# Patient Record
Sex: MALE | URBAN - METROPOLITAN AREA
[De-identification: names, ages, dates, MRNs, and addresses within clinical notes are randomized per-mention and may not be internally consistent; named-entity substitution may affect disease eponyms.]

---

## 2018-05-10 ENCOUNTER — EMERGENCY (EMERGENCY)
Facility: HOSPITAL | Age: 63
LOS: 1 days | Discharge: HOME | End: 2018-05-10
Attending: EMERGENCY MEDICINE
Payer: MEDICAID

## 2018-05-10 VITALS
SYSTOLIC BLOOD PRESSURE: 137 MMHG | DIASTOLIC BLOOD PRESSURE: 91 MMHG | TEMPERATURE: 99 F | HEART RATE: 103 BPM | RESPIRATION RATE: 18 BRPM | OXYGEN SATURATION: 96 %

## 2018-05-10 LAB
ANION GAP SERPL CALC-SCNC: 9 MMOL/L — SIGNIFICANT CHANGE UP (ref 5–17)
BASOPHILS # BLD AUTO: 0.1 K/UL — SIGNIFICANT CHANGE UP (ref 0–0.2)
BASOPHILS NFR BLD AUTO: 1.2 % — SIGNIFICANT CHANGE UP (ref 0–2)
BUN SERPL-MCNC: 29 MG/DL — HIGH (ref 7–18)
CALCIUM SERPL-MCNC: 9.3 MG/DL — SIGNIFICANT CHANGE UP (ref 8.4–10.5)
CHLORIDE SERPL-SCNC: 103 MMOL/L — SIGNIFICANT CHANGE UP (ref 96–108)
CO2 SERPL-SCNC: 26 MMOL/L — SIGNIFICANT CHANGE UP (ref 22–31)
CREAT SERPL-MCNC: 1.75 MG/DL — HIGH (ref 0.5–1.3)
EOSINOPHIL # BLD AUTO: 0.2 K/UL — SIGNIFICANT CHANGE UP (ref 0–0.5)
EOSINOPHIL NFR BLD AUTO: 3 % — SIGNIFICANT CHANGE UP (ref 0–6)
GLUCOSE SERPL-MCNC: 97 MG/DL — SIGNIFICANT CHANGE UP (ref 70–99)
HCT VFR BLD CALC: 44.3 % — SIGNIFICANT CHANGE UP (ref 39–50)
HGB BLD-MCNC: 14.9 G/DL — SIGNIFICANT CHANGE UP (ref 13–17)
LYMPHOCYTES # BLD AUTO: 1.8 K/UL — SIGNIFICANT CHANGE UP (ref 1–3.3)
LYMPHOCYTES # BLD AUTO: 30.2 % — SIGNIFICANT CHANGE UP (ref 13–44)
MCHC RBC-ENTMCNC: 31.7 PG — SIGNIFICANT CHANGE UP (ref 27–34)
MCHC RBC-ENTMCNC: 33.6 GM/DL — SIGNIFICANT CHANGE UP (ref 32–36)
MCV RBC AUTO: 94.2 FL — SIGNIFICANT CHANGE UP (ref 80–100)
MONOCYTES # BLD AUTO: 0.6 K/UL — SIGNIFICANT CHANGE UP (ref 0–0.9)
MONOCYTES NFR BLD AUTO: 9.6 % — SIGNIFICANT CHANGE UP (ref 2–14)
NEUTROPHILS # BLD AUTO: 3.4 K/UL — SIGNIFICANT CHANGE UP (ref 1.8–7.4)
NEUTROPHILS NFR BLD AUTO: 56 % — SIGNIFICANT CHANGE UP (ref 43–77)
PLATELET # BLD AUTO: 163 K/UL — SIGNIFICANT CHANGE UP (ref 150–400)
POTASSIUM SERPL-MCNC: 4.1 MMOL/L — SIGNIFICANT CHANGE UP (ref 3.5–5.3)
POTASSIUM SERPL-SCNC: 4.1 MMOL/L — SIGNIFICANT CHANGE UP (ref 3.5–5.3)
RBC # BLD: 4.7 M/UL — SIGNIFICANT CHANGE UP (ref 4.2–5.8)
RBC # FLD: 11.7 % — SIGNIFICANT CHANGE UP (ref 10.3–14.5)
SODIUM SERPL-SCNC: 138 MMOL/L — SIGNIFICANT CHANGE UP (ref 135–145)
TROPONIN I SERPL-MCNC: <0.015 NG/ML — SIGNIFICANT CHANGE UP (ref 0–0.04)
WBC # BLD: 6.1 K/UL — SIGNIFICANT CHANGE UP (ref 3.8–10.5)
WBC # FLD AUTO: 6.1 K/UL — SIGNIFICANT CHANGE UP (ref 3.8–10.5)

## 2018-05-10 PROCEDURE — 93010 ELECTROCARDIOGRAM REPORT: CPT

## 2018-05-10 PROCEDURE — 93005 ELECTROCARDIOGRAM TRACING: CPT

## 2018-05-10 PROCEDURE — 99284 EMERGENCY DEPT VISIT MOD MDM: CPT

## 2018-05-10 PROCEDURE — 80048 BASIC METABOLIC PNL TOTAL CA: CPT

## 2018-05-10 PROCEDURE — 70450 CT HEAD/BRAIN W/O DYE: CPT | Mod: 26

## 2018-05-10 PROCEDURE — 70450 CT HEAD/BRAIN W/O DYE: CPT

## 2018-05-10 PROCEDURE — 82962 GLUCOSE BLOOD TEST: CPT

## 2018-05-10 PROCEDURE — 99284 EMERGENCY DEPT VISIT MOD MDM: CPT | Mod: 25

## 2018-05-10 PROCEDURE — 96374 THER/PROPH/DIAG INJ IV PUSH: CPT

## 2018-05-10 PROCEDURE — 84484 ASSAY OF TROPONIN QUANT: CPT

## 2018-05-10 PROCEDURE — 96375 TX/PRO/DX INJ NEW DRUG ADDON: CPT

## 2018-05-10 PROCEDURE — 85027 COMPLETE CBC AUTOMATED: CPT

## 2018-05-10 RX ORDER — KETOROLAC TROMETHAMINE 30 MG/ML
30 SYRINGE (ML) INJECTION ONCE
Refills: 0 | Status: DISCONTINUED | OUTPATIENT
Start: 2018-05-10 | End: 2018-05-10

## 2018-05-10 RX ORDER — SODIUM CHLORIDE 9 MG/ML
1000 INJECTION INTRAMUSCULAR; INTRAVENOUS; SUBCUTANEOUS ONCE
Refills: 0 | Status: COMPLETED | OUTPATIENT
Start: 2018-05-10 | End: 2018-05-10

## 2018-05-10 RX ORDER — DIPHENHYDRAMINE HCL 50 MG
25 CAPSULE ORAL ONCE
Refills: 0 | Status: COMPLETED | OUTPATIENT
Start: 2018-05-10 | End: 2018-05-10

## 2018-05-10 RX ADMIN — Medication 25 MILLIGRAM(S): at 17:41

## 2018-05-10 RX ADMIN — Medication 30 MILLIGRAM(S): at 18:06

## 2018-05-10 RX ADMIN — Medication 30 MILLIGRAM(S): at 17:42

## 2018-05-10 RX ADMIN — SODIUM CHLORIDE 1000 MILLILITER(S): 9 INJECTION INTRAMUSCULAR; INTRAVENOUS; SUBCUTANEOUS at 17:42

## 2018-05-10 NOTE — ED ADULT NURSE NOTE - OBJECTIVE STATEMENT
Pt speaks [place], prefers his cousin to translate, c/o generalized weakness, pain to B/L LE, denies trauma  Denies, dizziness, chest pain or difficulty breathing  feels like this x 15 days

## 2018-05-10 NOTE — ED PROVIDER NOTE - OBJECTIVE STATEMENT
61 y/o w/ hx of HTN, CVA, anxiety disorder presents c/o weakness x 10 days. Feels weak in lower extremities bilaterally. Pt also reports not sleeping secondary to anxiety. Denies fever, HA, nausea, emesis, diarrhea, blurry vision, dizziness, and any other complaints. NKDA.

## 2018-05-10 NOTE — ED PROVIDER NOTE - PROGRESS NOTE DETAILS
pt reassessed, labs and imaging negative. Pt asking for medication for sleep.  Pt does appear anxious.  Pt says he does have a psychiatrist to follow up with, but that he has not been helping.  Admission offered for weakness as pt feels unsteady when walking however pt prefers to go home.  Will give outpatient referral.

## 2018-05-10 NOTE — ED PROVIDER NOTE - CONSTITUTIONAL, MLM
Generalized malaise, well nourished, awake, alert, oriented to person, place, time/situation and in no apparent distress. normal...

## 2020-01-31 ENCOUNTER — INPATIENT (INPATIENT)
Facility: HOSPITAL | Age: 65
LOS: 17 days | Discharge: NURSING, REHAB OR LONG-TERM CARE | DRG: 64 | End: 2020-02-18
Attending: PSYCHIATRY & NEUROLOGY | Admitting: PSYCHIATRY & NEUROLOGY
Payer: MEDICAID

## 2020-01-31 VITALS — SYSTOLIC BLOOD PRESSURE: 208 MMHG | RESPIRATION RATE: 14 BRPM | DIASTOLIC BLOOD PRESSURE: 123 MMHG | HEART RATE: 90 BPM

## 2020-01-31 DIAGNOSIS — R56.9 UNSPECIFIED CONVULSIONS: ICD-10-CM

## 2020-01-31 LAB
ALBUMIN SERPL ELPH-MCNC: 3.5 G/DL — SIGNIFICANT CHANGE UP (ref 3.3–5)
ALP SERPL-CCNC: 60 U/L — SIGNIFICANT CHANGE UP (ref 40–120)
ALT FLD-CCNC: 11 U/L — SIGNIFICANT CHANGE UP (ref 10–45)
ANION GAP SERPL CALC-SCNC: 12 MMOL/L — SIGNIFICANT CHANGE UP (ref 5–17)
AST SERPL-CCNC: 19 U/L — SIGNIFICANT CHANGE UP (ref 10–40)
BASOPHILS # BLD AUTO: 0.02 K/UL — SIGNIFICANT CHANGE UP (ref 0–0.2)
BASOPHILS NFR BLD AUTO: 0.3 % — SIGNIFICANT CHANGE UP (ref 0–2)
BILIRUB SERPL-MCNC: 0.7 MG/DL — SIGNIFICANT CHANGE UP (ref 0.2–1.2)
BUN SERPL-MCNC: 10 MG/DL — SIGNIFICANT CHANGE UP (ref 7–23)
CALCIUM SERPL-MCNC: 8.1 MG/DL — LOW (ref 8.4–10.5)
CHLORIDE SERPL-SCNC: 111 MMOL/L — HIGH (ref 96–108)
CO2 SERPL-SCNC: 19 MMOL/L — LOW (ref 22–31)
CREAT SERPL-MCNC: 1.21 MG/DL — SIGNIFICANT CHANGE UP (ref 0.5–1.3)
EOSINOPHIL # BLD AUTO: 0.09 K/UL — SIGNIFICANT CHANGE UP (ref 0–0.5)
EOSINOPHIL NFR BLD AUTO: 1.3 % — SIGNIFICANT CHANGE UP (ref 0–6)
GLUCOSE BLDC GLUCOMTR-MCNC: 100 MG/DL — HIGH (ref 70–99)
GLUCOSE SERPL-MCNC: 104 MG/DL — HIGH (ref 70–99)
HCT VFR BLD CALC: 35.8 % — LOW (ref 39–50)
HGB BLD-MCNC: 13 G/DL — SIGNIFICANT CHANGE UP (ref 13–17)
IMM GRANULOCYTES NFR BLD AUTO: 0.4 % — SIGNIFICANT CHANGE UP (ref 0–1.5)
LYMPHOCYTES # BLD AUTO: 0.57 K/UL — LOW (ref 1–3.3)
LYMPHOCYTES # BLD AUTO: 8.3 % — LOW (ref 13–44)
MCHC RBC-ENTMCNC: 35.5 PG — HIGH (ref 27–34)
MCHC RBC-ENTMCNC: 36.3 GM/DL — HIGH (ref 32–36)
MCV RBC AUTO: 97.8 FL — SIGNIFICANT CHANGE UP (ref 80–100)
MONOCYTES # BLD AUTO: 0.53 K/UL — SIGNIFICANT CHANGE UP (ref 0–0.9)
MONOCYTES NFR BLD AUTO: 7.7 % — SIGNIFICANT CHANGE UP (ref 2–14)
NEUTROPHILS # BLD AUTO: 5.64 K/UL — SIGNIFICANT CHANGE UP (ref 1.8–7.4)
NEUTROPHILS NFR BLD AUTO: 82 % — HIGH (ref 43–77)
NRBC # BLD: 0 /100 WBCS — SIGNIFICANT CHANGE UP (ref 0–0)
PLATELET # BLD AUTO: 144 K/UL — LOW (ref 150–400)
POTASSIUM SERPL-MCNC: 3.9 MMOL/L — SIGNIFICANT CHANGE UP (ref 3.5–5.3)
POTASSIUM SERPL-SCNC: 3.9 MMOL/L — SIGNIFICANT CHANGE UP (ref 3.5–5.3)
PROT SERPL-MCNC: 5.5 G/DL — LOW (ref 6–8.3)
RBC # BLD: 3.66 M/UL — LOW (ref 4.2–5.8)
RBC # FLD: 13.6 % — SIGNIFICANT CHANGE UP (ref 10.3–14.5)
SODIUM SERPL-SCNC: 142 MMOL/L — SIGNIFICANT CHANGE UP (ref 135–145)
WBC # BLD: 6.88 K/UL — SIGNIFICANT CHANGE UP (ref 3.8–10.5)
WBC # FLD AUTO: 6.88 K/UL — SIGNIFICANT CHANGE UP (ref 3.8–10.5)

## 2020-01-31 PROCEDURE — 61645 PERQ ART M-THROMBECT &/NFS: CPT | Mod: RT

## 2020-01-31 PROCEDURE — 70498 CT ANGIOGRAPHY NECK: CPT | Mod: 26

## 2020-01-31 PROCEDURE — 71045 X-RAY EXAM CHEST 1 VIEW: CPT | Mod: 26

## 2020-01-31 PROCEDURE — 99291 CRITICAL CARE FIRST HOUR: CPT

## 2020-01-31 PROCEDURE — 70450 CT HEAD/BRAIN W/O DYE: CPT | Mod: 26,59,77

## 2020-01-31 PROCEDURE — 0042T: CPT

## 2020-01-31 PROCEDURE — 70496 CT ANGIOGRAPHY HEAD: CPT | Mod: 26

## 2020-01-31 PROCEDURE — 70450 CT HEAD/BRAIN W/O DYE: CPT | Mod: 26,59

## 2020-01-31 PROCEDURE — 99223 1ST HOSP IP/OBS HIGH 75: CPT

## 2020-01-31 RX ORDER — LABETALOL HCL 100 MG
10 TABLET ORAL ONCE
Refills: 0 | Status: COMPLETED | OUTPATIENT
Start: 2020-01-31 | End: 2020-01-31

## 2020-01-31 RX ORDER — ASPIRIN/CALCIUM CARB/MAGNESIUM 324 MG
81 TABLET ORAL DAILY
Refills: 0 | Status: DISCONTINUED | OUTPATIENT
Start: 2020-01-31 | End: 2020-02-03

## 2020-01-31 RX ORDER — PROPOFOL 10 MG/ML
4 INJECTION, EMULSION INTRAVENOUS
Qty: 1000 | Refills: 0 | Status: DISCONTINUED | OUTPATIENT
Start: 2020-01-31 | End: 2020-02-01

## 2020-01-31 RX ORDER — CLOPIDOGREL BISULFATE 75 MG/1
75 TABLET, FILM COATED ORAL DAILY
Refills: 0 | Status: DISCONTINUED | OUTPATIENT
Start: 2020-01-31 | End: 2020-02-10

## 2020-01-31 RX ORDER — LEVETIRACETAM 250 MG/1
500 TABLET, FILM COATED ORAL EVERY 12 HOURS
Refills: 0 | Status: DISCONTINUED | OUTPATIENT
Start: 2020-01-31 | End: 2020-02-05

## 2020-01-31 RX ORDER — LEVETIRACETAM 250 MG/1
1000 TABLET, FILM COATED ORAL ONCE
Refills: 0 | Status: COMPLETED | OUTPATIENT
Start: 2020-01-31 | End: 2020-01-31

## 2020-01-31 RX ORDER — NOREPINEPHRINE BITARTRATE/D5W 8 MG/250ML
0.07 PLASTIC BAG, INJECTION (ML) INTRAVENOUS
Qty: 8 | Refills: 0 | Status: DISCONTINUED | OUTPATIENT
Start: 2020-01-31 | End: 2020-02-01

## 2020-01-31 RX ORDER — SODIUM CHLORIDE 9 MG/ML
1000 INJECTION INTRAMUSCULAR; INTRAVENOUS; SUBCUTANEOUS
Refills: 0 | Status: DISCONTINUED | OUTPATIENT
Start: 2020-01-31 | End: 2020-02-03

## 2020-01-31 RX ORDER — ATORVASTATIN CALCIUM 80 MG/1
80 TABLET, FILM COATED ORAL AT BEDTIME
Refills: 0 | Status: DISCONTINUED | OUTPATIENT
Start: 2020-01-31 | End: 2020-02-05

## 2020-01-31 RX ORDER — CHLORHEXIDINE GLUCONATE 213 G/1000ML
1 SOLUTION TOPICAL
Refills: 0 | Status: DISCONTINUED | OUTPATIENT
Start: 2020-01-31 | End: 2020-02-07

## 2020-01-31 RX ORDER — DEXMEDETOMIDINE HYDROCHLORIDE IN 0.9% SODIUM CHLORIDE 4 UG/ML
0.19 INJECTION INTRAVENOUS
Qty: 200 | Refills: 0 | Status: DISCONTINUED | OUTPATIENT
Start: 2020-01-31 | End: 2020-02-03

## 2020-01-31 RX ORDER — EPINEPHRINE 0.3 MG/.3ML
4 INJECTION INTRAMUSCULAR; SUBCUTANEOUS
Qty: 4 | Refills: 0 | Status: DISCONTINUED | OUTPATIENT
Start: 2020-01-31 | End: 2020-01-31

## 2020-01-31 RX ORDER — SODIUM CHLORIDE 9 MG/ML
1000 INJECTION INTRAMUSCULAR; INTRAVENOUS; SUBCUTANEOUS ONCE
Refills: 0 | Status: COMPLETED | OUTPATIENT
Start: 2020-01-31 | End: 2020-01-31

## 2020-01-31 RX ADMIN — DEXMEDETOMIDINE HYDROCHLORIDE IN 0.9% SODIUM CHLORIDE 4 MICROGRAM(S)/KG/HR: 4 INJECTION INTRAVENOUS at 17:25

## 2020-01-31 RX ADMIN — LEVETIRACETAM 400 MILLIGRAM(S): 250 TABLET, FILM COATED ORAL at 19:57

## 2020-01-31 RX ADMIN — Medication 10.19 MICROGRAM(S)/KG/MIN: at 17:45

## 2020-01-31 RX ADMIN — Medication 2 MILLIGRAM(S): at 21:27

## 2020-01-31 RX ADMIN — PROPOFOL 1.98 MICROGRAM(S)/KG/MIN: 10 INJECTION, EMULSION INTRAVENOUS at 21:27

## 2020-01-31 RX ADMIN — Medication 10 MILLIGRAM(S): at 14:15

## 2020-01-31 RX ADMIN — Medication 10 MILLIGRAM(S): at 14:10

## 2020-01-31 RX ADMIN — SODIUM CHLORIDE 2000 MILLILITER(S): 9 INJECTION INTRAMUSCULAR; INTRAVENOUS; SUBCUTANEOUS at 18:35

## 2020-01-31 RX ADMIN — SODIUM CHLORIDE 75 MILLILITER(S): 9 INJECTION INTRAMUSCULAR; INTRAVENOUS; SUBCUTANEOUS at 19:30

## 2020-01-31 RX ADMIN — Medication 2 MILLIGRAM(S): at 22:35

## 2020-01-31 NOTE — CONSULT NOTE ADULT - ASSESSMENT
ASSESSMENT/PLAN: Patient with new R MCA stroke, is now outside of tPA window, still within window for thrombectomy  Discussed with neurosurgery, patient will go for R MCA thrombectomy    Localization: R MCA  Last known normal: 830am 1/31/20  tPA: Not Given  Continue close monitoring for neurologic deterioration  Permissive HTN: Goal - sBP 140-180  Titrate statin to LDL goal less than 70  Antiplatelet: Hold for now  MR Head: Pending  Physical therapy/OT/Speech eval/treatment.   LOC: Pending

## 2020-01-31 NOTE — BRIEF OPERATIVE NOTE - NSICDXBRIEFPROCEDURE_GEN_ALL_CORE_FT
PROCEDURES:  Angiogram, carotid and cerebral, right 31-Jan-2020 15:41:15 Attempted mechanical thrombectomy of right MCA thrombus Marvel Hilton

## 2020-01-31 NOTE — H&P ADULT - NSHPLABSRESULTS_GEN_ALL_CORE
.  LABS:                         RADIOLOGY, EKG & ADDITIONAL TESTS: Reviewed. .  LABS:                         13.0   6.88  )-----------( 144      ( 31 Jan 2020 19:35 )             35.8     01-31    142  |  111<H>  |  10  ----------------------------<  104<H>  3.9   |  19<L>  |  1.21    Ca    8.1<L>      31 Jan 2020 19:34    TPro  5.5<L>  /  Alb  3.5  /  TBili  0.7  /  DBili  x   /  AST  19  /  ALT  11  /  AlkPhos  60  01-31                  RADIOLOGY, EKG & ADDITIONAL TESTS: Reviewed.

## 2020-01-31 NOTE — H&P ADULT - HISTORY OF PRESENT ILLNESS
65 y/o male transferred from [facility] with co History obtained from [place] medical record.     65 y/o male transferred from [facility] with concern for stroke (MCA). Patient presented to [facility] after episode of falling on floor and speech slurring in pharmacy, found to have left sided facial drooping of eyelids and slurred speech on presentation to [place]. Per reports, patient AAOx3 and able to follow commands.   Patient outside of window for tPA, transferred to [facility] for possible thrombectomy. At [facility], stroke code called on admission - imaging negative for acute infarct. Thrombectomy of R MCA attempted, however aborted as noted to have stump occlusion of mid right M1 with extensive JOSEPHINE collaterals - likely chronic occulusions. History obtained from [place] medical record.     65 y/o male transferred from [facility] with concern for stroke (MCA). Patient presented to [facility] after episode of falling on floor and speech slurring in pharmacy, found to have left sided facial drooping of eyelids and slurred speech on presentation to [place]. Per reports, patient AAOx3 and able to follow commands.   Patient outside of window for tPA, transferred to [facility] for possible thrombectomy. At [facility], stroke code called on admission - imaging negative for acute infarct. Thrombectomy of R MCA attempted, however aborted as noted to have stump occlusion of mid right M1 with extensive JOSEPHINE collaterals - likely chronic occulusions. Patient extubated and upon presentation to MICU, patient noted to be altered with tongue rolling back, with contraction of left upper and lower extremity - patient intubated for airway protection. Second stroke code called, with repeat imaging without evidence of acute infarct. History obtained from [place] medical record.     63 y/o male transferred from [facility] with concern for stroke (MCA). Patient presented to [facility] after episode of falling on floor and speech slurring in pharmacy, found to have left sided facial drooping of eyelids and slurred speech on presentation to [place]. Per reports, patient AAOx3 and able to follow commands.   Patient outside of window for tPA, transferred to [facility] for possible thrombectomy. At [facility], stroke code called on admission - imaging negative for acute infarct. Thrombectomy of R MCA attempted, however aborted as noted to have stump occlusion of mid right M1 with extensive JOSEPHINE collaterals - likely chronic occulusions. Patient extubated and upon presentation to MICU, patient noted to be altered with tongue rolling back, with contraction of left upper and lower extremity - patient intubated for airway protection. Second stroke code called, with repeat imaging without evidence of acute infarct.      [place]: Vitals:  Labs:  Interventions:    [facility]: Vitals  Labs:  [place]: History obtained from [place] medical record.     63 y/o male with PMH hypertension CVA (2 months ago at [facility]), dementia and depression transferred from [facility] with concern for stroke (MCA). Patient presented to [facility] after presenting to pharmacy with shirt and shoes on backwards and episode of falling on floor and speech slurring in pharmacy, found to have left sided facial drooping of eyelids, slurred speech, and expressive aphasia on presentation to [place]. Last known normal unknown. Baseline mental status AAOx3 able to follow commands, completes ADLs independently.     Patient outside of window for tPA, transferred to [facility] for possible thrombectomy. At [facility], stroke code called on admission - imaging negative for acute infarct. Thrombectomy of R MCA attempted, however aborted as noted to have stump occlusion of mid right M1 with extensive JOSEPHINE collaterals - likely chronic occulusion. Patient extubated and upon presentation to MICU, patient noted to be altered with tongue rolling back, with contraction of left upper and lower extremity - patient intubated for airway protection. Second stroke code called, with repeat imaging without evidence of acute infarct.      [place]: Vitals: T: Afebrile | HR: 91-93 | BP: 147-159/ (MAP: ) | RR: 20-22 |   Labs: BMP: WNL | UA: WNL | BAL <10 | Lipid panel: ; Cholesterol 176 |   Interventions: CTH: No acute intracranial abnormality; chronic microvascular ischemic and volume changes - old posterior right frontal cortical infarct noted.   CT Perfusion Brain w/ contrast: Right MCA abnormal flow pattern consistent with ischemic change; no discrete evidence of infarct.     [facility]: Vitals: T:   Labs:  Interventions History obtained from [place] medical record.     63 y/o male with PMH hypertension CVA (2 months ago at [facility]), dementia and depression transferred from [facility] with concern for stroke (MCA). Patient presented to [facility] after presenting to pharmacy with shirt and shoes on backwards and episode of falling on floor and speech slurring in pharmacy, found to have left sided facial drooping of eyelids, slurred speech, and expressive aphasia on presentation to [place]. Last known normal unknown. Baseline mental status AAOx3 able to follow commands, completes ADLs independently.     Patient outside of window for tPA, transferred to [facility] for possible thrombectomy. At [facility], stroke code called on admission - imaging negative for acute infarct. Thrombectomy of R MCA attempted, however aborted as noted to have stump occlusion of mid right M1 with extensive JOSEPHINE collaterals - likely chronic occulusion. Patient extubated and upon presentation to MICU, patient noted to be altered with tongue rolling back, with contraction of left upper and lower extremity - patient intubated for airway protection. Second stroke code called, with repeat imaging without evidence of acute infarct. Patients admitted to MICU, intubated pending vEEG.      [place]: Vitals: T: Afebrile | HR: 91-93 | BP: 147-159/ (MAP: ) | RR: 20-22 |   Labs: BMP: WNL | UA: WNL | BAL <10 | Lipid panel: ; Cholesterol 176 |   Interventions: CTH: No acute intracranial abnormality; chronic microvascular ischemic and volume changes - old posterior right frontal cortical infarct noted.   CT Perfusion Brain w/ contrast: Right MCA abnormal flow pattern consistent with ischemic change; no discrete evidence of infarct.     [facility]: Vitals: T: Afebrile | HR: 90 | BP: 208/123 | RR: 14  Labs:   CT Brain Stroke (on arrival): Gyriform sites of cortical hyperdensity, presumed contrast enhancement, are favored to be subacute sites of infarction, superimposed on more chronic sites of right MCA infarction.  CT Brain Stroke (2nd stroke code, following aborted thrombectomy): Compared to prior study from earlier in the day, no significant change in scattered enhancement of the right frontal and posterior temporal lobes, likely secondary to subacute infarction  CT Perfusion: Abnormal perfusion with RAPID calculated mismatch volume of 44 ml and mismatch ratio is infinite within the right MCA territory.  CT Angio Head: Probable high-grade stenosis of the right M1 segment rather than occlusion. Multifocal areas of narrowing involving the right A1 and left M1 segment which may be secondary to intracranial atherosclerosis.  CT Angio Neck: Normal CTA neck.     Interventions: Intubated and sedated s/p thrombectomy, vEEG placed History obtained from [place] medical record, as patient altered and subsequently intubated.     63 y/o male with PMH hypertension CVA (2 months ago at [facility]), dementia and depression transferred from [facility] with concern for stroke (MCA). Patient presented to [facility] after presenting to pharmacy with shirt and shoes on backwards and episode of falling on floor and speech slurring in pharmacy, found to have left sided facial drooping of eyelids, slurred speech, and expressive aphasia on presentation to [place]. Last known normal unknown. Baseline mental status AAOx3 able to follow commands, completes ADLs independently.     Patient outside of window for tPA, transferred to [facility] for possible thrombectomy. At [facility], stroke code called on admission - imaging negative for acute infarct. Thrombectomy of R MCA attempted, however aborted as noted to have stump occlusion of mid right M1 with extensive JOSEPHINE collaterals - likely chronic occulusion. Patient extubated and upon presentation to MICU, patient noted to be altered with tongue rolling back, with contraction of left upper and lower extremity - patient intubated for airway protection. Second stroke code called, with repeat imaging without evidence of acute infarct. Patients admitted to MICU, intubated pending vEEG.      [place]: Vitals: T: Afebrile | HR: 91-93 | BP: 147-159/ (MAP: ) | RR: 20-22 |   Labs: BMP: WNL | UA: WNL | BAL <10 | Lipid panel: ; Cholesterol 176 |   Interventions: CTH: No acute intracranial abnormality; chronic microvascular ischemic and volume changes - old posterior right frontal cortical infarct noted.   CT Perfusion Brain w/ contrast: Right MCA abnormal flow pattern consistent with ischemic change; no discrete evidence of infarct.     [facility]: Vitals: T: Afebrile | HR: 90 | BP: 208/123 | RR: 14  Labs: WBC 6.88; Hgb 13; Plt 144; Cr 1.21; Glucose 104  CT Brain Stroke (on arrival): Gyriform sites of cortical hyperdensity, presumed contrast enhancement, are favored to be subacute sites of infarction, superimposed on more chronic sites of right MCA infarction.  CT Brain Stroke (2nd stroke code, following aborted thrombectomy): Compared to prior study from earlier in the day, no significant change in scattered enhancement of the right frontal and posterior temporal lobes, likely secondary to subacute infarction  CT Perfusion: Abnormal perfusion with RAPID calculated mismatch volume of 44 ml and mismatch ratio is infinite within the right MCA territory.  CT Angio Head: Probable high-grade stenosis of the right M1 segment rather than occlusion. Multifocal areas of narrowing involving the right A1 and left M1 segment which may be secondary to intracranial atherosclerosis.  CT Angio Neck: Normal CTA neck.     Interventions: Intubated and sedated s/p thrombectomy, vEEG placed

## 2020-01-31 NOTE — H&P ADULT - NSHPPHYSICALEXAM_GEN_ALL_CORE
.  VITAL SIGNS:  T(C): 36.9 (01-31-20 @ 16:40), Max: 36.9 (01-31-20 @ 16:40)  T(F): 98.5 (01-31-20 @ 16:40), Max: 98.5 (01-31-20 @ 16:40)  HR: 90 (01-31-20 @ 18:33) (68 - 121)  BP: 137/65 (01-31-20 @ 16:50) (137/65 - 211/129)  BP(mean): 93 (01-31-20 @ 16:50) (93 - 98)  RR: 19 (01-31-20 @ 17:50) (14 - 42)  SpO2: 100% (01-31-20 @ 18:33) (96% - 100%)  Wt(kg): --    PHYSICAL EXAM:    Constitutional: WDWN resting comfortably in bed; NAD  Head: NC/AT  Eyes: PERRL, EOMI, clear conjunctiva  ENT: no nasal discharge; uvula midline, no oropharyngeal erythema or exudates; MMM  Neck: supple; no JVD or thyromegaly  Respiratory: CTA B/L; no W/R/R, no retractions  Cardiac: +S1/S2; RRR; no M/R/G; PMI non-displaced  Gastrointestinal: soft, NT/ND; no rebound or guarding; +BSx4  Genitourinary: normal external genitalia  Back: spine midline, no bony tenderness or step-offs; no CVAT B/L  Extremities: WWP, no clubbing or cyanosis; no peripheral edema  Musculoskeletal: NROM x4; no joint swelling, tenderness or erythema  Vascular: 2+ radial, femoral, DP/PT pulses B/L  Dermatologic: skin warm, dry and intact; no rashes, wounds, or scars  Lymphatic: no submandibular or cervical LAD  Neurologic: AAOx3; CNII-XII grossly intact; no focal deficits  Psychiatric: affect and characteristics of appearance, verbalizations, behaviors are appropriate .  VITAL SIGNS:  T(C): 36.9 (01-31-20 @ 16:40), Max: 36.9 (01-31-20 @ 16:40)  T(F): 98.5 (01-31-20 @ 16:40), Max: 98.5 (01-31-20 @ 16:40)  HR: 90 (01-31-20 @ 18:33) (68 - 121)  BP: 137/65 (01-31-20 @ 16:50) (137/65 - 211/129)  BP(mean): 93 (01-31-20 @ 16:50) (93 - 98)  RR: 19 (01-31-20 @ 17:50) (14 - 42)  SpO2: 100% (01-31-20 @ 18:33) (96% - 100%)  Wt(kg): --    PHYSICAL EXAM:    Constitutional:  male lying in bed, snoring, somnolent  Head: NC/AT; left facial droop   Eyes: PERRL, EOMI, clear conjunctiva  ENT: no nasal discharge; uvula midline, no oropharyngeal erythema or exudates; MMM  Neck: supple; no JVD or thyromegaly  Respiratory: CTA B/L; no W/R/R, no retractions  Cardiac: +S1/S2; RRR; no M/R/G; PMI non-displaced  Gastrointestinal: soft, NT/ND; no rebound or guarding; +BSx4  Genitourinary: normal external genitalia  Extremities: WWP, no clubbing or cyanosis; no peripheral edema  Musculoskeletal: Right side strength within normal limits, left side arm in constant flexed position   Vascular: 2+ radial, femoral, DP/PT pulses B/L  Dermatologic: skin warm, dry and intact; no rashes, wounds, or scars  Lymphatic: no submandibular or cervical LAD  Neurologic: unable to assess orientation, patient somnolent

## 2020-01-31 NOTE — CONSULT NOTE ADULT - SUBJECTIVE AND OBJECTIVE BOX
NEURO CONSULT NOTE  **INCOMPLETE**    Patient is a 65 y/o male with PMHx of    last known well at 830am. At [place] patient was noted to have dysarthria, altered mental status, BP on arrival to [place] was _____. Patient was found to have acute R MCA stroke with perfusion defect on CT scan at [place], transferred to [facility] for further management. On arrival to [facility] stroke code called with NIHSS of ____, positive for dysarthria and only answered one question correctly, however following all commands, moving all extremities spontaneously, and non aphasic. BP at this time was 215/106, FSG was 100. CTH/CTA/CTP on arrival showed R sided perfusion defect and is consistent with subacute R MCA infarct. NEURO CONSULT NOTE  **INCOMPLETE**    Patient is a 63 y/o male with PMHx of    last known well at 830am. At [place] patient was noted to have dysarthria, altered mental status, BP on arrival to [place] was _____. Patient was found to have acute R MCA stroke with perfusion defect on CT scan at [place], transferred to [facility] for further management. On arrival to [facility] stroke code called with NIHSS of ____, positive for dysarthria and only answered one question correctly, however following all commands, moving all extremities spontaneously, and non aphasic. BP at this time was 215/106, FSG was 100. CTH/CTA/CTP on arrival showed R sided perfusion defect and is consistent with subacute R MCA infarct.     SUBJECTIVE / INTERVAL HPI: Patient seen and examined at bedside.     VITAL SIGNS:  Vital Signs Last 24 Hrs  T(C): --  T(F): --  HR: 90 (31 Jan 2020 14:03) (90 - 90)  BP: 208/123 (31 Jan 2020 14:03) (208/123 - 208/123)  BP(mean): --  RR: 14 (31 Jan 2020 14:03) (14 - 14)  SpO2: --    REVIEW OF SYSTEMS:    CONSTITUTIONAL: No weakness, fevers or chills.   EYES/ENT: No visual changes;  No vertigo or throat pain   NECK: No pain or stiffness  RESPIRATORY: No cough, wheezing, hemoptysis; No shortness of breath  CARDIOVASCULAR: No chest pain or palpitations  GASTROINTESTINAL: No abdominal or epigastric pain. No nausea, vomiting, or hematemesis; No diarrhea or constipation. No melena or hematochezia.  GENITOURINARY: No dysuria, frequency or hematuria  NEUROLOGICAL: No numbness or weakness  SKIN: No itching, burning, rashes, or lesions   All other review of systems is negative unless indicated above.    PHYSICAL EXAM:  General: Well developed well nourished appearing male lying in bed, slightly confused, following commands and responding appropriately.   Neurologic:  - Mental Status:  AAOx2 (not to date);  - Verbal:  Speech is dysarthric but understandable. Patient is able to identify three items correctly. Negative for expressive or receptive aphasia.   - Cranial Nerves II-XII:    II:  PERRLA; visual fields are full to confrontation  III, IV, VI:  EOMI, no nystagmus  V:  facial sensation is intact in the V1-V3 distribution bilaterally.  VII:  face is symmetric  VIII:  hearing is intact to finger rub  IX, X:  uvula is midline and soft palate rises symmetrically  XI:  head turning and shoulder shrug are intact bilaterally  XII:  tongue protrudes in the midline  - Motor:  Patient initially only moving LUE and LLE spontaneously, however when stimulated patient is able to move all extremities with 5/5 strength. Patient appears to respond to stimuli on both sides with negative extinction. Pronator drift is negative bilaterally for upper and lower extremities.   - Reflexes:  2+ and symmetric at the biceps and patellars  - Sensory:  intact to light touch on bilateral upper and lower extremities.  - Coordination:  no dysmetria  - Gait:  Deferred    MEDICATIONS:  MEDICATIONS  (STANDING):    MEDICATIONS  (PRN):      ALLERGIES:  Allergies    Allergy Status Unknown    Intolerances        LABS:              CAPILLARY BLOOD GLUCOSE      POCT Blood Glucose.: 100 mg/dL (31 Jan 2020 14:09)      RADIOLOGY & ADDITIONAL TESTS: Reviewed. NEURO CONSULT NOTE  **INCOMPLETE**    Patient is a 63 y/o male with PMHx of    last known well at 830am. At [place] patient was noted to have dysarthria, altered mental status, BP on arrival to [place] was _____. Patient was found to have acute R MCA stroke with perfusion defect on CT scan at [place], transferred to [facility] for further management. On arrival to [facility] stroke code called with NIHSS of 5, positive for dysarthria, facial droop, and slight L pronator drift. Patient only answered one question correctly, however following all commands, moving all extremities spontaneously, and non aphasic. BP at this time was 215/106, FSG was 100. CTH/CTA/CTP on arrival showed R sided perfusion defect and is consistent with subacute R MCA infarct.     SUBJECTIVE / INTERVAL HPI: Patient seen and examined at bedside.     VITAL SIGNS:  Vital Signs Last 24 Hrs  T(C): --  T(F): --  HR: 90 (31 Jan 2020 14:03) (90 - 90)  BP: 208/123 (31 Jan 2020 14:03) (208/123 - 208/123)  BP(mean): --  RR: 14 (31 Jan 2020 14:03) (14 - 14)  SpO2: --    REVIEW OF SYSTEMS:    CONSTITUTIONAL: No weakness, fevers or chills.   EYES/ENT: No visual changes;  No vertigo or throat pain   NECK: No pain or stiffness  RESPIRATORY: No cough, wheezing, hemoptysis; No shortness of breath  CARDIOVASCULAR: No chest pain or palpitations  GASTROINTESTINAL: No abdominal or epigastric pain. No nausea, vomiting, or hematemesis; No diarrhea or constipation. No melena or hematochezia.  GENITOURINARY: No dysuria, frequency or hematuria  NEUROLOGICAL: No numbness or weakness  SKIN: No itching, burning, rashes, or lesions   All other review of systems is negative unless indicated above.    PHYSICAL EXAM:  General: Well developed well nourished appearing male lying in bed, slightly confused, following commands and responding appropriately.   Neurologic:  - Mental Status:  AAOx2 (not to date);  - Verbal:  Speech is dysarthric but understandable. Patient is able to identify three items correctly. Negative for expressive or receptive aphasia.   - Cranial Nerves II-XII:    II:  PERRLA; visual fields are full to confrontation  III, IV, VI:  EOMI, no nystagmus  V:  facial sensation is intact in the V1-V3 distribution bilaterally.  VII: Slight L sided facial droop.   VIII:  hearing is intact to finger rub  IX, X:  uvula is midline and soft palate rises symmetrically  XI:  head turning and shoulder shrug are intact bilaterally  XII:  tongue protrudes in the midline  - Motor:  Patient initially only moving LUE and LLE spontaneously, however when stimulated patient is able to move all extremities with 5/5 strength. Patient appears to respond to stimuli on both sides with negative extinction. Pronator drift is slightly positive for LUE. Negative bilaterally for lower extremities.   - Reflexes:  2+ and symmetric at the biceps and patellars  - Sensory:  intact to light touch on bilateral upper and lower extremities.  - Coordination:  no dysmetria  - Gait:  Deferred    MEDICATIONS:  MEDICATIONS  (STANDING):    MEDICATIONS  (PRN):      ALLERGIES:  Allergies    Allergy Status Unknown    Intolerances        LABS:              CAPILLARY BLOOD GLUCOSE      POCT Blood Glucose.: 100 mg/dL (31 Jan 2020 14:09)      RADIOLOGY & ADDITIONAL TESTS: Reviewed. NEURO CONSULT NOTE  **INCOMPLETE**    Patient is a 63 y/o male with PMHx of ______    last known well at 830am. At [place] patient was noted to have dysarthria, altered mental status, BP on arrival to [place] was _____. Patient was found to have acute R MCA stroke with perfusion defect on CT scan at [place], transferred to [facility] for further management. On arrival to [facility] stroke code called with NIHSS of 5, positive for dysarthria, facial droop, and slight L pronator drift. Patient only answered one question correctly, however following all commands, moving all extremities spontaneously, and non aphasic. BP at this time was 215/106, FSG was 100. CTH/CTA/CTP on arrival showed R sided perfusion defect and is consistent with subacute R MCA infarct.     SUBJECTIVE / INTERVAL HPI: Patient seen and examined at bedside.     VITAL SIGNS:  Vital Signs Last 24 Hrs  T(C): --  T(F): --  HR: 90 (31 Jan 2020 14:03) (90 - 90)  BP: 208/123 (31 Jan 2020 14:03) (208/123 - 208/123)  BP(mean): --  RR: 14 (31 Jan 2020 14:03) (14 - 14)  SpO2: --    REVIEW OF SYSTEMS:    CONSTITUTIONAL: No weakness, fevers or chills.   EYES/ENT: No visual changes;  No vertigo or throat pain   NECK: No pain or stiffness  RESPIRATORY: No cough, wheezing, hemoptysis; No shortness of breath  CARDIOVASCULAR: No chest pain or palpitations  GASTROINTESTINAL: No abdominal or epigastric pain. No nausea, vomiting, or hematemesis; No diarrhea or constipation. No melena or hematochezia.  GENITOURINARY: No dysuria, frequency or hematuria  NEUROLOGICAL: No numbness or weakness  SKIN: No itching, burning, rashes, or lesions   All other review of systems is negative unless indicated above.    PHYSICAL EXAM:  General: Well developed well nourished appearing male lying in bed, slightly confused, following commands and responding appropriately.   Neurologic:  - Mental Status:  AAOx2 (not to date);  - Verbal:  Speech is dysarthric but understandable. Patient is able to identify three items correctly. Negative for expressive or receptive aphasia.   - Cranial Nerves II-XII:    II:  PERRLA; visual fields are full to confrontation  III, IV, VI:  EOMI, no nystagmus  V:  facial sensation is intact in the V1-V3 distribution bilaterally.  VII: Slight L sided facial droop.   VIII:  hearing is intact to finger rub  IX, X:  uvula is midline and soft palate rises symmetrically  XI:  head turning and shoulder shrug are intact bilaterally  XII:  tongue protrudes in the midline  - Motor:  Patient initially only moving LUE and LLE spontaneously, however when stimulated patient is able to move all extremities with 5/5 strength. Patient appears to respond to stimuli on both sides with negative extinction. Pronator drift is slightly positive for LUE. Negative bilaterally for lower extremities.   - Reflexes:  2+ and symmetric at the biceps and patellars  - Sensory:  intact to light touch on bilateral upper and lower extremities.  - Coordination:  no dysmetria  - Gait:  Deferred    MEDICATIONS:  MEDICATIONS  (STANDING):    MEDICATIONS  (PRN):      ALLERGIES:  Allergies    Allergy Status Unknown    Intolerances        LABS:              CAPILLARY BLOOD GLUCOSE      POCT Blood Glucose.: 100 mg/dL (31 Jan 2020 14:09)      RADIOLOGY & ADDITIONAL TESTS: Reviewed. NEURO CONSULT NOTE  **INCOMPLETE**    Patient is a 65 y/o male with PMHx of HTN, CVA, anxiety disorder who presented to [place] this morning with stroke-like symptoms. Patient was last known well at 830am. On arrival to [place] patient was noted to have dysarthria, altered mental status, BP on arrival to [place] was _____. Patient was found to have acute R MCA stroke with perfusion defect on CT scan at [place], transferred to [facility] for further management. On arrival to [facility] stroke code called with NIHSS of 5, positive for dysarthria, facial droop, and slight L pronator drift. Patient only answered one question correctly, however following all commands, moving all extremities spontaneously, and non aphasic. BP at this time was 215/106, FSG was 100, patient received labetalol IV 10mg x2. CTH/CTA/CTP on arrival showed R sided perfusion defect and is consistent with subacute R MCA infarct. At present patient is denying any headache, dizziness, lightheadedness, vision changes, hearing changes, nausea, vomiting, fever, chills, or any other complaints.     VITAL SIGNS:  Vital Signs Last 24 Hrs  T(C): --  T(F): --  HR: 90 (31 Jan 2020 14:03) (90 - 90)  BP: 208/123 (31 Jan 2020 14:03) (208/123 - 208/123)  BP(mean): --  RR: 14 (31 Jan 2020 14:03) (14 - 14)  SpO2: --    REVIEW OF SYSTEMS:    CONSTITUTIONAL: No weakness, fevers or chills.   EYES/ENT: No visual changes;  No vertigo or throat pain   NECK: No pain or stiffness  RESPIRATORY: No cough, wheezing, hemoptysis; No shortness of breath  CARDIOVASCULAR: No chest pain or palpitations  GASTROINTESTINAL: No abdominal or epigastric pain. No nausea, vomiting, or hematemesis; No diarrhea or constipation. No melena or hematochezia.  GENITOURINARY: No dysuria, frequency or hematuria  NEUROLOGICAL: No numbness or weakness  SKIN: No itching, burning, rashes, or lesions   All other review of systems is negative unless indicated above.    PHYSICAL EXAM:  General: Well developed well nourished appearing male lying in bed, slightly confused, following commands and responding appropriately.   Neurologic:  - Mental Status:  AAOx2 (not to date);  - Verbal:  Speech is dysarthric but understandable. Patient is able to identify three items correctly. Negative for expressive or receptive aphasia.   - Cranial Nerves II-XII:    II:  PERRLA; visual fields are full to confrontation  III, IV, VI:  EOMI, no nystagmus  V:  facial sensation is intact in the V1-V3 distribution bilaterally.  VII: Slight L sided facial droop.   VIII:  hearing is intact to finger rub  IX, X:  uvula is midline and soft palate rises symmetrically  XI:  head turning and shoulder shrug are intact bilaterally  XII:  tongue protrudes in the midline  - Motor:  Patient initially only moving LUE and LLE spontaneously, however when stimulated patient is able to move all extremities with 5/5 strength. Patient appears to respond to stimuli on both sides with negative extinction. Pronator drift is slightly positive for LUE. Negative bilaterally for lower extremities.   - Reflexes:  2+ and symmetric at the biceps and patellars  - Sensory:  intact to light touch on bilateral upper and lower extremities.  - Coordination:  no dysmetria  - Gait:  Deferred    MEDICATIONS:  MEDICATIONS  (STANDING):    MEDICATIONS  (PRN):      ALLERGIES:  Allergies    Allergy Status Unknown    Intolerances        LABS:              CAPILLARY BLOOD GLUCOSE      POCT Blood Glucose.: 100 mg/dL (31 Jan 2020 14:09)      RADIOLOGY & ADDITIONAL TESTS: Reviewed. NEURO CONSULT NOTE  **INCOMPLETE**    Patient is a 65 y/o male with PMHx of HTN, CVA, anxiety disorder who presented to [place] this morning with stroke-like symptoms. Patient was last known well at 830am. On arrival to [place] patient was noted to have dysarthria, altered mental status. Patient was found to have acute R MCA stroke with perfusion defect on CT scan at [place], transferred to [facility] for further management. On arrival to [facility] stroke code called with NIHSS of 5, positive for dysarthria, facial droop, and slight L pronator drift. Patient only answered one question correctly, however following all commands, moving all extremities spontaneously, and non aphasic. BP at this time was 215/106, FSG was 100, patient received labetalol IV 10mg x2. CTH/CTA/CTP on arrival showed R sided perfusion defect and is consistent with subacute R MCA infarct. At present patient is denying any headache, dizziness, lightheadedness, vision changes, hearing changes, nausea, vomiting, fever, chills, or any other complaints.     VITAL SIGNS:  Vital Signs Last 24 Hrs  T(C): --  T(F): --  HR: 90 (31 Jan 2020 14:03) (90 - 90)  BP: 208/123 (31 Jan 2020 14:03) (208/123 - 208/123)  BP(mean): --  RR: 14 (31 Jan 2020 14:03) (14 - 14)  SpO2: --    REVIEW OF SYSTEMS:    CONSTITUTIONAL: No weakness, fevers or chills.   EYES/ENT: No visual changes;  No vertigo or throat pain   NECK: No pain or stiffness  RESPIRATORY: No cough, wheezing, hemoptysis; No shortness of breath  CARDIOVASCULAR: No chest pain or palpitations  GASTROINTESTINAL: No abdominal or epigastric pain. No nausea, vomiting, or hematemesis; No diarrhea or constipation. No melena or hematochezia.  GENITOURINARY: No dysuria, frequency or hematuria  NEUROLOGICAL: No numbness or weakness  SKIN: No itching, burning, rashes, or lesions   All other review of systems is negative unless indicated above.    PHYSICAL EXAM:  General: Well developed well nourished appearing male lying in bed, slightly confused, following commands and responding appropriately.   Neurologic:  - Mental Status:  AAOx2 (not to date);  - Verbal:  Speech is dysarthric but understandable. Patient is able to identify three items correctly. Negative for expressive or receptive aphasia.   - Cranial Nerves II-XII:    II:  PERRLA; visual fields are full to confrontation  III, IV, VI:  EOMI, no nystagmus  V:  facial sensation is intact in the V1-V3 distribution bilaterally.  VII: Slight L sided facial droop.   VIII:  hearing is intact to finger rub  IX, X:  uvula is midline and soft palate rises symmetrically  XI:  head turning and shoulder shrug are intact bilaterally  XII:  tongue protrudes in the midline  - Motor:  Patient initially only moving LUE and LLE spontaneously, however when stimulated patient is able to move all extremities with 5/5 strength. Patient appears to respond to stimuli on both sides with negative extinction. Pronator drift is slightly positive for LUE. Negative bilaterally for lower extremities.   - Reflexes:  2+ and symmetric at the biceps and patellars  - Sensory:  intact to light touch on bilateral upper and lower extremities.  - Coordination:  no dysmetria  - Gait:  Deferred    MEDICATIONS:  MEDICATIONS  (STANDING):    MEDICATIONS  (PRN):      ALLERGIES:  Allergies    Allergy Status Unknown    Intolerances        LABS:              CAPILLARY BLOOD GLUCOSE      POCT Blood Glucose.: 100 mg/dL (31 Jan 2020 14:09)      RADIOLOGY & ADDITIONAL TESTS: Reviewed.

## 2020-01-31 NOTE — CHART NOTE - NSCHARTNOTEFT_GEN_A_CORE
Clinical Impact Nurse Practitioner Stroke Code Note Contribution to Care    Called to Stroke code on 712. Patient with new onset of acute dysarthria that began at 0830. Transferred from [place] for intervention. Patient expedited to CT scan. Hypertensive with MAPs 120. Labetalol 10 mg IVP X 2 given in 5 minute increments. Patient direct to intervention. Report to bedside anesthesia and cath lab team.

## 2020-01-31 NOTE — BRIEF OPERATIVE NOTE - OPERATION/FINDINGS
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  Procedure was aborted. TICI 0 at beginning, TICI 0 at end.  Full report to follow  Right groin/vasc access site was Perclosed and compressed, hemostasis was achieved, no hematoma.  Patient was extubated and transferred back to MICU.  Above d/w Dr. 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  Procedure was aborted. TICI 0 at beginning, TICI 0 at end.  Full report to follow  Right groin/vasc access site was Perclosed and compressed, hemostasis was achieved, no hematoma.  Patient was extubated and transferred back to MICU.  Above d/w Dr. Herrera

## 2020-01-31 NOTE — PATIENT PROFILE ADULT - NSPROEXTENSIONSOFSELF_GEN_A_NUR
Unable to assess, patient large stroke, does not respond to verbal stimulation [place] family [place].

## 2020-01-31 NOTE — STROKE CODE NOTE - NIH STROKE SCALE: 10. DYSARTHRIA, QM
(UN) Intubated or other physical barrier
(2) Severe dysarthria; patients speech is so slurred as to be unintelligible in the absence of or out of proportion to any dysphasia, or is mute/anarthric

## 2020-01-31 NOTE — PATIENT PROFILE ADULT - FALL HARM RISK
Recent stroke, current change in mental status, was agitated requiring sedation, intubation, restraints/other

## 2020-01-31 NOTE — PROGRESS NOTE ADULT - SUBJECTIVE AND OBJECTIVE BOX
**STROKE CODE CONSULT NOTE**    Last known well time/Time of onset of symptoms:    HPI:    PAST MEDICAL & SURGICAL HISTORY:      FAMILY HISTORY:      SOCIAL HISTORY:  Denies smoking, drinking, or drug use    ROS:  Constitutional: No fever, weight loss or fatigue  Eyes: No eye pain, visual disturbances, or discharge  ENMT:  No difficulty hearing, tinnitus, vertigo; No sinus or throat pain  Neck: No pain or stiffness  Respiratory: No cough, wheezing, chills or hemoptysis  Cardiovascular: No chest pain, palpitations, shortness of breath, dizziness or leg swelling  Gastrointestinal: No abdominal pain. No nausea, vomiting or hematemesis; No diarrhea or constipation. Nohematochezia.  Genitourinary: No dysuria, frequency, hematuria or incontinence  Neurological: As per HPI      MEDICATIONS  (STANDING):  aspirin enteric coated 81 milliGRAM(s) Oral daily  atorvastatin 80 milliGRAM(s) Oral at bedtime  chlorhexidine 2% Cloths 1 Application(s) Topical <User Schedule>  clopidogrel Tablet 75 milliGRAM(s) Oral daily  dexMEDEtomidine Infusion 0.194 MICROgram(s)/kG/Hr (4 mL/Hr) IV Continuous <Continuous>  norepinephrine Infusion 0.066 MICROgram(s)/kG/Min (10.185 mL/Hr) IV Continuous <Continuous>  propofol Infusion 4 MICROgram(s)/kG/Min (1.975 mL/Hr) IV Continuous <Continuous>  sodium chloride 0.9% Bolus 1000 milliLiter(s) IV Bolus once  sodium chloride 0.9%. 1000 milliLiter(s) (75 mL/Hr) IV Continuous <Continuous>    MEDICATIONS  (PRN):      Allergies  Allergy Status Unknown        Vital Signs Last 24 Hrs  T(C): 36.9 (31 Jan 2020 16:40), Max: 36.9 (31 Jan 2020 16:40)  T(F): 98.5 (31 Jan 2020 16:40), Max: 98.5 (31 Jan 2020 16:40)  HR: 76 (31 Jan 2020 16:55) (69 - 99)  BP: 137/65 (31 Jan 2020 16:50) (137/65 - 211/129)  BP(mean): 93 (31 Jan 2020 16:50) (93 - 98)  RR: 24 (31 Jan 2020 16:55) (14 - 27)  SpO2: 99% (31 Jan 2020 16:55) (98% - 100%)    PHYSICAL EXAM:  Constitutional: Middle- aged male, contracted    Neurologic:  -Mental status: Does not open eyes to voice or sternal rub. Intubated, received propofol and sedated. Patient is known to have dysarthria at baseline due to prior stroke.   -Cranial nerves:  II: +Blink to threat bilaterally.   III, IV, VI: extraocular movements are intact without nystagmus  VII: Face is symmetric with normal eye closure and smile.  VIII: hearing is intact to finger rub  IX, X: uvula is midline and soft palate rises symmetrically  XI: Head turning and shoulder shrug intact  XII: Tongue protrudes in the midline    -Motor:  Normal bulk and tone, strength 5/5 in bilateral upper and lower extremities.   strength 5/5.    -Sensation: Intact to light touch.  No neglect.   -Coordination: No dysmetria on finger-to-nose and heel-to-shin.  No clumsiness.  -Reflexes:downgoing toes bilaterally  -Gait: Narrow and steady. No ataxia.  Romberg negative    NIHSS:    Fingerstick Blood Glucose: CAPILLARY BLOOD GLUCOSE  100 (31 Jan 2020 17:58)      POCT Blood Glucose.: 100 mg/dL (31 Jan 2020 14:09)       LABS:                    RADIOLOGY & ADDITIONAL STUDIES:    IV-tPA (Y/N):                                   Bolus time:  Reason IV-tPA not given:    ASSESSMENT/PLAN:    64y Male w/ PMH coming in with      1)Admit to Stroke unit  -Start Aspirin 81 and Plavix 75  -Start Atorvastatin 80    2) Labs: Obtain Hemoglobin A1c, Lipid profile set, and TSH    3) Other tests:  -MRI  -LOC (NPO after midnight)  -SBP goal 160-200  -PT and OT eval    4)DVT ppx - Heparin SQ and SCDs **STROKE CODE NOTE**    Last known well time/Time of onset of symptoms: LKW 1/31 unspecified time, s/p thrombectomy    HPI:  65 y/o male with PMHx of HTN, CVA (residual dysarthria and left-hand weakness), and anxiety disorder who presented to [facility] this morning with worsening dysarthria. Patient was found to have acute R MCA stroke with perfusion defect on CT scan at [facility], transferred to [facility] for further management. On arrival to [facility], SC called - BPs 200s, NIHSS of 5, no tPA given at [facility] due to unclear LKW. CTH/CTA/CTP on arrival showed R sided perfusion defect and is consistent with subacute R MCA infarct. Patient was brought immediately to the neuro angio suite for emergent intervention, currently s/p attempted mechanical thrombectomy. Angio performed noted stump occlusion of mid right M1 with extensive JOSEPHINE collaterals, deemed unsuccessful as it is a chronic occlusion.  Procedure was aborted - TICI 0 at beginning, TICI 0 at end. Patient was brought to the MICU but after procedural sedation began to wear off, appeared to be contracted and posturing. Patient was intubated and sedated with propofol. Second SC called - NIHSS 29, no tPA given. Noted, in CT, patient began to have slight eyelid twitching.       PAST MEDICAL & SURGICAL HISTORY:      FAMILY HISTORY:    SOCIAL HISTORY:  Denies smoking, drinking, or drug use    ROS: Unable to assess as patient intubated and sedated   Neurological: As per HPI      MEDICATIONS  (STANDING):  aspirin enteric coated 81 milliGRAM(s) Oral daily  atorvastatin 80 milliGRAM(s) Oral at bedtime  chlorhexidine 2% Cloths 1 Application(s) Topical <User Schedule>  clopidogrel Tablet 75 milliGRAM(s) Oral daily  dexMEDEtomidine Infusion 0.194 MICROgram(s)/kG/Hr (4 mL/Hr) IV Continuous <Continuous>  norepinephrine Infusion 0.066 MICROgram(s)/kG/Min (10.185 mL/Hr) IV Continuous <Continuous>  propofol Infusion 4 MICROgram(s)/kG/Min (1.975 mL/Hr) IV Continuous <Continuous>  sodium chloride 0.9% Bolus 1000 milliLiter(s) IV Bolus once  sodium chloride 0.9%. 1000 milliLiter(s) (75 mL/Hr) IV Continuous <Continuous>    MEDICATIONS  (PRN):      Allergies  Allergy Status Unknown        Vital Signs Last 24 Hrs  T(C): 36.9 (31 Jan 2020 16:40), Max: 36.9 (31 Jan 2020 16:40)  T(F): 98.5 (31 Jan 2020 16:40), Max: 98.5 (31 Jan 2020 16:40)  HR: 76 (31 Jan 2020 16:55) (69 - 99)  BP: 137/65 (31 Jan 2020 16:50) (137/65 - 211/129)  BP(mean): 93 (31 Jan 2020 16:50) (93 - 98)  RR: 24 (31 Jan 2020 16:55) (14 - 27)  SpO2: 99% (31 Jan 2020 16:55) (98% - 100%)    PHYSICAL EXAM:  Constitutional: Middle- aged male, contracted    Neurologic:  -Mental status: Does not open eyes to voice or sternal rub. Intubated, received propofol and sedated. Patient is known to have dysarthria at baseline due to prior stroke.   -Cranial nerves:  II: +Blink to threat bilaterally.   III, IV, VI: Eyes fixed at midline, does not cross.  VII: L facial droop.   -Motor:  Unable to assess as patient contracted.   -Sensation: UE and LE withdraw from pain by rocio.   -Coordination: Unable to follow commands.  -Gait: Deferred.     NIHSS: 29, no tPA given    Fingerstick Blood Glucose: CAPILLARY BLOOD GLUCOSE  100 (31 Jan 2020 17:58)      POCT Blood Glucose.: 100 mg/dL (31 Jan 2020 14:09)       LABS:    RADIOLOGY & ADDITIONAL STUDIES:  - CT brain [facility] protocol 1/31  < from: CT Brain Stroke Protocol (01.31.20 @ 14:30) >  IMPRESSION:   Gyriform sites of cortical hyperdensity, presumed contrast enhancement, are favored to be subacute sites of infarction, superimposed on more chronic sites of right MCA infarction. Please correlate with outside noncontrast head CTto ensure this is not hemorrhage.    No visible transcortical infarction on the left side at this time.      - Second CT brain stroke protocol 1/31  < from: CT Brain Stroke Protocol (01.31.20 @ 18:17) >    IMPRESSION:   Compared to prior study from earlier in the day, no significant change in scattered enhancement of the right frontal and posterior temporal lobes, likely secondary to subacute infarction. Recommend correlation with prior noncontrast head CT to exclude hemorrhage.      IV-tPA (Y/N):   No  Reason IV-tPA not given: Isolated/mild neuro  deficits     ASSESSMENT/PLAN:  65 y/o male with PMHx of HTN, CVA (residual dysarthria and left-hand weakness), and anxiety disorder who presented to [facility] this morning with worsening dysarthria. Patient was found to have acute R MCA stroke with perfusion defect on CT scan at [facility], transferred to [facility] where CTH/CTA/CTP showed R sided perfusion defect consistent with subacute R MCA infarct. Patient is currently s/p attempted mechanical thrombectomy TICI 0 1/31,unsuccessful as it is a chronic occlusion. Second called due to rocio and posturing after post-procedural sedation began to wear off/ o be contracted and posturing. SC- NIHSS 29, no tPA given. CTH unchanged from previous CTH. Given observation of eyelid twitch and rocio in CT scan, neuro/stroke will workup seizure vs. stroke.     - Get veeg   - BP goal 120 - 180s   - Will load with 1g IV Keppra, then 500mg BID starting tonight 1/31   - Continue Aspirin 81mg PO and Plavix 75mg PO   - Continue Atorvastatin 80mg PO  - PT/OT

## 2020-01-31 NOTE — H&P ADULT - ASSESSMENT
63 y/o M with PMHx of HTN and CVA (2 months ago) transfered from [facility] s/p stroke (left facial and eyelid drooinga nbd speech slurriing), presenting for possible thrombectomy, as pateint outside window for tPA. 63 y/o M with PMHx of HTN and CVA (2 months ago) transfered from [facility] s/p stroke (left facial and eyelid drooinga nbd speech slurriing), presenting for possible thrombectomy, as pateint outside window for tPA - c/b reintubaion following thrombectomy, now with concern for seizure 65 y/o M with PMHx of HTN and CVA (2 months ago) transfered from [facility] s/p stroke (left facial and eyelid drooinga nbd speech slurriing), presenting for possible thrombectomy, as pateint outside window for tPA - c/b reintubaion following thrombectomy, now with concern for seizure    NEURO  #stroke vs seizure  Unclear etiology of events. CTH negative at [facility] and [facility].   - f/u vEEG  - minimize ativan and sedation forr appropriate EEG recs  - epilepsy following, apperciate recs  - stroke team folllowing apperciate recs      Patient with unknown menidcal history pending lab work - no other peoblems at this time.    PROHYLACTIC MEASURE  F: N/A  E: replete K<4, Mg<2  N: NONE    VTE Prophylaxis: Lovenox SubQ  C: FULL  D: MICU 63 y/o M with PMHx of HTN and CVA (2 months ago) transfered from [facility] s/p stroke (left facial and eyelid drooinga nbd speech slurriing), presenting for possible thrombectomy, as pateint outside window for tPA - c/b reintubaion following thrombectomy, now with concern for seizure    NEURO  #stroke vs seizure  Unclear etiology of events. CTH negative at [place] and [facility] (stroke code x2).   - f/u vEEG  - minimize ativan and sedation forr appropriate EEG recs  - epilepsy following, apperciate recs  - stroke team folllowing apperciate recs      #Intubated  Patient s/p emergent intubation for airway protection. Patient intubated and sedated.  - CPAP/SBT trial in AM - attempt to extubate  - wean off sedation as tolerated for appropriate EEG reading    #Hx of CVA  Per medical records patient with CVA, 2 months ago.  - no evidence of acute stroke at this AM  - f/u lipid profile, HgA1c, TSH  - prophylactic measure to be initiated when upon extubation: statin therapy, ASA, plavix, control of hypertension     CARDIO  #Hypertension  Patient with reported history of hypertension. Unclear home medications.  - obtain collateral   - SBP goal 120-150    RESP: REYSE  GI: REYES  : REYES  RENAL: REYES  ID: REYES      PROPHYLACTIC MEASURE  F: N/A  E: replete to K>4, Mg>2  N: NONE    VTE Prophylaxis: Lovenox SubQ  C: FULL  D: MICU 65 y/o M with PMHx of HTN and CVA (2 months ago) transferred from [facility] s/p stroke (left facial and eyelid drooling and speech slurring presenting for possible thrombectomy, as patient outside window for tPA - c/b reintubation following thrombectomy, now with concern for seizure    HEMODYNAMICS:  Patient euvolemic on exam - intubated, unable to assess mental status, making urine, extremities warm and well perfused.     NEURO  # r/o Stroke vs Seizure  Unclear etiology of events. CTH negative at [place] and [facility] (stroke code x2). s/p aborted thrombectomy with evidence of chronic ischemia. Repeat CT with evidence of contrast extravasation but no acute ischemic or hemorrhagic event. Patient with notable tremors and facial grimaces consistent with seizures.  - f/u vEEG  - Patient keppra loaded 1g, with initiation of Keppra 500mg BID  - minimize ativan and sedation for appropriate EEG evaluation  - epilepsy following, appreciate recs  - stroke team following appreciate recs      #Intubated  Patient s/p emergent intubation for airway protection. Patient intubated and sedated.  - CPAP/SBT trial in AM - attempt to extubate  - wean off sedation as tolerated for appropriate EEG reading    #Hx of CVA  Per medical records patient with CVA, 2 months ago.  - no evidence of acute stroke at this AM  - f/u lipid profile, HgA1c, TSH  - prophylactic measure to be initiated when upon extubation: statin therapy, ASA, plavix, control of hypertension     CARDIO  #Hypertension  Patient with reported history of hypertension. Unclear home medications. BP on arrival SBP >200, however currently normotensive (possibly secondary to sedation).   - obtain collateral   - SBP goal 120-150    RESP: REYES  GI: REYES  : REYES  RENAL: REYES  ID: REYES      PROPHYLACTIC MEASURE  F: N/A  E: replete to K>4, Mg>2  N: NONE    VTE Prophylaxis: Lovenox SubQ  C: FULL  D: MICU

## 2020-01-31 NOTE — STROKE CODE NOTE - NIH STROKE SCALE: 1A. LEVEL OF CONSCIOUSNESS, QM
(3) Responds only with reflex motor or autonomic effects or totally unresponsive, flaccid, and areflexic
(0) Alert; keenly responsive

## 2020-01-31 NOTE — H&P ADULT - NSHPREVIEWOFSYSTEMS_GEN_ALL_CORE
Patient intubated at time of admission, unable to obtain ROS. Patient altered and intubated at time of admission, unable to obtain ROS.

## 2020-02-01 LAB
ANION GAP SERPL CALC-SCNC: 9 MMOL/L — SIGNIFICANT CHANGE UP (ref 5–17)
APPEARANCE UR: CLEAR — SIGNIFICANT CHANGE UP
BILIRUB UR-MCNC: NEGATIVE — SIGNIFICANT CHANGE UP
BUN SERPL-MCNC: 10 MG/DL — SIGNIFICANT CHANGE UP (ref 7–23)
CALCIUM SERPL-MCNC: 8.1 MG/DL — LOW (ref 8.4–10.5)
CHLORIDE SERPL-SCNC: 112 MMOL/L — HIGH (ref 96–108)
CO2 SERPL-SCNC: 21 MMOL/L — LOW (ref 22–31)
COLOR SPEC: YELLOW — SIGNIFICANT CHANGE UP
CREAT SERPL-MCNC: 1.32 MG/DL — HIGH (ref 0.5–1.3)
DIFF PNL FLD: NEGATIVE — SIGNIFICANT CHANGE UP
GLUCOSE SERPL-MCNC: 101 MG/DL — HIGH (ref 70–99)
GLUCOSE UR QL: NEGATIVE — SIGNIFICANT CHANGE UP
HCT VFR BLD CALC: 38.5 % — LOW (ref 39–50)
HCV AB S/CO SERPL IA: 0.07 S/CO — SIGNIFICANT CHANGE UP
HCV AB SERPL-IMP: SIGNIFICANT CHANGE UP
HGB BLD-MCNC: 12.6 G/DL — LOW (ref 13–17)
KETONES UR-MCNC: NEGATIVE — SIGNIFICANT CHANGE UP
LEUKOCYTE ESTERASE UR-ACNC: NEGATIVE — SIGNIFICANT CHANGE UP
MAGNESIUM SERPL-MCNC: 1.9 MG/DL — SIGNIFICANT CHANGE UP (ref 1.6–2.6)
MCHC RBC-ENTMCNC: 31.5 PG — SIGNIFICANT CHANGE UP (ref 27–34)
MCHC RBC-ENTMCNC: 32.7 GM/DL — SIGNIFICANT CHANGE UP (ref 32–36)
MCV RBC AUTO: 96.3 FL — SIGNIFICANT CHANGE UP (ref 80–100)
NITRITE UR-MCNC: NEGATIVE — SIGNIFICANT CHANGE UP
NRBC # BLD: 0 /100 WBCS — SIGNIFICANT CHANGE UP (ref 0–0)
PH UR: 6.5 — SIGNIFICANT CHANGE UP (ref 5–8)
PHOSPHATE SERPL-MCNC: 2.3 MG/DL — LOW (ref 2.5–4.5)
PLATELET # BLD AUTO: 145 K/UL — LOW (ref 150–400)
POTASSIUM SERPL-MCNC: 4 MMOL/L — SIGNIFICANT CHANGE UP (ref 3.5–5.3)
POTASSIUM SERPL-SCNC: 4 MMOL/L — SIGNIFICANT CHANGE UP (ref 3.5–5.3)
PROT UR-MCNC: ABNORMAL MG/DL
RBC # BLD: 4 M/UL — LOW (ref 4.2–5.8)
RBC # FLD: 12.9 % — SIGNIFICANT CHANGE UP (ref 10.3–14.5)
SODIUM SERPL-SCNC: 142 MMOL/L — SIGNIFICANT CHANGE UP (ref 135–145)
SP GR SPEC: 1.01 — SIGNIFICANT CHANGE UP (ref 1–1.03)
UROBILINOGEN FLD QL: 0.2 E.U./DL — SIGNIFICANT CHANGE UP
WBC # BLD: 5.24 K/UL — SIGNIFICANT CHANGE UP (ref 3.8–10.5)
WBC # FLD AUTO: 5.24 K/UL — SIGNIFICANT CHANGE UP (ref 3.8–10.5)

## 2020-02-01 PROCEDURE — 99291 CRITICAL CARE FIRST HOUR: CPT

## 2020-02-01 PROCEDURE — 95720 EEG PHY/QHP EA INCR W/VEEG: CPT

## 2020-02-01 PROCEDURE — 71045 X-RAY EXAM CHEST 1 VIEW: CPT | Mod: 26,77

## 2020-02-01 PROCEDURE — 71045 X-RAY EXAM CHEST 1 VIEW: CPT | Mod: 26

## 2020-02-01 PROCEDURE — 99233 SBSQ HOSP IP/OBS HIGH 50: CPT | Mod: GC

## 2020-02-01 RX ORDER — ENOXAPARIN SODIUM 100 MG/ML
40 INJECTION SUBCUTANEOUS EVERY 24 HOURS
Refills: 0 | Status: DISCONTINUED | OUTPATIENT
Start: 2020-02-01 | End: 2020-02-10

## 2020-02-01 RX ORDER — MIDAZOLAM HYDROCHLORIDE 1 MG/ML
2 INJECTION, SOLUTION INTRAMUSCULAR; INTRAVENOUS ONCE
Refills: 0 | Status: DISCONTINUED | OUTPATIENT
Start: 2020-02-01 | End: 2020-02-01

## 2020-02-01 RX ORDER — MIDAZOLAM HYDROCHLORIDE 1 MG/ML
0.05 INJECTION, SOLUTION INTRAMUSCULAR; INTRAVENOUS
Qty: 100 | Refills: 0 | Status: DISCONTINUED | OUTPATIENT
Start: 2020-02-01 | End: 2020-02-01

## 2020-02-01 RX ORDER — CHLORHEXIDINE GLUCONATE 213 G/1000ML
15 SOLUTION TOPICAL EVERY 12 HOURS
Refills: 0 | Status: DISCONTINUED | OUTPATIENT
Start: 2020-02-01 | End: 2020-02-02

## 2020-02-01 RX ORDER — ACETAMINOPHEN 500 MG
650 TABLET ORAL EVERY 8 HOURS
Refills: 0 | Status: DISCONTINUED | OUTPATIENT
Start: 2020-02-01 | End: 2020-02-18

## 2020-02-01 RX ORDER — PROPOFOL 10 MG/ML
30 INJECTION, EMULSION INTRAVENOUS
Qty: 1000 | Refills: 0 | Status: DISCONTINUED | OUTPATIENT
Start: 2020-02-01 | End: 2020-02-02

## 2020-02-01 RX ORDER — NICARDIPINE HYDROCHLORIDE 30 MG/1
5 CAPSULE, EXTENDED RELEASE ORAL
Qty: 40 | Refills: 0 | Status: DISCONTINUED | OUTPATIENT
Start: 2020-02-01 | End: 2020-02-02

## 2020-02-01 RX ORDER — ACETAMINOPHEN 500 MG
1000 TABLET ORAL ONCE
Refills: 0 | Status: DISCONTINUED | OUTPATIENT
Start: 2020-02-01 | End: 2020-02-01

## 2020-02-01 RX ADMIN — CHLORHEXIDINE GLUCONATE 15 MILLILITER(S): 213 SOLUTION TOPICAL at 19:20

## 2020-02-01 RX ADMIN — PROPOFOL 1.98 MICROGRAM(S)/KG/MIN: 10 INJECTION, EMULSION INTRAVENOUS at 00:04

## 2020-02-01 RX ADMIN — NICARDIPINE HYDROCHLORIDE 25 MG/HR: 30 CAPSULE, EXTENDED RELEASE ORAL at 12:19

## 2020-02-01 RX ADMIN — MIDAZOLAM HYDROCHLORIDE 2 MILLIGRAM(S): 1 INJECTION, SOLUTION INTRAMUSCULAR; INTRAVENOUS at 01:24

## 2020-02-01 RX ADMIN — PROPOFOL 1.98 MICROGRAM(S)/KG/MIN: 10 INJECTION, EMULSION INTRAVENOUS at 05:31

## 2020-02-01 RX ADMIN — SODIUM CHLORIDE 75 MILLILITER(S): 9 INJECTION INTRAMUSCULAR; INTRAVENOUS; SUBCUTANEOUS at 23:53

## 2020-02-01 RX ADMIN — ENOXAPARIN SODIUM 40 MILLIGRAM(S): 100 INJECTION SUBCUTANEOUS at 19:20

## 2020-02-01 RX ADMIN — LEVETIRACETAM 400 MILLIGRAM(S): 250 TABLET, FILM COATED ORAL at 21:44

## 2020-02-01 RX ADMIN — MIDAZOLAM HYDROCHLORIDE 4.12 MG/KG/HR: 1 INJECTION, SOLUTION INTRAMUSCULAR; INTRAVENOUS at 01:56

## 2020-02-01 RX ADMIN — Medication 62.5 MILLIMOLE(S): at 12:18

## 2020-02-01 RX ADMIN — Medication 650 MILLIGRAM(S): at 16:48

## 2020-02-01 RX ADMIN — ATORVASTATIN CALCIUM 80 MILLIGRAM(S): 80 TABLET, FILM COATED ORAL at 21:44

## 2020-02-01 RX ADMIN — LEVETIRACETAM 400 MILLIGRAM(S): 250 TABLET, FILM COATED ORAL at 09:54

## 2020-02-01 RX ADMIN — Medication 650 MILLIGRAM(S): at 18:00

## 2020-02-01 RX ADMIN — SODIUM CHLORIDE 75 MILLILITER(S): 9 INJECTION INTRAMUSCULAR; INTRAVENOUS; SUBCUTANEOUS at 07:08

## 2020-02-01 RX ADMIN — CHLORHEXIDINE GLUCONATE 1 APPLICATION(S): 213 SOLUTION TOPICAL at 05:32

## 2020-02-01 NOTE — PROGRESS NOTE ADULT - SUBJECTIVE AND OBJECTIVE BOX
***NOTE INCOMPLETE*** OVERNIGHT EVENTS: RODY    SUBJECTIVE / INTERVAL HPI: Patient seen and examined at bedside. Patient intubated and somewhat sedated, unable to assess ROS. SBP mackenzie to 200, started cardine drip and. EEG showed no epileptiform wave forms. Versed was discontinued. NG tube placed and confirmed. Patient spiked a fever, blood cx sent, UA negative, sputum cx sent, and CXR clear.     VITAL SIGNS:  Vital Signs Last 24 Hrs  T(C): 37.9 (2020 18:57), Max: 38.3 (2020 15:00)  T(F): 100.2 (2020 18:57), Max: 101 (2020 15:00)  HR: 89 (2020 21:00) (68 - 131)  BP: 161/105 (2020 02:00) (161/105 - 161/105)  BP(mean): 129 (2020 02:00) (129 - 129)  RR: 16 (2020 21:00) (12 - 30)  SpO2: 99% (2020 21:00) (95% - 100%)  I&O's Summary    2020 07:01  -  2020 07:00  --------------------------------------------------------  IN: 3292.3 mL / OUT: 365 mL / NET: 2927.3 mL    2020 07:01  -  2020 22:16  --------------------------------------------------------  IN: 1475.2 mL / OUT: 1360 mL / NET: 115.2 mL        PHYSICAL EXAM:    General: intubated and somewhat sedated  HEENT: NC/AT; PERRL, anicteric sclera; MMM  Neck: supple  Cardiovascular: +S1/S2; RRR  Respiratory: CTA B/L; no W/R/R  Gastrointestinal: soft, NT/ND; +BSx4  Extremities: WWP; no edema, clubbing or cyanosis  Vascular: 2+ radial, DP/PT pulses B/L  Neurological: intubated and somewhat sedated. Unable to assess true neurological exam    MEDICATIONS:  MEDICATIONS  (STANDING):  aspirin enteric coated 81 milliGRAM(s) Oral daily  atorvastatin 80 milliGRAM(s) Oral at bedtime  chlorhexidine 0.12% Liquid 15 milliLiter(s) Oral Mucosa every 12 hours  chlorhexidine 2% Cloths 1 Application(s) Topical <User Schedule>  clopidogrel Tablet 75 milliGRAM(s) Oral daily  dexMEDEtomidine Infusion 0.194 MICROgram(s)/kG/Hr (4 mL/Hr) IV Continuous <Continuous>  enoxaparin Injectable 40 milliGRAM(s) SubCutaneous every 24 hours  levETIRAcetam  IVPB 500 milliGRAM(s) IV Intermittent every 12 hours  niCARdipine Infusion 5 mG/Hr (25 mL/Hr) IV Continuous <Continuous>  propofol Infusion. 30 MICROgram(s)/kG/Min (14.814 mL/Hr) IV Continuous <Continuous>  sodium chloride 0.9%. 1000 milliLiter(s) (75 mL/Hr) IV Continuous <Continuous>    MEDICATIONS  (PRN):  acetaminophen    Suspension .. 650 milliGRAM(s) Oral every 8 hours PRN Temp greater or equal to 38C (100.4F)      ALLERGIES:  Allergies    Allergy Status Unknown    Intolerances        LABS:                        12.6   5.24  )-----------( 145      ( 2020 05:51 )             38.5     02-01    142  |  112<H>  |  10  ----------------------------<  101<H>  4.0   |  21<L>  |  1.32<H>    Ca    8.1<L>      2020 05:51  Phos  2.3     02-01  Mg     1.9     02-01    TPro  5.5<L>  /  Alb  3.5  /  TBili  0.7  /  DBili  x   /  AST  19  /  ALT  11  /  AlkPhos  60  01-      Urinalysis Basic - ( 2020 16:40 )    Color: Yellow / Appearance: Clear / S.010 / pH: x  Gluc: x / Ketone: NEGATIVE  / Bili: Negative / Urobili: 0.2 E.U./dL   Blood: x / Protein: Trace mg/dL / Nitrite: NEGATIVE   Leuk Esterase: NEGATIVE / RBC: < 5 /HPF / WBC < 5 /HPF   Sq Epi: x / Non Sq Epi: 0-5 /HPF / Bacteria: None /HPF      CAPILLARY BLOOD GLUCOSE  100 (2020 17:58)      POCT Blood Glucose.: 100 mg/dL (2020 14:09)      RADIOLOGY & ADDITIONAL TESTS: Reviewed.

## 2020-02-01 NOTE — PROGRESS NOTE ADULT - ASSESSMENT
65 y/o M with PMHx of HTN and CVA (2 months ago) transferred from [facility] s/p stroke (left facial and eyelid drooling and speech slurring presenting for possible thrombectomy, as patient outside window for tPA - c/b reintubation following thrombectomy, now with concern for seizure    HEMODYNAMICS:  Patient euvolemic on exam - intubated, unable to assess mental status, making urine, extremities warm and well perfused.     NEURO  # r/o Stroke vs Seizure  Unclear etiology of events. CTH negative at [place] and [facility] (stroke code x2). s/p aborted thrombectomy with evidence of chronic ischemia. Repeat CT with evidence of contrast extravasation but no acute ischemic or hemorrhagic event. Patient with notable tremors and facial grimaces consistent with seizures.  - vEEG negative for seizures  - c/w Keppra 500mg BID  - minimize ativan and sedation for appropriate EEG evaluation  - epilepsy following, appreciate recs  - stroke team following appreciate recs      #Intubated  Patient s/p emergent intubation for airway protection. Patient intubated and sedated.  - CPAP/SBT trial in AM - attempt to extubate  - wean off sedation as tolerated for appropriate EEG reading    #Hx of CVA  Per medical records patient with CVA, 2 months ago.  - no evidence of acute stroke  - prophylactic measure to be initiated when upon extubation: statin therapy, ASA, plavix, control of hypertension     CARDIO  #Hypertension  Patient with reported history of hypertension. Unclear home medications. BP on arrival SBP >200, however currently normotensive (possibly secondary to sedation).   - obtain collateral   - SBP goal 120-150    RESP: REYES  GI: REYES  : REYES  RENAL: REYES  ID: REYES      PROPHYLACTIC MEASURE  F: N/A  E: replete to K>4, Mg>2  N: NONE    VTE Prophylaxis: Lovenox SubQ  C: FULL  D: MICU

## 2020-02-01 NOTE — EEG REPORT - NS EEG TEXT BOX
Continuous EEG Report  1/31/2020 @ 10:13:08 PM to next morning @ 07:00 AM    Background:  continuous, with predominantly delta with overriding beta frequencies.  Symmetry:  No persistent asymmetries of voltage or frequency.  Posterior Dominant Rhythm:  No clear PDR   Organization: Rudimentary.  Voltage:  Low (most <20uV LBP Peak-Trough)  Variability: No. 						  Reactivity: No.  N2 sleep: Absent.  Spontaneous Activity:  No epileptiform discharges.  Periodic/rhythmic activity:  None.  Events:  No electrographic seizures or significant clinical events.  Provocations:  Hyperventilation and Photic stimulation: was not performed.    Daily Summary:    Severe generalized slowing with overriding beta activity, likely due to sedating medications.  No epileptiform activity and no significant clinical events occurred.    Read by:  Ashly Marquis MD

## 2020-02-01 NOTE — PROGRESS NOTE ADULT - SUBJECTIVE AND OBJECTIVE BOX
Neurology Stroke Progress Note    INTERVAL HPI/OVERNIGHT EVENTS:  Patient seen and examined. Intubated, off versed and propofol infusion for exam.      MEDICATIONS  (STANDING):  aspirin enteric coated 81 milliGRAM(s) Oral daily  atorvastatin 80 milliGRAM(s) Oral at bedtime  chlorhexidine 0.12% Liquid 15 milliLiter(s) Oral Mucosa every 12 hours  chlorhexidine 2% Cloths 1 Application(s) Topical <User Schedule>  clopidogrel Tablet 75 milliGRAM(s) Oral daily  dexMEDEtomidine Infusion 0.194 MICROgram(s)/kG/Hr (4 mL/Hr) IV Continuous <Continuous>  enoxaparin Injectable 40 milliGRAM(s) SubCutaneous every 24 hours  levETIRAcetam  IVPB 500 milliGRAM(s) IV Intermittent every 12 hours  niCARdipine Infusion 5 mG/Hr (25 mL/Hr) IV Continuous <Continuous>  propofol Infusion. 30 MICROgram(s)/kG/Min (14.814 mL/Hr) IV Continuous <Continuous>  sodium chloride 0.9%. 1000 milliLiter(s) (75 mL/Hr) IV Continuous <Continuous>    MEDICATIONS  (PRN):  acetaminophen    Suspension .. 650 milliGRAM(s) Oral every 8 hours PRN Temp greater or equal to 38C (100.4F)    Allergies  Allergy Status Unknown    ROS: As per HPI, otherwise negative    Vital Signs Last 24 Hrs  T(C): 38.3 (2020 15:00), Max: 38.3 (2020 15:00)  T(F): 101 (2020 15:00), Max: 101 (2020 15:00)  HR: 129 (2020 16:06) (60 - 131)  BP: 161/105 (2020 02:00) (91/50 - 161/105)  BP(mean): 129 (2020 02:00) (67 - 129)  RR: 30 (2020 16:06) (12 - 33)  SpO2: 97% (2020 16:06) (95% - 100%)    Physical exam:  General: Intubated, although propofol held, still appears sedated.     Neurologic:  Mental status: Not awake or alert. Under sedative effects. Does not follows commands, does not open eyes to voice or noxious stimuli. Able to use right hand to grasp my hand.     Cranial nerves:   II: +Blink to threat bilaterally.    VII: Unable to assess facial droop due to placement of ETT tube.   Motor/Sensation: Moving all extremities spontaneously. Right UE withdraws to noxious, left UE contracts in response to pain. Triple flexion of bilateral lower extremities to pain stimuli.   Reflexes: R downgoing, L upgoing toe.   Gait: Deferred.        LABS:                        12.6   5.24  )-----------( 145      ( 2020 05:51 )             38.5     02-    142  |  112<H>  |  10  ----------------------------<  101<H>  4.0   |  21<L>  |  1.32<H>    Ca    8.1<L>      2020 05:51  Phos  2.3       Mg     1.9         TPro  5.5<L>  /  Alb  3.5  /  TBili  0.7  /  DBili  x   /  AST  19  /  ALT  11  /  AlkPhos  60        Urinalysis Basic - ( 2020 16:40 )    Color: Yellow / Appearance: Clear / S.010 / pH: x  Gluc: x / Ketone: NEGATIVE  / Bili: Negative / Urobili: 0.2 E.U./dL   Blood: x / Protein: Trace mg/dL / Nitrite: NEGATIVE   Leuk Esterase: NEGATIVE / RBC: < 5 /HPF / WBC < 5 /HPF   Sq Epi: x / Non Sq Epi: 0-5 /HPF / Bacteria: None /HPF      RADIOLOGY & ADDITIONAL TESTS:  - CT brain second stroke code   CT Brain Stroke Protocol (20 @ 18:17) >    IMPRESSION:   Compared to prior study from earlier in the day, no significant change in scattered enhancement of the right frontal and posterior temporal lobes, likely secondary to subacute infarction. Recommend correlation with prior noncontrast head CT to exclude hemorrhage.      Assessment and Plan  65 y/o male with PMHx of HTN, CVA (residual dysarthria and left-hand weakness), and anxiety disorder who presented to [place] this morning with worsening dysarthria. Patient was found to have acute R MCA stroke with perfusion defect on CT scan at [place], transferred to [facility] for further management. CTH/CTA/CTP on arrival showed R sided perfusion defect consistent with subacute R MCA infarct. 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 Patient was brought to the MICU but after procedural sedation began to wear off, appeared to be contracted with intermittent left leg jerking. Second SC called, patient intubated, and repeat CTH remained unchanged. vEEG placed O/N which revealed generalized slowing with overriding beta activity, likely due to sedating medications without  epileptiform activity. Will continue to monitor off sedation for improvements in exam and possible extubation when tolerable.      1)Secondary stroke prevention  -Continue ASA 81mg PO daily and Plavix 75mg PO daily  -Continue Atorvastatin 80mg PO daily    2) Stroke risk factors  -HTN, on cardene drip   -Previous CVA (baseline dysarthria and left-hand weakness)    3) Further workup   - If patient not awake by 2/2, repeat CT scan.  - SBP goal < 180s   - MRI brain   - Follow up with EEG results, continue with Keppra 	  - ECHO/LOC  - PT/OT	    4)DVT prophylaxis   -Heparin SQ  and SCDs

## 2020-02-01 NOTE — PROCEDURE NOTE - NSTOLERANCE_GEN_A_CORE
Patient tolerated procedure well.
Patient tolerated procedure well.
Carlita Ambriz  (RN)  2018 00:31:26

## 2020-02-02 LAB
ALBUMIN SERPL ELPH-MCNC: 2.8 G/DL — LOW (ref 3.3–5)
ALP SERPL-CCNC: 45 U/L — SIGNIFICANT CHANGE UP (ref 40–120)
ALT FLD-CCNC: 11 U/L — SIGNIFICANT CHANGE UP (ref 10–45)
ANION GAP SERPL CALC-SCNC: 10 MMOL/L — SIGNIFICANT CHANGE UP (ref 5–17)
ANION GAP SERPL CALC-SCNC: 12 MMOL/L — SIGNIFICANT CHANGE UP (ref 5–17)
ANION GAP SERPL CALC-SCNC: 8 MMOL/L — SIGNIFICANT CHANGE UP (ref 5–17)
AST SERPL-CCNC: 23 U/L — SIGNIFICANT CHANGE UP (ref 10–40)
BASOPHILS # BLD AUTO: 0.03 K/UL — SIGNIFICANT CHANGE UP (ref 0–0.2)
BASOPHILS NFR BLD AUTO: 0.5 % — SIGNIFICANT CHANGE UP (ref 0–2)
BILIRUB SERPL-MCNC: 0.9 MG/DL — SIGNIFICANT CHANGE UP (ref 0.2–1.2)
BUN SERPL-MCNC: 10 MG/DL — SIGNIFICANT CHANGE UP (ref 7–23)
BUN SERPL-MCNC: 10 MG/DL — SIGNIFICANT CHANGE UP (ref 7–23)
BUN SERPL-MCNC: 9 MG/DL — SIGNIFICANT CHANGE UP (ref 7–23)
CALCIUM SERPL-MCNC: 7.2 MG/DL — LOW (ref 8.4–10.5)
CALCIUM SERPL-MCNC: 8.8 MG/DL — SIGNIFICANT CHANGE UP (ref 8.4–10.5)
CALCIUM SERPL-MCNC: 9.3 MG/DL — SIGNIFICANT CHANGE UP (ref 8.4–10.5)
CHLORIDE SERPL-SCNC: 114 MMOL/L — HIGH (ref 96–108)
CHLORIDE SERPL-SCNC: 118 MMOL/L — HIGH (ref 96–108)
CHLORIDE SERPL-SCNC: 120 MMOL/L — HIGH (ref 96–108)
CHLORIDE UR-SCNC: 152 MMOL/L — SIGNIFICANT CHANGE UP
CO2 SERPL-SCNC: 16 MMOL/L — LOW (ref 22–31)
CO2 SERPL-SCNC: 19 MMOL/L — LOW (ref 22–31)
CO2 SERPL-SCNC: 20 MMOL/L — LOW (ref 22–31)
CREAT ?TM UR-MCNC: 33 MG/DL — SIGNIFICANT CHANGE UP
CREAT SERPL-MCNC: 1.01 MG/DL — SIGNIFICANT CHANGE UP (ref 0.5–1.3)
CREAT SERPL-MCNC: 1.09 MG/DL — SIGNIFICANT CHANGE UP (ref 0.5–1.3)
CREAT SERPL-MCNC: 1.15 MG/DL — SIGNIFICANT CHANGE UP (ref 0.5–1.3)
EOSINOPHIL # BLD AUTO: 0.21 K/UL — SIGNIFICANT CHANGE UP (ref 0–0.5)
EOSINOPHIL NFR BLD AUTO: 3.2 % — SIGNIFICANT CHANGE UP (ref 0–6)
GLUCOSE SERPL-MCNC: 108 MG/DL — HIGH (ref 70–99)
GLUCOSE SERPL-MCNC: 111 MG/DL — HIGH (ref 70–99)
GLUCOSE SERPL-MCNC: 93 MG/DL — SIGNIFICANT CHANGE UP (ref 70–99)
HCT VFR BLD CALC: 35.8 % — LOW (ref 39–50)
HGB BLD-MCNC: 11.8 G/DL — LOW (ref 13–17)
IMM GRANULOCYTES NFR BLD AUTO: 0.3 % — SIGNIFICANT CHANGE UP (ref 0–1.5)
LYMPHOCYTES # BLD AUTO: 0.76 K/UL — LOW (ref 1–3.3)
LYMPHOCYTES # BLD AUTO: 11.5 % — LOW (ref 13–44)
MAGNESIUM SERPL-MCNC: 1.6 MG/DL — SIGNIFICANT CHANGE UP (ref 1.6–2.6)
MAGNESIUM SERPL-MCNC: 2 MG/DL — SIGNIFICANT CHANGE UP (ref 1.6–2.6)
MAGNESIUM SERPL-MCNC: 2 MG/DL — SIGNIFICANT CHANGE UP (ref 1.6–2.6)
MCHC RBC-ENTMCNC: 32.2 PG — SIGNIFICANT CHANGE UP (ref 27–34)
MCHC RBC-ENTMCNC: 33 GM/DL — SIGNIFICANT CHANGE UP (ref 32–36)
MCV RBC AUTO: 97.5 FL — SIGNIFICANT CHANGE UP (ref 80–100)
MONOCYTES # BLD AUTO: 0.61 K/UL — SIGNIFICANT CHANGE UP (ref 0–0.9)
MONOCYTES NFR BLD AUTO: 9.3 % — SIGNIFICANT CHANGE UP (ref 2–14)
NEUTROPHILS # BLD AUTO: 4.96 K/UL — SIGNIFICANT CHANGE UP (ref 1.8–7.4)
NEUTROPHILS NFR BLD AUTO: 75.2 % — SIGNIFICANT CHANGE UP (ref 43–77)
NRBC # BLD: 0 /100 WBCS — SIGNIFICANT CHANGE UP (ref 0–0)
OSMOLALITY UR: 384 MOSMOL/KG — SIGNIFICANT CHANGE UP (ref 100–650)
PHOSPHATE 24H UR-MCNC: 19.6 MG/DL — SIGNIFICANT CHANGE UP
PHOSPHATE SERPL-MCNC: 1.5 MG/DL — LOW (ref 2.5–4.5)
PHOSPHATE SERPL-MCNC: 1.7 MG/DL — LOW (ref 2.5–4.5)
PLATELET # BLD AUTO: 148 K/UL — LOW (ref 150–400)
POTASSIUM SERPL-MCNC: 3.2 MMOL/L — LOW (ref 3.5–5.3)
POTASSIUM SERPL-MCNC: 4.1 MMOL/L — SIGNIFICANT CHANGE UP (ref 3.5–5.3)
POTASSIUM SERPL-MCNC: 4.7 MMOL/L — SIGNIFICANT CHANGE UP (ref 3.5–5.3)
POTASSIUM SERPL-SCNC: 3.2 MMOL/L — LOW (ref 3.5–5.3)
POTASSIUM SERPL-SCNC: 4.1 MMOL/L — SIGNIFICANT CHANGE UP (ref 3.5–5.3)
POTASSIUM SERPL-SCNC: 4.7 MMOL/L — SIGNIFICANT CHANGE UP (ref 3.5–5.3)
PROT SERPL-MCNC: 4.7 G/DL — LOW (ref 6–8.3)
RBC # BLD: 3.67 M/UL — LOW (ref 4.2–5.8)
RBC # FLD: 13.2 % — SIGNIFICANT CHANGE UP (ref 10.3–14.5)
SODIUM SERPL-SCNC: 145 MMOL/L — SIGNIFICANT CHANGE UP (ref 135–145)
SODIUM SERPL-SCNC: 146 MMOL/L — HIGH (ref 135–145)
SODIUM SERPL-SCNC: 146 MMOL/L — HIGH (ref 135–145)
SODIUM UR-SCNC: 130 MMOL/L — SIGNIFICANT CHANGE UP
WBC # BLD: 6.59 K/UL — SIGNIFICANT CHANGE UP (ref 3.8–10.5)
WBC # FLD AUTO: 6.59 K/UL — SIGNIFICANT CHANGE UP (ref 3.8–10.5)

## 2020-02-02 PROCEDURE — 99292 CRITICAL CARE ADDL 30 MIN: CPT

## 2020-02-02 PROCEDURE — 95720 EEG PHY/QHP EA INCR W/VEEG: CPT

## 2020-02-02 PROCEDURE — 71045 X-RAY EXAM CHEST 1 VIEW: CPT | Mod: 26

## 2020-02-02 PROCEDURE — 99233 SBSQ HOSP IP/OBS HIGH 50: CPT | Mod: GC

## 2020-02-02 PROCEDURE — 99291 CRITICAL CARE FIRST HOUR: CPT

## 2020-02-02 RX ORDER — DEXMEDETOMIDINE HYDROCHLORIDE IN 0.9% SODIUM CHLORIDE 4 UG/ML
0.2 INJECTION INTRAVENOUS
Qty: 200 | Refills: 0 | Status: DISCONTINUED | OUTPATIENT
Start: 2020-02-02 | End: 2020-02-02

## 2020-02-02 RX ORDER — QUETIAPINE FUMARATE 200 MG/1
25 TABLET, FILM COATED ORAL EVERY 12 HOURS
Refills: 0 | Status: DISCONTINUED | OUTPATIENT
Start: 2020-02-02 | End: 2020-02-02

## 2020-02-02 RX ORDER — NICARDIPINE HYDROCHLORIDE 30 MG/1
5 CAPSULE, EXTENDED RELEASE ORAL
Qty: 40 | Refills: 0 | Status: DISCONTINUED | OUTPATIENT
Start: 2020-02-02 | End: 2020-02-03

## 2020-02-02 RX ORDER — TRAZODONE HCL 50 MG
100 TABLET ORAL AT BEDTIME
Refills: 0 | Status: DISCONTINUED | OUTPATIENT
Start: 2020-02-02 | End: 2020-02-02

## 2020-02-02 RX ORDER — CLONAZEPAM 1 MG
1 TABLET ORAL EVERY 12 HOURS
Refills: 0 | Status: DISCONTINUED | OUTPATIENT
Start: 2020-02-02 | End: 2020-02-03

## 2020-02-02 RX ORDER — AMLODIPINE BESYLATE 2.5 MG/1
5 TABLET ORAL DAILY
Refills: 0 | Status: DISCONTINUED | OUTPATIENT
Start: 2020-02-02 | End: 2020-02-04

## 2020-02-02 RX ORDER — POTASSIUM CHLORIDE 20 MEQ
40 PACKET (EA) ORAL
Refills: 0 | Status: DISCONTINUED | OUTPATIENT
Start: 2020-02-02 | End: 2020-02-02

## 2020-02-02 RX ORDER — POTASSIUM PHOSPHATE, MONOBASIC POTASSIUM PHOSPHATE, DIBASIC 236; 224 MG/ML; MG/ML
30 INJECTION, SOLUTION INTRAVENOUS ONCE
Refills: 0 | Status: COMPLETED | OUTPATIENT
Start: 2020-02-02 | End: 2020-02-02

## 2020-02-02 RX ORDER — POTASSIUM CHLORIDE 20 MEQ
40 PACKET (EA) ORAL
Refills: 0 | Status: COMPLETED | OUTPATIENT
Start: 2020-02-02 | End: 2020-02-02

## 2020-02-02 RX ADMIN — ENOXAPARIN SODIUM 40 MILLIGRAM(S): 100 INJECTION SUBCUTANEOUS at 21:29

## 2020-02-02 RX ADMIN — DEXMEDETOMIDINE HYDROCHLORIDE IN 0.9% SODIUM CHLORIDE 4 MICROGRAM(S)/KG/HR: 4 INJECTION INTRAVENOUS at 21:28

## 2020-02-02 RX ADMIN — Medication 81 MILLIGRAM(S): at 12:57

## 2020-02-02 RX ADMIN — ATORVASTATIN CALCIUM 80 MILLIGRAM(S): 80 TABLET, FILM COATED ORAL at 21:28

## 2020-02-02 RX ADMIN — AMLODIPINE BESYLATE 5 MILLIGRAM(S): 2.5 TABLET ORAL at 14:40

## 2020-02-02 RX ADMIN — DEXMEDETOMIDINE HYDROCHLORIDE IN 0.9% SODIUM CHLORIDE 4 MICROGRAM(S)/KG/HR: 4 INJECTION INTRAVENOUS at 09:30

## 2020-02-02 RX ADMIN — NICARDIPINE HYDROCHLORIDE 25 MG/HR: 30 CAPSULE, EXTENDED RELEASE ORAL at 08:33

## 2020-02-02 RX ADMIN — Medication 40 MILLIEQUIVALENT(S): at 11:12

## 2020-02-02 RX ADMIN — NICARDIPINE HYDROCHLORIDE 25 MG/HR: 30 CAPSULE, EXTENDED RELEASE ORAL at 18:22

## 2020-02-02 RX ADMIN — POTASSIUM PHOSPHATE, MONOBASIC POTASSIUM PHOSPHATE, DIBASIC 83.33 MILLIMOLE(S): 236; 224 INJECTION, SOLUTION INTRAVENOUS at 15:55

## 2020-02-02 RX ADMIN — DEXMEDETOMIDINE HYDROCHLORIDE IN 0.9% SODIUM CHLORIDE 4 MICROGRAM(S)/KG/HR: 4 INJECTION INTRAVENOUS at 18:22

## 2020-02-02 RX ADMIN — Medication 2 MILLIGRAM(S): at 21:28

## 2020-02-02 RX ADMIN — Medication 40 MILLIEQUIVALENT(S): at 07:38

## 2020-02-02 RX ADMIN — Medication 1 MILLIGRAM(S): at 23:14

## 2020-02-02 RX ADMIN — PROPOFOL 14.81 MICROGRAM(S)/KG/MIN: 10 INJECTION, EMULSION INTRAVENOUS at 05:16

## 2020-02-02 RX ADMIN — CHLORHEXIDINE GLUCONATE 1 APPLICATION(S): 213 SOLUTION TOPICAL at 05:12

## 2020-02-02 RX ADMIN — CHLORHEXIDINE GLUCONATE 15 MILLILITER(S): 213 SOLUTION TOPICAL at 05:12

## 2020-02-02 RX ADMIN — LEVETIRACETAM 400 MILLIGRAM(S): 250 TABLET, FILM COATED ORAL at 21:29

## 2020-02-02 RX ADMIN — CLOPIDOGREL BISULFATE 75 MILLIGRAM(S): 75 TABLET, FILM COATED ORAL at 12:57

## 2020-02-02 RX ADMIN — LEVETIRACETAM 400 MILLIGRAM(S): 250 TABLET, FILM COATED ORAL at 11:12

## 2020-02-02 NOTE — DIETITIAN INITIAL EVALUATION ADULT. - ENERGY NEEDS
Ht: 5'8" (estimated), Wt: 82.3kg, IBW: 70kg, 117.5%IBW.   Needs calculated based on [facility] standards of care. IBW used for calculations 2/2 vent. Needs adjusted for vent.  Defer fluids to primary care team 2/2 hypernatremia and receiving IV fluids.

## 2020-02-02 NOTE — PROGRESS NOTE ADULT - SUBJECTIVE AND OBJECTIVE BOX
Neurology Stroke Progress Note    INTERVAL HPI/OVERNIGHT EVENTS:  Patient seen and examined. Pt now extubated, examined off sedation. Pt's cousin at bedside, recommended using patient's nickname "Raj." Pt responds to nickname. Unable to obtain ROS due to limited speech.     MEDICATIONS  (STANDING):  amLODIPine   Tablet 5 milliGRAM(s) Oral daily  aspirin enteric coated 81 milliGRAM(s) Oral daily  atorvastatin 80 milliGRAM(s) Oral at bedtime  chlorhexidine 2% Cloths 1 Application(s) Topical <User Schedule>  clopidogrel Tablet 75 milliGRAM(s) Oral daily  dexMEDEtomidine Infusion 0.194 MICROgram(s)/kG/Hr (4 mL/Hr) IV Continuous <Continuous>  enoxaparin Injectable 40 milliGRAM(s) SubCutaneous every 24 hours  levETIRAcetam  IVPB 500 milliGRAM(s) IV Intermittent every 12 hours  niCARdipine Infusion 5 mG/Hr (25 mL/Hr) IV Continuous <Continuous>  sodium chloride 0.9%. 1000 milliLiter(s) (75 mL/Hr) IV Continuous <Continuous>    MEDICATIONS  (PRN):  acetaminophen    Suspension .. 650 milliGRAM(s) Oral every 8 hours PRN Temp greater or equal to 38C (100.4F)      Allergies    Allergy Status Unknown    Intolerances        ROS: As per HPI, otherwise negative    Vital Signs Last 24 Hrs  T(C): 37.9 (2020 19:00), Max: 37.9 (2020 19:00)  T(F): 100.2 (2020 19:00), Max: 100.2 (2020 19:00)  HR: 90 (2020 20:00) (77 - 120)  BP: 163/96 (2020 08:30) (163/96 - 163/96)  BP(mean): 123 (2020 08:30) (123 - 123)  RR: 14 (2020 20:00) (5 - 32)  SpO2: 96% (2020 20:00) (93% - 100%)    Physical exam:  General: awake and alert, sitting comfortably, no acute distress  CV: RRR, no murmurs  Pulm: CTA bilaterally  Neurologic:  Mental status: awake, alert, oriented to person, place, and time. Speech is fluent - able to name, repeat, and describe picture fully. Follows commands. Attention/concentration intact. No dysarthria, no aphasia.  Cranial nerves:   II: visual fields are full to confrontation. pupils equally round and reactive to light,   III, IV, VI: EOMI without nystagmus  V:  V1-V3 sensation intact,   VII: no facial droop, facie is symmetric with normal eye closure and smile  VIII: hearing is intact to finger rub  IX, X: Uvula is midline and soft palate rises symmetrically  XI: head turning and shoulder shrug are intact.  XII: tongue midline  Motor: Normal bulk and tone, strength 5/5 in b/l UE and LE,  strength 5/5. No pronator drift.  Sensation: intact to light touch and pinprick. No neglect. Romberg negative.  Coordination: No dysmetria on finger-to-nose and heel-to-shin  Reflexes: 2+ in upper and lower extremities, downgoing toes bilaterally  Gait: narrow and steady, no ataxia. Able to perform tandem and heel to toe walking.        LABS:                        11.8   6.59  )-----------( 148      ( 2020 04:26 )             35.8     02-    145  |  114<H>  |  10  ----------------------------<  111<H>  4.1   |  19<L>  |  1.09    Ca    9.3      2020 19:25  Phos  1.5     02-02  Mg     2.0     02-    TPro  4.7<L>  /  Alb  2.8<L>  /  TBili  0.9  /  DBili  x   /  AST  23  /  ALT  11  /  AlkPhos  45  02-02      Urinalysis Basic - ( 2020 16:40 )    Color: Yellow / Appearance: Clear / S.010 / pH: x  Gluc: x / Ketone: NEGATIVE  / Bili: Negative / Urobili: 0.2 E.U./dL   Blood: x / Protein: Trace mg/dL / Nitrite: NEGATIVE   Leuk Esterase: NEGATIVE / RBC: < 5 /HPF / WBC < 5 /HPF   Sq Epi: x / Non Sq Epi: 0-5 /HPF / Bacteria: None /HPF        RADIOLOGY & ADDITIONAL TESTS:    EEG report 2020  Daily Summary:    Moderate to severe generalized slowing with overriding beta activity, improved compared to the prior recording as reactivity, variability, and states changes are now present.  No epileptiform activity and no significant clinical events occurred.    No new cerebral imaging results since     Assessment and Plan  63 y/o male with PMHx of HTN, CVA (residual dysarthria and left-hand weakness), and anxiety disorder who presented to [place] this morning with worsening dysarthria. Patient was found to have acute R MCA stroke with perfusion defect on CT scan at [place], transferred to [facility] for further management. CTH/CTA/CTP on arrival showed R sided perfusion defect consistent with subacute R MCA infarct. 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 Patient was brought to the MICU but after procedural sedation began to wear off, appeared to be contracted with intermittent left leg jerking. Second SC called, patient intubated, and repeat CTH remained unchanged. vEEG placed O/N read on  which revealed generalized slowing with overriding beta activity, likely due to sedating medications without epileptiform activity. No epileptiform activity on vEEG .      1)Secondary stroke prevention  -Continue ASA 81mg PO daily and Plavix 75mg PO daily  -Continue Atorvastatin 80mg PO daily    2) Stroke risk factors  -HTN, on amlodipine 5mg daily and cardene drip   -Previous CVA (baseline dysarthria and left-hand weakness)    3) Further workup   - SBP goal < 180s   - MRI brain noncontrast  - repeat CTH on 2/3  - Continue to follow up with EEG results, continue with Keppra 500mg BID 	  - ECHO/LOC  - PT/OT	    4)DVT prophylaxis   -Lovenox SQ  and SCDs Neurology Stroke Progress Note    INTERVAL HPI/OVERNIGHT EVENTS:  Patient seen and examined. Pt now extubated, examined off sedation. Pt's cousin at bedside, recommended using patient's nickname "Meg." Pt responds to nickname. Unable to obtain ROS due to limited speech.     MEDICATIONS  (STANDING):  amLODIPine   Tablet 5 milliGRAM(s) Oral daily  aspirin enteric coated 81 milliGRAM(s) Oral daily  atorvastatin 80 milliGRAM(s) Oral at bedtime  chlorhexidine 2% Cloths 1 Application(s) Topical <User Schedule>  clopidogrel Tablet 75 milliGRAM(s) Oral daily  dexMEDEtomidine Infusion 0.194 MICROgram(s)/kG/Hr (4 mL/Hr) IV Continuous <Continuous>  enoxaparin Injectable 40 milliGRAM(s) SubCutaneous every 24 hours  levETIRAcetam  IVPB 500 milliGRAM(s) IV Intermittent every 12 hours  niCARdipine Infusion 5 mG/Hr (25 mL/Hr) IV Continuous <Continuous>  sodium chloride 0.9%. 1000 milliLiter(s) (75 mL/Hr) IV Continuous <Continuous>    MEDICATIONS  (PRN):  acetaminophen    Suspension .. 650 milliGRAM(s) Oral every 8 hours PRN Temp greater or equal to 38C (100.4F)    Allergies  Allergy Status Unknown      ROS: As per HPI, otherwise negative    Vital Signs Last 24 Hrs  T(C): 37.9 (2020 19:00), Max: 37.9 (2020 19:00)  T(F): 100.2 (2020 19:00), Max: 100.2 (2020 19:00)  HR: 90 (2020 20:00) (77 - 120)  BP: 163/96 (2020 08:30) (163/96 - 163/96)  BP(mean): 123 (2020 08:30) (123 - 123)  RR: 14 (2020 20:00) (5 - 32)  SpO2: 96% (2020 20:00) (93% - 100%)    Physical exam:  General: Awake and alert, lying in bed, no acute distress.  CV: RRR, no murmurs  Pulm: CTA bilaterally  Neurologic:  Mental status: Awake and alert. Opens eyes to nickname "meg," follows simple commands.  Cranial nerves:   II: +Blink to threat bilaterally.    III, IV, VI: Able to track with eyes, can cross midline, but does not bury to right. Prefers left side.   VII: No facial droop, facie is symmetric with normal eye closure and smile.   IX, X: Uvula is midline and soft palate rises symmetrically.  Motor: Moving all extremities spontaneously, but no movement of left arm. Right lower extremity 3/5, left lower extremity with minimal movement 2/5.   Sensation: Withdraws to pain in all extremities. No neglect.  Coordination: Unable to assess.   Gait: Deferred.     LABS:                        11.8   6.59  )-----------( 148      ( 2020 04:26 )             35.8     02-    145  |  114<H>  |  10  ----------------------------<  111<H>  4.1   |  19<L>  |  1.09    Ca    9.3      2020 19:25  Phos  1.5     02-  Mg     2.0         TPro  4.7<L>  /  Alb  2.8<L>  /  TBili  0.9  /  DBili  x   /  AST  23  /  ALT  11  /  AlkPhos  45  02-      Urinalysis Basic - ( 2020 16:40 )    Color: Yellow / Appearance: Clear / S.010 / pH: x  Gluc: x / Ketone: NEGATIVE  / Bili: Negative / Urobili: 0.2 E.U./dL   Blood: x / Protein: Trace mg/dL / Nitrite: NEGATIVE   Leuk Esterase: NEGATIVE / RBC: < 5 /HPF / WBC < 5 /HPF   Sq Epi: x / Non Sq Epi: 0-5 /HPF / Bacteria: None /HPF      RADIOLOGY & ADDITIONAL TESTS:  - EEG report 2020  Daily Summary:    Moderate to severe generalized slowing with overriding beta activity, improved compared to the prior recording as reactivity, variability, and states changes are now present.  No epileptiform activity and no significant clinical events occurred.    No new cerebral imaging results since     Assessment and Plan  63 y/o male with PMHx of HTN, CVA (residual dysarthria and left-hand weakness), and anxiety disorder who presented with an acute R MCA stroke with perfusion defect on CT scan at [place], transferred to [facility] for further management. CTH/CTA/CTP on arrival showed R sided perfusion defect consistent with subacute R MCA infarct. 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 Patient was brought to the MICU but after procedural sedation began to wear off, appeared to be contracted with intermittent left leg jerking. Second SC called, patient intubated, and repeat CTH remained unchanged. vEEG placed O/N, negative without epileptiform activity 2/2. Patient extubated 2/2, tolerating well and appears more awake but agitated. Neuro/stroke will continue to follow, pending repeat CTH tomorrow 2/3.       1)Secondary stroke prevention  - Continue ASA 81mg PO daily and Plavix 75mg PO daily  - Continue Atorvastatin 80mg PO daily    2) Stroke risk factors  -HTN, on amlodipine 5mg daily and cardene drip   -Previous CVA (baseline dysarthria and left-hand weakness)    3) Further workup   - SBP goal < 180s   - MRI brain noncontrast  - Repeat CTH 2/3  - ECHO/LOC  - PT/OT	    4)DVT prophylaxis   -Lovenox SQ  and SCDs

## 2020-02-02 NOTE — EEG REPORT - NS EEG TEXT BOX
Continuous EEG Report  2/1/2020 @ 7 AM to 2/2/2020 @ 7 AM    Pertinent medications: Propofol, Precedex    Background:  continuous, with predominantly delta with overriding beta frequencies.  Symmetry:  No persistent asymmetries of voltage or frequency.  Posterior Dominant Rhythm:  No clear PDR   Organization: Rudimentary.  Voltage:  Low (most <20uV LBP Peak-Trough)  Variability: Yes. 						  Reactivity: Yes.  N2 sleep: State changes were seen without definite N2 sleep transients.  Spontaneous Activity:  No epileptiform discharges.  Periodic/rhythmic activity:  None.  Events:  No electrographic seizures or significant clinical events.  Provocations:  Hyperventilation and Photic stimulation: was not performed.    Daily Summary:    Moderate to severe generalized slowing with overriding beta activity, improved compared to the prior recording as reactivity, variability, and states changes are now present.  No epileptiform activity and no significant clinical events occurred.    Read by:  Ashly Marquis MD

## 2020-02-02 NOTE — DIETITIAN INITIAL EVALUATION ADULT. - ADD RECOMMEND
If unable to extubate w/in 48hrs, consider initiation of EN: recommend Jevity 1.2 (route per MD) starting at 10ml/hr and increasing as tolerated by pt towards goal rate of 65ml/hr x 24hrs providing 1560ml TV, 1872kcal, 86gms protein, 1258ml water (23kcal/kg and 1.0gms pro/kg based on ABW of 82.3kg). Monitor s/s intolerance, maintain aspiration precautions at all times. Additional water per MD discretion.

## 2020-02-02 NOTE — DIETITIAN INITIAL EVALUATION ADULT. - PERTINENT MEDS FT
NaCl 0.9% @75ml/hr, nicardipine, klor-con, potassium phosphate, Tylenol suspension, propofol, precedex

## 2020-02-02 NOTE — PROGRESS NOTE ADULT - ASSESSMENT
65 y/o M with PMHx of HTN and CVA (2 months ago) transferred from [facility] s/p stroke (left facial and eyelid drooling and speech slurring presenting for possible thrombectomy, as patient outside window for tPA - c/b reintubation following thrombectomy, now with concern for seizure    HEMODYNAMICS:  Patient euvolemic on exam - intubated, unable to assess mental status, making urine, extremities warm and well perfused.     NEURO  # r/o Stroke vs Seizure  Unclear etiology of events. CTH negative at [place] and [facility] (stroke code x2). s/p aborted thrombectomy with evidence of chronic ischemia. Repeat CT with evidence of contrast extravasation but no acute ischemic or hemorrhagic event. Patient with notable tremors and facial grimaces consistent with seizures.  - vEEG negative for seizures  - c/w Keppra 500mg BID  - minimize ativan and sedation for appropriate EEG evaluation  - epilepsy following, appreciate recs  - stroke team following appreciate recs      #Intubated  Patient s/p emergent intubation for airway protection. Patient intubated and sedated.  - CPAP/SBT trial in AM - attempt to extubate  - wean off sedation as tolerated for appropriate EEG reading    #Hx of CVA  Per medical records patient with CVA, 2 months ago.  - no evidence of acute stroke  - prophylactic measure to be initiated when upon extubation: statin therapy, ASA, plavix, control of hypertension     CARDIO  #Hypertension  Patient with reported history of hypertension. Unclear home medications. BP on arrival SBP >200, however currently normotensive (possibly secondary to sedation).   - obtain collateral   - SBP goal 120-150    RESP: REYES  GI: REYES  : REYES  RENAL: REYES  ID: REYES      PROPHYLACTIC MEASURE  F: N/A  E: replete to K>4, Mg>2  N: NONE    VTE Prophylaxis: Lovenox SubQ  C: FULL  D: MICU

## 2020-02-02 NOTE — PROGRESS NOTE ADULT - SUBJECTIVE AND OBJECTIVE BOX
**Incomplete Note** OVERNIGHT EVENTS: No acute events overnight,    SUBJECTIVE / INTERVAL HPI: Patient seen and examined at bedside - intubated and sedated.    INTERVAL EVENTS: Successfully extubated; cardene gt initiated; repeat CTH ordered per neuo to rule out stroke; EEG without evidnece of epilptiform activity; home amolodopine 5mg initiated; pt in postictal state; pt with increased UOP, concern for post-obstructive diuresis; self correcting Na in setting of hypernatremia    VITAL SIGNS:  Vital Signs Last 24 Hrs  T(C): 37.9 (2020 19:00), Max: 37.9 (2020 19:00)  T(F): 100.2 (:00), Max: 100.2 (:00)  HR: 76 (:00) (76 - 120)  BP: 117/67 (2020 23:00) (108/62 - 163/96)  BP(mean): 87 (:00) (76 - 123)  RR: 21 (2020 23:00) (5 - 32)  SpO2: 100% (:00) (93% - 100%)    PHYSICAL EXAM:    General: elderly male intubated and sedated  HEENT: vEEG in place ; PERRL, anicteric sclera; ET tube in place   Neck: supple; no JVD  Cardiovascular: +S1/S2; RRR  Respiratory: CTA B/L; no W/R/R  Gastrointestinal: soft, NT/ND; +BSx4  Extremities: WWP; no edema, clubbing or cyanosis  Vascular: 2+ radial, DP/PT pulses B/L  Neurological: intubated and sedaed      MEDICATIONS:  MEDICATIONS  (STANDING):  amLODIPine   Tablet 5 milliGRAM(s) Oral daily  aspirin enteric coated 81 milliGRAM(s) Oral daily  atorvastatin 80 milliGRAM(s) Oral at bedtime  chlorhexidine 2% Cloths 1 Application(s) Topical <User Schedule>  clonazePAM  Tablet 1 milliGRAM(s) Oral every 12 hours  clopidogrel Tablet 75 milliGRAM(s) Oral daily  dexMEDEtomidine Infusion 0.194 MICROgram(s)/kG/Hr (4 mL/Hr) IV Continuous <Continuous>  enoxaparin Injectable 40 milliGRAM(s) SubCutaneous every 24 hours  levETIRAcetam  IVPB 500 milliGRAM(s) IV Intermittent every 12 hours  niCARdipine Infusion 5 mG/Hr (25 mL/Hr) IV Continuous <Continuous>  sodium chloride 0.9%. 1000 milliLiter(s) (75 mL/Hr) IV Continuous <Continuous>    MEDICATIONS  (PRN):  acetaminophen    Suspension .. 650 milliGRAM(s) Oral every 8 hours PRN Temp greater or equal to 38C (100.4F)  LORazepam   Injectable 2 milliGRAM(s) IV Push every 4 hours PRN Agitation      ALLERGIES:  Allergies    Allergy Status Unknown    Intolerances        LABS:                        11.8   6.59  )-----------( 148      ( 2020 04:26 )             35.8     02-02    145  |  114<H>  |  10  ----------------------------<  111<H>  4.1   |  19<L>  |  1.09    Ca    9.3      2020 19:25  Phos  1.5     02-02  Mg     2.0     02-    TPro  4.7<L>  /  Alb  2.8<L>  /  TBili  0.9  /  DBili  x   /  AST  23  /  ALT  11  /  AlkPhos  45  02-02      Urinalysis Basic - ( 2020 16:40 )    Color: Yellow / Appearance: Clear / S.010 / pH: x  Gluc: x / Ketone: NEGATIVE  / Bili: Negative / Urobili: 0.2 E.U./dL   Blood: x / Protein: Trace mg/dL / Nitrite: NEGATIVE   Leuk Esterase: NEGATIVE / RBC: < 5 /HPF / WBC < 5 /HPF   Sq Epi: x / Non Sq Epi: 0-5 /HPF / Bacteria: None /HPF      CAPILLARY BLOOD GLUCOSE          RADIOLOGY & ADDITIONAL TESTS: Reviewed.    ASSESSMENT:    PLAN:

## 2020-02-02 NOTE — DIETITIAN INITIAL EVALUATION ADULT. - OTHER INFO
Pt seen for initial assessment. 65 y/o M with PMHx of HTN and CVA (2 months ago) transferred from [facility] s/p stroke (left facial and eyelid drooling and speech slurring) presenting for possible thrombectomy, as patient outside window for tPA. 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 Patient was brought to the MICU but after procedural sedation began to wear off, appeared to be contracted with intermittent left leg jerking. Second SC called, patient intubated, and repeat CTH remained unchanged. S/P gastric intubation/lavage 2/1. Now with concern for seizure, w/ veeg monitoring. Skin: 2+ edema b/l hands and arms 2/1, +surgical incision, no pressure injury. Pt seen in bed, intubated (VC-AC mode), sedated, Nicardipine @12.5, propofol @4.9, precedex @16.5, MAP 98. Currently NPO, unable to assess PO PTA and/or wt hx. NKFA per EMR. No apparent GI distress, although last BM unknown. RD to follow up per protocol.

## 2020-02-03 LAB
ANION GAP SERPL CALC-SCNC: 10 MMOL/L — SIGNIFICANT CHANGE UP (ref 5–17)
ANION GAP SERPL CALC-SCNC: 11 MMOL/L — SIGNIFICANT CHANGE UP (ref 5–17)
APPEARANCE UR: CLEAR — SIGNIFICANT CHANGE UP
BILIRUB UR-MCNC: NEGATIVE — SIGNIFICANT CHANGE UP
BUN SERPL-MCNC: 11 MG/DL — SIGNIFICANT CHANGE UP (ref 7–23)
BUN SERPL-MCNC: 11 MG/DL — SIGNIFICANT CHANGE UP (ref 7–23)
CALCIUM SERPL-MCNC: 8.5 MG/DL — SIGNIFICANT CHANGE UP (ref 8.4–10.5)
CALCIUM SERPL-MCNC: 8.9 MG/DL — SIGNIFICANT CHANGE UP (ref 8.4–10.5)
CHLORIDE SERPL-SCNC: 112 MMOL/L — HIGH (ref 96–108)
CHLORIDE SERPL-SCNC: 112 MMOL/L — HIGH (ref 96–108)
CO2 SERPL-SCNC: 20 MMOL/L — LOW (ref 22–31)
CO2 SERPL-SCNC: 20 MMOL/L — LOW (ref 22–31)
COLOR SPEC: YELLOW — SIGNIFICANT CHANGE UP
CREAT SERPL-MCNC: 0.93 MG/DL — SIGNIFICANT CHANGE UP (ref 0.5–1.3)
CREAT SERPL-MCNC: 1.1 MG/DL — SIGNIFICANT CHANGE UP (ref 0.5–1.3)
DIFF PNL FLD: ABNORMAL
GLUCOSE SERPL-MCNC: 112 MG/DL — HIGH (ref 70–99)
GLUCOSE SERPL-MCNC: 113 MG/DL — HIGH (ref 70–99)
GLUCOSE UR QL: NEGATIVE — SIGNIFICANT CHANGE UP
HCT VFR BLD CALC: 40.4 % — SIGNIFICANT CHANGE UP (ref 39–50)
HGB BLD-MCNC: 13.7 G/DL — SIGNIFICANT CHANGE UP (ref 13–17)
KETONES UR-MCNC: 15 MG/DL
LEUKOCYTE ESTERASE UR-ACNC: NEGATIVE — SIGNIFICANT CHANGE UP
MAGNESIUM SERPL-MCNC: 2.2 MG/DL — SIGNIFICANT CHANGE UP (ref 1.6–2.6)
MCHC RBC-ENTMCNC: 32.3 PG — SIGNIFICANT CHANGE UP (ref 27–34)
MCHC RBC-ENTMCNC: 33.9 GM/DL — SIGNIFICANT CHANGE UP (ref 32–36)
MCV RBC AUTO: 95.3 FL — SIGNIFICANT CHANGE UP (ref 80–100)
NITRITE UR-MCNC: NEGATIVE — SIGNIFICANT CHANGE UP
NRBC # BLD: 0 /100 WBCS — SIGNIFICANT CHANGE UP (ref 0–0)
PH UR: 6 — SIGNIFICANT CHANGE UP (ref 5–8)
PHOSPHATE SERPL-MCNC: 2.5 MG/DL — SIGNIFICANT CHANGE UP (ref 2.5–4.5)
PLATELET # BLD AUTO: 142 K/UL — LOW (ref 150–400)
POTASSIUM SERPL-MCNC: 4.1 MMOL/L — SIGNIFICANT CHANGE UP (ref 3.5–5.3)
POTASSIUM SERPL-MCNC: 4.3 MMOL/L — SIGNIFICANT CHANGE UP (ref 3.5–5.3)
POTASSIUM SERPL-SCNC: 4.1 MMOL/L — SIGNIFICANT CHANGE UP (ref 3.5–5.3)
POTASSIUM SERPL-SCNC: 4.3 MMOL/L — SIGNIFICANT CHANGE UP (ref 3.5–5.3)
PROT UR-MCNC: ABNORMAL MG/DL
RBC # BLD: 4.24 M/UL — SIGNIFICANT CHANGE UP (ref 4.2–5.8)
RBC # FLD: 13 % — SIGNIFICANT CHANGE UP (ref 10.3–14.5)
SODIUM SERPL-SCNC: 142 MMOL/L — SIGNIFICANT CHANGE UP (ref 135–145)
SODIUM SERPL-SCNC: 143 MMOL/L — SIGNIFICANT CHANGE UP (ref 135–145)
SP GR SPEC: 1.01 — SIGNIFICANT CHANGE UP (ref 1–1.03)
UROBILINOGEN FLD QL: 1 E.U./DL — SIGNIFICANT CHANGE UP
WBC # BLD: 6.99 K/UL — SIGNIFICANT CHANGE UP (ref 3.8–10.5)
WBC # FLD AUTO: 6.99 K/UL — SIGNIFICANT CHANGE UP (ref 3.8–10.5)

## 2020-02-03 PROCEDURE — 99233 SBSQ HOSP IP/OBS HIGH 50: CPT

## 2020-02-03 PROCEDURE — 99233 SBSQ HOSP IP/OBS HIGH 50: CPT | Mod: GC

## 2020-02-03 PROCEDURE — 93306 TTE W/DOPPLER COMPLETE: CPT | Mod: 26

## 2020-02-03 PROCEDURE — 71045 X-RAY EXAM CHEST 1 VIEW: CPT | Mod: 26

## 2020-02-03 RX ORDER — ASPIRIN/CALCIUM CARB/MAGNESIUM 324 MG
81 TABLET ORAL DAILY
Refills: 0 | Status: DISCONTINUED | OUTPATIENT
Start: 2020-02-03 | End: 2020-02-10

## 2020-02-03 RX ORDER — QUETIAPINE FUMARATE 200 MG/1
12.5 TABLET, FILM COATED ORAL EVERY 12 HOURS
Refills: 0 | Status: DISCONTINUED | OUTPATIENT
Start: 2020-02-03 | End: 2020-02-04

## 2020-02-03 RX ORDER — LABETALOL HCL 100 MG
10 TABLET ORAL ONCE
Refills: 0 | Status: COMPLETED | OUTPATIENT
Start: 2020-02-03 | End: 2020-02-03

## 2020-02-03 RX ORDER — DOXAZOSIN MESYLATE 4 MG
1 TABLET ORAL AT BEDTIME
Refills: 0 | Status: DISCONTINUED | OUTPATIENT
Start: 2020-02-03 | End: 2020-02-04

## 2020-02-03 RX ORDER — NICARDIPINE HYDROCHLORIDE 30 MG/1
5 CAPSULE, EXTENDED RELEASE ORAL
Qty: 40 | Refills: 0 | Status: DISCONTINUED | OUTPATIENT
Start: 2020-02-03 | End: 2020-02-05

## 2020-02-03 RX ADMIN — Medication 1.25 MILLIGRAM(S): at 18:10

## 2020-02-03 RX ADMIN — Medication 10 MILLIGRAM(S): at 17:45

## 2020-02-03 RX ADMIN — Medication 81 MILLIGRAM(S): at 12:24

## 2020-02-03 RX ADMIN — Medication 10 MILLIGRAM(S): at 18:10

## 2020-02-03 RX ADMIN — AMLODIPINE BESYLATE 5 MILLIGRAM(S): 2.5 TABLET ORAL at 05:43

## 2020-02-03 RX ADMIN — DEXMEDETOMIDINE HYDROCHLORIDE IN 0.9% SODIUM CHLORIDE 4 MICROGRAM(S)/KG/HR: 4 INJECTION INTRAVENOUS at 05:43

## 2020-02-03 RX ADMIN — ENOXAPARIN SODIUM 40 MILLIGRAM(S): 100 INJECTION SUBCUTANEOUS at 18:50

## 2020-02-03 RX ADMIN — CHLORHEXIDINE GLUCONATE 1 APPLICATION(S): 213 SOLUTION TOPICAL at 05:43

## 2020-02-03 RX ADMIN — CLOPIDOGREL BISULFATE 75 MILLIGRAM(S): 75 TABLET, FILM COATED ORAL at 12:25

## 2020-02-03 RX ADMIN — QUETIAPINE FUMARATE 12.5 MILLIGRAM(S): 200 TABLET, FILM COATED ORAL at 18:49

## 2020-02-03 RX ADMIN — Medication 650 MILLIGRAM(S): at 18:49

## 2020-02-03 RX ADMIN — LEVETIRACETAM 400 MILLIGRAM(S): 250 TABLET, FILM COATED ORAL at 10:20

## 2020-02-03 RX ADMIN — ATORVASTATIN CALCIUM 80 MILLIGRAM(S): 80 TABLET, FILM COATED ORAL at 21:02

## 2020-02-03 RX ADMIN — QUETIAPINE FUMARATE 12.5 MILLIGRAM(S): 200 TABLET, FILM COATED ORAL at 12:23

## 2020-02-03 RX ADMIN — LEVETIRACETAM 400 MILLIGRAM(S): 250 TABLET, FILM COATED ORAL at 21:02

## 2020-02-03 RX ADMIN — Medication 1 MILLIGRAM(S): at 21:02

## 2020-02-03 RX ADMIN — SODIUM CHLORIDE 75 MILLILITER(S): 9 INJECTION INTRAMUSCULAR; INTRAVENOUS; SUBCUTANEOUS at 14:28

## 2020-02-03 RX ADMIN — NICARDIPINE HYDROCHLORIDE 25 MG/HR: 30 CAPSULE, EXTENDED RELEASE ORAL at 18:51

## 2020-02-03 RX ADMIN — Medication 2 MILLIGRAM(S): at 03:18

## 2020-02-03 RX ADMIN — Medication 650 MILLIGRAM(S): at 20:16

## 2020-02-03 NOTE — PROGRESS NOTE ADULT - SUBJECTIVE AND OBJECTIVE BOX
Neurology Stroke Progress Note  *************INCOMPLETE*****************  INTERVAL HPI/OVERNIGHT EVENTS:  Patient seen and examined.     MEDICATIONS  (STANDING):  amLODIPine   Tablet 5 milliGRAM(s) Oral daily  aspirin  chewable 81 milliGRAM(s) Oral daily  atorvastatin 80 milliGRAM(s) Oral at bedtime  chlorhexidine 2% Cloths 1 Application(s) Topical <User Schedule>  clopidogrel Tablet 75 milliGRAM(s) Oral daily  doxazosin 1 milliGRAM(s) Oral at bedtime  enoxaparin Injectable 40 milliGRAM(s) SubCutaneous every 24 hours  levETIRAcetam  IVPB 500 milliGRAM(s) IV Intermittent every 12 hours  QUEtiapine 12.5 milliGRAM(s) Oral every 12 hours  sodium chloride 0.9%. 1000 milliLiter(s) (75 mL/Hr) IV Continuous <Continuous>    MEDICATIONS  (PRN):  acetaminophen    Suspension .. 650 milliGRAM(s) Oral every 8 hours PRN Temp greater or equal to 38C (100.4F)      Allergies    Allergy Status Unknown    Intolerances      Vital Signs Last 24 Hrs  T(C): 38.1 (2020 14:00), Max: 38.1 (2020 14:00)  T(F): 100.6 (2020 14:00), Max: 100.6 (2020 14:00)  HR: 104 (2020 14:15) (47 - 121)  BP: 177/89 (2020 13:00) (108/62 - 198/106)  BP(mean): 122 (2020 13:00) (76 - 139)  RR: 21 (2020 14:15) (11 - 32)  SpO2: 94% (2020 14:15) (93% - 100%)    Physical exam:  ***********INCOMPLETE********  General: Awake and alert, lying in bed, no acute distress.  CV: RRR, no murmurs  Pulm: CTA bilaterally  Neurologic:  Mental status: Awake and alert. Opens eyes to nickname "meg," follows simple commands.  Cranial nerves:   II: +Blink to threat bilaterally.    III, IV, VI: Able to track with eyes, can cross midline, but does not bury to right. Prefers left side.   VII: No facial droop, facie is symmetric with normal eye closure and smile.   IX, X: Uvula is midline and soft palate rises symmetrically.  Motor: Moving all extremities spontaneously, but no movement of left arm. Right lower extremity 3/5, left lower extremity with minimal movement 2/5.   Sensation: Withdraws to pain in all extremities. No neglect.  Coordination: Unable to assess.   Gait: Deferred.     LABS:                        13.7   6.99  )-----------( 142      ( 2020 05:35 )             40.4     02-03    143  |  112<H>  |  11  ----------------------------<  112<H>  4.3   |  20<L>  |  0.93    Ca    8.9      2020 05:35  Phos  2.5     02-03  Mg     2.2     02-03    TPro  4.7<L>  /  Alb  2.8<L>  /  TBili  0.9  /  DBili  x   /  AST  23  /  ALT  11  /  AlkPhos  45  02-02      Urinalysis Basic - ( 2020 16:40 )    Color: Yellow / Appearance: Clear / S.010 / pH: x  Gluc: x / Ketone: NEGATIVE  / Bili: Negative / Urobili: 0.2 E.U./dL   Blood: x / Protein: Trace mg/dL / Nitrite: NEGATIVE   Leuk Esterase: NEGATIVE / RBC: < 5 /HPF / WBC < 5 /HPF   Sq Epi: x / Non Sq Epi: 0-5 /HPF / Bacteria: None /HPF        RADIOLOGY & ADDITIONAL TESTS:      Assessment and Plan  63 y/o male with PMHx of HTN, CVA (residual dysarthria and left-hand weakness), and anxiety disorder who presented with an acute R MCA stroke with perfusion defect on CT scan at [place], transferred to [facility] for further management. CTH/CTA/CTP on arrival showed R sided perfusion defect consistent with subacute R MCA infarct. 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 Patient was brought to the MICU but after procedural sedation began to wear off, appeared to be contracted with intermittent left leg jerking. Second SC called, patient intubated, and repeat CTH remained unchanged. vEEG placed O/N, negative without epileptiform activity 2/. Patient extubated /2, tolerating well and appears more awake but agitated. **********************INCOMPLETE******************     1)Secondary stroke prevention  - Continue ASA 81mg PO daily and Plavix 75mg PO daily  - Continue Atorvastatin 80mg PO daily    2) Stroke risk factors  -HTN, on amlodipine 5mg daily and cardene drip   -Previous CVA (baseline dysarthria and left-hand weakness)    3) Further workup   - SBP goal < 180s   - MRI brain noncontrast  - Repeat CTH 2/3  - ECHO/LOC  - PT/OT	    4)DVT prophylaxis   -Lovenox SQ  and SCDs        Marisa Xie  Neurology Stroke NP  [phone] Neurology Stroke Progress Note    INTERVAL HPI/OVERNIGHT EVENTS:  Patient seen and examined. Unarousable to voice and noxious.     MEDICATIONS  (STANDING):  amLODIPine   Tablet 5 milliGRAM(s) Oral daily  aspirin  chewable 81 milliGRAM(s) Oral daily  atorvastatin 80 milliGRAM(s) Oral at bedtime  chlorhexidine 2% Cloths 1 Application(s) Topical <User Schedule>  clopidogrel Tablet 75 milliGRAM(s) Oral daily  doxazosin 1 milliGRAM(s) Oral at bedtime  enoxaparin Injectable 40 milliGRAM(s) SubCutaneous every 24 hours  levETIRAcetam  IVPB 500 milliGRAM(s) IV Intermittent every 12 hours  QUEtiapine 12.5 milliGRAM(s) Oral every 12 hours  sodium chloride 0.9%. 1000 milliLiter(s) (75 mL/Hr) IV Continuous <Continuous>    MEDICATIONS  (PRN):  acetaminophen    Suspension .. 650 milliGRAM(s) Oral every 8 hours PRN Temp greater or equal to 38C (100.4F)      Allergies  Status unknown    Vital Signs Last 24 Hrs  T(C): 38.1 (2020 14:00), Max: 38.1 (2020 14:00)  T(F): 100.6 (2020 14:00), Max: 100.6 (2020 14:00)  HR: 104 (2020 14:15) (47 - 121)  BP: 177/89 (2020 13:00) (108/62 - 198/106)  BP(mean): 122 (2020 13:00) (76 - 139)  RR: 21 (2020 14:15) (11 - 32)  SpO2: 94% (2020 14:15) (93% - 100%)    Physical exam:  General: Lying in bed, no acute distress.    Neurologic:  Mental status: Lethargic, not arousable to voice or noxious stimuli. Does not open eyes to nickname "meg," not following any commands.  Cranial nerves:   II: +Blink to threat bilaterally. PERRLA bilaterally.    III, IV, VI: Gaze at midline.  VII: No facial droop, face symmetric with normal eye closure and smile.   Motor: Moving all extremities spontaneously, but no movement of left arm. Right lower extremity 3/5, left lower extremity with minimal movement 2/5.   Sensation: Withdraws to pain in all extremities, but left arm contracts to pain. No neglect.  Coordination: Unable to assess.   Reflexes: L upgoing toe, R equivocal.   Gait: Deferred.     LABS:                        13.7   6.99  )-----------( 142      ( 2020 05:35 )             40.4     02-03    143  |  112<H>  |  11  ----------------------------<  112<H>  4.3   |  20<L>  |  0.93    Ca    8.9      2020 05:35  Phos  2.5     02-03  Mg     2.2     02-03    TPro  4.7<L>  /  Alb  2.8<L>  /  TBili  0.9  /  DBili  x   /  AST  23  /  ALT  11  /  AlkPhos  45  02-02      Urinalysis Basic - ( 2020 16:40 )    Color: Yellow / Appearance: Clear / S.010 / pH: x  Gluc: x / Ketone: NEGATIVE  / Bili: Negative / Urobili: 0.2 E.U./dL   Blood: x / Protein: Trace mg/dL / Nitrite: NEGATIVE   Leuk Esterase: NEGATIVE / RBC: < 5 /HPF / WBC < 5 /HPF   Sq Epi: x / Non Sq Epi: 0-5 /HPF / Bacteria: None /HPF      RADIOLOGY & ADDITIONAL TESTS:  - No new cerebral imaging, last CTH     Assessment and Plan  65 y/o male with PMHx of HTN, CVA (residual dysarthria and left-hand weakness), and anxiety disorder who presented with an acute R MCA stroke with perfusion defect on CT scan s/p attempted mechanical thrombectomy (aborted due to 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 Patient was brought to the MICU but after procedural sedation began to wear off, appeared to be contracted with intermittent left leg jerking. Second SC called, patient intubated, and repeat CTH remained unchanged. vEEG negative without epileptiform activity and patient extubated 2/2, tolerating well. Neuro/stroke will continue to follow, pending MRI brain.     1)Secondary stroke prevention  - Continue ASA 81mg PO daily and Plavix 75mg PO daily  - Continue Atorvastatin 80mg PO daily    2) Stroke risk factors  -HTN, on amlodipine 5mg daily and cardene drip   -Previous CVA (baseline dysarthria and left-hand weakness)    3) Further workup   - SBP goal < 180s   - MRI brain noncontrast  - LOC  - PT/OT	  - SLP     4)DVT prophylaxis   -Continue Lovenox SQ  and SCDs    Marisa Xie  Neurology Stroke NP  [phone]

## 2020-02-03 NOTE — PROGRESS NOTE ADULT - SUBJECTIVE AND OBJECTIVE BOX
**Incomplete Note** OVERNIGHT EVENTS:    SUBJECTIVE / INTERVAL HPI: Patient seen and examined at bedside.     VITAL SIGNS:  Vital Signs Last 24 Hrs  T(C): 35.7 (2020 01:04), Max: 37.9 (2020 19:00)  T(F): 96.2 (2020 01:04), Max: 100.2 (2020 19:00)  HR: 51 (2020 06:00) (48 - 120)  BP: 166/88 (2020 06:00) (108/62 - 166/88)  BP(mean): 120 (2020 06:00) (76 - 123)  RR: 13 (2020 06:00) (5 - 32)  SpO2: 99% (2020 06:00) (93% - 100%)    PHYSICAL EXAM:    General: WDWN  HEENT: NC/AT; PERRL, anicteric sclera; MMM  Neck: supple  Cardiovascular: +S1/S2; RRR  Respiratory: CTA B/L; no W/R/R  Gastrointestinal: soft, NT/ND; +BSx4  Extremities: WWP; no edema, clubbing or cyanosis  Vascular: 2+ radial, DP/PT pulses B/L  Neurological: AAOx3; no focal deficits    MEDICATIONS:  MEDICATIONS  (STANDING):  amLODIPine   Tablet 5 milliGRAM(s) Oral daily  aspirin enteric coated 81 milliGRAM(s) Oral daily  atorvastatin 80 milliGRAM(s) Oral at bedtime  chlorhexidine 2% Cloths 1 Application(s) Topical <User Schedule>  clonazePAM  Tablet 1 milliGRAM(s) Oral every 12 hours  clopidogrel Tablet 75 milliGRAM(s) Oral daily  dexMEDEtomidine Infusion 0.194 MICROgram(s)/kG/Hr (4 mL/Hr) IV Continuous <Continuous>  enoxaparin Injectable 40 milliGRAM(s) SubCutaneous every 24 hours  levETIRAcetam  IVPB 500 milliGRAM(s) IV Intermittent every 12 hours  sodium chloride 0.9%. 1000 milliLiter(s) (75 mL/Hr) IV Continuous <Continuous>    MEDICATIONS  (PRN):  acetaminophen    Suspension .. 650 milliGRAM(s) Oral every 8 hours PRN Temp greater or equal to 38C (100.4F)  LORazepam   Injectable 2 milliGRAM(s) IV Push every 4 hours PRN Agitation      ALLERGIES:  Allergies    Allergy Status Unknown    Intolerances        LABS:                        13.7   6.99  )-----------( 142      ( 2020 05:35 )             40.4     02-03    143  |  112<H>  |  11  ----------------------------<  112<H>  4.3   |  20<L>  |  0.93    Ca    8.9      2020 05:35  Phos  2.5     02-03  Mg     2.2     02-03    TPro  4.7<L>  /  Alb  2.8<L>  /  TBili  0.9  /  DBili  x   /  AST  23  /  ALT  11  /  AlkPhos  45  02-02      Urinalysis Basic - ( 2020 16:40 )    Color: Yellow / Appearance: Clear / S.010 / pH: x  Gluc: x / Ketone: NEGATIVE  / Bili: Negative / Urobili: 0.2 E.U./dL   Blood: x / Protein: Trace mg/dL / Nitrite: NEGATIVE   Leuk Esterase: NEGATIVE / RBC: < 5 /HPF / WBC < 5 /HPF   Sq Epi: x / Non Sq Epi: 0-5 /HPF / Bacteria: None /HPF      CAPILLARY BLOOD GLUCOSE          RADIOLOGY & ADDITIONAL TESTS: Reviewed.    ASSESSMENT:    PLAN: OVERNIGHT EVENTS: Agitated overnight, received ativan 2mg. Cardene gtt initiated temporarily for hypertension, with addition of Klonopin 1mg BID for BP control.     SUBJECTIVE / INTERVAL HPI: Patient seen and examined at bedside this AM, not arousalable to deep sternal rub - unable to obtain ROS.     VITAL SIGNS:  Vital Signs Last 24 Hrs  T(C): 35.7 (2020 01:04), Max: 37.9 (2020 19:00)  T(F): 96.2 (2020 01:04), Max: 100.2 (2020 19:00)  HR: 51 (2020 06:00) (48 - 120)  BP: 166/88 (2020 06:00) (108/62 - 166/88)  BP(mean): 120 (2020 06:00) (76 - 123)  RR: 13 (2020 06:00) (5 - 32)  SpO2: 99% (2020 06:00) (93% - 100%)    PHYSICAL EXAM:    General: elderly  male resting in bed  HEENT: NC/AT; PERRL, anicteric sclera  Neck: supple; no JVD;   Cardiovascular: +S1/S2; RRR  Respiratory: CTA B/L; no W/R/R  Gastrointestinal: overly nourished; soft, NT/ND; +BSx4  Extremities: WWP; no edema, clubbing or cyanosis  Vascular: 2+ radial, DP/PT pulses B/L  Neurological: AAOx0, unresponsive to verbal or noxious stimuli     MEDICATIONS:  MEDICATIONS  (STANDING):  amLODIPine   Tablet 5 milliGRAM(s) Oral daily  aspirin enteric coated 81 milliGRAM(s) Oral daily  atorvastatin 80 milliGRAM(s) Oral at bedtime  chlorhexidine 2% Cloths 1 Application(s) Topical <User Schedule>  clonazePAM  Tablet 1 milliGRAM(s) Oral every 12 hours  clopidogrel Tablet 75 milliGRAM(s) Oral daily  dexMEDEtomidine Infusion 0.194 MICROgram(s)/kG/Hr (4 mL/Hr) IV Continuous <Continuous>  enoxaparin Injectable 40 milliGRAM(s) SubCutaneous every 24 hours  levETIRAcetam  IVPB 500 milliGRAM(s) IV Intermittent every 12 hours  sodium chloride 0.9%. 1000 milliLiter(s) (75 mL/Hr) IV Continuous <Continuous>    MEDICATIONS  (PRN):  acetaminophen    Suspension .. 650 milliGRAM(s) Oral every 8 hours PRN Temp greater or equal to 38C (100.4F)  LORazepam   Injectable 2 milliGRAM(s) IV Push every 4 hours PRN Agitation      ALLERGIES:  Allergies    Allergy Status Unknown    Intolerances        LABS:                        13.7   6.99  )-----------( 142      ( 2020 05:35 )             40.4     02-03    143  |  112<H>  |  11  ----------------------------<  112<H>  4.3   |  20<L>  |  0.93    Ca    8.9      2020 05:35  Phos  2.5     02-03  Mg     2.2     02-03    TPro  4.7<L>  /  Alb  2.8<L>  /  TBili  0.9  /  DBili  x   /  AST  23  /  ALT  11  /  AlkPhos  45  02-02      Urinalysis Basic - ( 2020 16:40 )    Color: Yellow / Appearance: Clear / S.010 / pH: x  Gluc: x / Ketone: NEGATIVE  / Bili: Negative / Urobili: 0.2 E.U./dL   Blood: x / Protein: Trace mg/dL / Nitrite: NEGATIVE   Leuk Esterase: NEGATIVE / RBC: < 5 /HPF / WBC < 5 /HPF   Sq Epi: x / Non Sq Epi: 0-5 /HPF / Bacteria: None /HPF      CAPILLARY BLOOD GLUCOSE          RADIOLOGY & ADDITIONAL TESTS: Reviewed.    ASSESSMENT:    PLAN:

## 2020-02-03 NOTE — PROGRESS NOTE ADULT - ASSESSMENT
65 y/o M with PMHx of HTN and CVA (2 months ago) transferred from [facility] s/p stroke (left facial and eyelid drooling and speech slurring presenting for possible thrombectomy, as patient outside window for tPA - c/b reintubation following thrombectomy, now with concern for seizure    HEMODYNAMICS:  Patient euvolemic on exam - intubated, unable to assess mental status, making urine, extremities warm and well perfused.     NEURO  # r/o Stroke vs Seizure  Unclear etiology of events. CTH negative at [facility] and [facility] (stroke code x2). s/p aborted thrombectomy with evidence of chronic ischemia. Repeat CT with evidence of contrast extravasation but no acute ischemic or hemorrhagic event. Patient with notable tremors and facial grimaces consistent with seizures.  - vEEG negative for seizures  - c/w Keppra 500mg BID  - minimize ativan and sedation for appropriate EEG evaluation  - epilepsy following, appreciate recs  - stroke team following appreciate recs      #Intubated  Patient s/p emergent intubation for airway protection. Patient intubated and sedated.  - CPAP/SBT trial in AM - attempt to extubate  - wean off sedation as tolerated for appropriate EEG reading    #Hx of CVA  Per medical records patient with CVA, 2 months ago.  - no evidence of acute stroke  - prophylactic measure to be initiated when upon extubation: statin therapy, ASA, plavix, control of hypertension     CARDIO  #Hypertension  Patient with reported history of hypertension. Unclear home medications. BP on arrival SBP >200, however currently normotensive (possibly secondary to sedation).   - obtain collateral   - SBP goal 120-150    RESP: REYES  GI: REYES  : REYES  RENAL: REYES  ID: REYES      PROPHYLACTIC MEASURE  F: N/A  E: replete to K>4, Mg>2  N: NONE    VTE Prophylaxis: Lovenox SubQ  C: FULL  D: MICU 65 y/o M with PMHx of HTN and CVA (2 months ago) transferred from [facility] s/p stroke (left facial and eyelid drooling and speech slurring presenting for possible thrombectomy, as patient outside window for tPA - c/b reintubation following thrombectomy, now with concern for seizure    HEMODYNAMICS:  Patient euvolemic on exam - intubated, unable to assess mental status, making urine, extremities warm and well perfused, appropriate urine output.    NEURO  #Encephalopathy 2/2 stroke vs seizure  Unclear etiology of events. CTH negative at [place] and [facility] (stroke code x2). s/p aborted thrombectomy with evidence of chronic ischemia. Repeat CT with evidence of contrast extravasation but no acute ischemic or hemorrhagic event. Patient with notable tremors and facial grimaces consistent with seizures.  - vEEG negative for seizures -> per neuro likely status epilepticus  - c/w Keppra 500mg BID  - minimize ativan and sedation  - epilepsy following, appreciate recs  - stroke team following appreciate recs  -f/u MRI     #Post-Ictal state  Patient minimally responsive, concern for delirium vs postictal state  - titrate down precedex  - Seroquel 12.5mg BID   - continue to monitor     #Intubated RESOLVED  Patient s/p emergent intubation for airway protection. Patient intubated and sedated.  - CPAP/SBT trial in AM - attempt to extubate  - wean off sedation as tolerated for appropriate EEG reading  - Patient successfully extubated 2/2/20, saturating well on RA    #Hx of CVA  Per medical records patient with CVA, 2 months ago.  - no evidence of acute stroke  - prophylactic measure to be initiated when upon extubation: statin therapy, ASA, plavix, control of hypertension     CARDIO  #Hypertension  Patient with reported history of hypertension. Unclear home medications. BP on arrival SBP >200, however currently normotensive (possibly secondary to sedation).   - obtain collateral   - SBP goal 120-150    RESP: REYES  GI: REYES  : REYES  RENAL: REYES  ID: REYES      PROPHYLACTIC MEASURE  F: N/A  E: replete to K>4, Mg>2  N: NONE    VTE Prophylaxis: Lovenox SubQ  C: FULL  D: MICU

## 2020-02-04 LAB
ANION GAP SERPL CALC-SCNC: 14 MMOL/L — SIGNIFICANT CHANGE UP (ref 5–17)
BUN SERPL-MCNC: 14 MG/DL — SIGNIFICANT CHANGE UP (ref 7–23)
CALCIUM SERPL-MCNC: 8.9 MG/DL — SIGNIFICANT CHANGE UP (ref 8.4–10.5)
CHLORIDE SERPL-SCNC: 112 MMOL/L — HIGH (ref 96–108)
CO2 SERPL-SCNC: 19 MMOL/L — LOW (ref 22–31)
CREAT SERPL-MCNC: 1.17 MG/DL — SIGNIFICANT CHANGE UP (ref 0.5–1.3)
GLUCOSE SERPL-MCNC: 110 MG/DL — HIGH (ref 70–99)
HCT VFR BLD CALC: 41 % — SIGNIFICANT CHANGE UP (ref 39–50)
HGB BLD-MCNC: 14.3 G/DL — SIGNIFICANT CHANGE UP (ref 13–17)
MAGNESIUM SERPL-MCNC: 1.9 MG/DL — SIGNIFICANT CHANGE UP (ref 1.6–2.6)
MCHC RBC-ENTMCNC: 32.4 PG — SIGNIFICANT CHANGE UP (ref 27–34)
MCHC RBC-ENTMCNC: 34.9 GM/DL — SIGNIFICANT CHANGE UP (ref 32–36)
MCV RBC AUTO: 92.8 FL — SIGNIFICANT CHANGE UP (ref 80–100)
NRBC # BLD: 0 /100 WBCS — SIGNIFICANT CHANGE UP (ref 0–0)
PHOSPHATE SERPL-MCNC: 2.3 MG/DL — LOW (ref 2.5–4.5)
PLATELET # BLD AUTO: 171 K/UL — SIGNIFICANT CHANGE UP (ref 150–400)
POTASSIUM SERPL-MCNC: 3.8 MMOL/L — SIGNIFICANT CHANGE UP (ref 3.5–5.3)
POTASSIUM SERPL-SCNC: 3.8 MMOL/L — SIGNIFICANT CHANGE UP (ref 3.5–5.3)
RBC # BLD: 4.42 M/UL — SIGNIFICANT CHANGE UP (ref 4.2–5.8)
RBC # FLD: 12.9 % — SIGNIFICANT CHANGE UP (ref 10.3–14.5)
SODIUM SERPL-SCNC: 145 MMOL/L — SIGNIFICANT CHANGE UP (ref 135–145)
WBC # BLD: 6.34 K/UL — SIGNIFICANT CHANGE UP (ref 3.8–10.5)
WBC # FLD AUTO: 6.34 K/UL — SIGNIFICANT CHANGE UP (ref 3.8–10.5)

## 2020-02-04 PROCEDURE — 99233 SBSQ HOSP IP/OBS HIGH 50: CPT | Mod: GC

## 2020-02-04 PROCEDURE — 99232 SBSQ HOSP IP/OBS MODERATE 35: CPT

## 2020-02-04 PROCEDURE — 70551 MRI BRAIN STEM W/O DYE: CPT | Mod: 26

## 2020-02-04 RX ORDER — DOXAZOSIN MESYLATE 4 MG
2 TABLET ORAL AT BEDTIME
Refills: 0 | Status: DISCONTINUED | OUTPATIENT
Start: 2020-02-04 | End: 2020-02-05

## 2020-02-04 RX ORDER — QUETIAPINE FUMARATE 200 MG/1
12.5 TABLET, FILM COATED ORAL AT BEDTIME
Refills: 0 | Status: DISCONTINUED | OUTPATIENT
Start: 2020-02-04 | End: 2020-02-05

## 2020-02-04 RX ORDER — CARVEDILOL PHOSPHATE 80 MG/1
6.25 CAPSULE, EXTENDED RELEASE ORAL EVERY 12 HOURS
Refills: 0 | Status: DISCONTINUED | OUTPATIENT
Start: 2020-02-04 | End: 2020-02-09

## 2020-02-04 RX ORDER — AMLODIPINE BESYLATE 2.5 MG/1
10 TABLET ORAL EVERY 24 HOURS
Refills: 0 | Status: DISCONTINUED | OUTPATIENT
Start: 2020-02-05 | End: 2020-02-09

## 2020-02-04 RX ORDER — AMLODIPINE BESYLATE 2.5 MG/1
5 TABLET ORAL ONCE
Refills: 0 | Status: COMPLETED | OUTPATIENT
Start: 2020-02-04 | End: 2020-02-04

## 2020-02-04 RX ORDER — POTASSIUM PHOSPHATE, MONOBASIC POTASSIUM PHOSPHATE, DIBASIC 236; 224 MG/ML; MG/ML
15 INJECTION, SOLUTION INTRAVENOUS ONCE
Refills: 0 | Status: COMPLETED | OUTPATIENT
Start: 2020-02-04 | End: 2020-02-04

## 2020-02-04 RX ADMIN — QUETIAPINE FUMARATE 12.5 MILLIGRAM(S): 200 TABLET, FILM COATED ORAL at 05:02

## 2020-02-04 RX ADMIN — AMLODIPINE BESYLATE 5 MILLIGRAM(S): 2.5 TABLET ORAL at 14:39

## 2020-02-04 RX ADMIN — LEVETIRACETAM 400 MILLIGRAM(S): 250 TABLET, FILM COATED ORAL at 09:59

## 2020-02-04 RX ADMIN — CARVEDILOL PHOSPHATE 6.25 MILLIGRAM(S): 80 CAPSULE, EXTENDED RELEASE ORAL at 11:32

## 2020-02-04 RX ADMIN — ATORVASTATIN CALCIUM 80 MILLIGRAM(S): 80 TABLET, FILM COATED ORAL at 21:44

## 2020-02-04 RX ADMIN — Medication 81 MILLIGRAM(S): at 11:29

## 2020-02-04 RX ADMIN — POTASSIUM PHOSPHATE, MONOBASIC POTASSIUM PHOSPHATE, DIBASIC 63.75 MILLIMOLE(S): 236; 224 INJECTION, SOLUTION INTRAVENOUS at 06:49

## 2020-02-04 RX ADMIN — Medication 2 MILLIGRAM(S): at 21:44

## 2020-02-04 RX ADMIN — ENOXAPARIN SODIUM 40 MILLIGRAM(S): 100 INJECTION SUBCUTANEOUS at 21:44

## 2020-02-04 RX ADMIN — AMLODIPINE BESYLATE 5 MILLIGRAM(S): 2.5 TABLET ORAL at 05:02

## 2020-02-04 RX ADMIN — QUETIAPINE FUMARATE 12.5 MILLIGRAM(S): 200 TABLET, FILM COATED ORAL at 21:44

## 2020-02-04 RX ADMIN — Medication 10 MILLIGRAM(S): at 17:39

## 2020-02-04 RX ADMIN — CARVEDILOL PHOSPHATE 6.25 MILLIGRAM(S): 80 CAPSULE, EXTENDED RELEASE ORAL at 21:44

## 2020-02-04 RX ADMIN — CLOPIDOGREL BISULFATE 75 MILLIGRAM(S): 75 TABLET, FILM COATED ORAL at 11:29

## 2020-02-04 RX ADMIN — LEVETIRACETAM 400 MILLIGRAM(S): 250 TABLET, FILM COATED ORAL at 21:44

## 2020-02-04 RX ADMIN — CHLORHEXIDINE GLUCONATE 1 APPLICATION(S): 213 SOLUTION TOPICAL at 05:03

## 2020-02-04 RX ADMIN — NICARDIPINE HYDROCHLORIDE 25 MG/HR: 30 CAPSULE, EXTENDED RELEASE ORAL at 14:39

## 2020-02-04 NOTE — OCCUPATIONAL THERAPY INITIAL EVALUATION ADULT - PLANNED THERAPY INTERVENTIONS, OT EVAL
ADL retraining/IADL retraining/balance training/bed mobility training/cognitive, visual perceptual/fine motor coordination training/motor coordination training/ROM/strengthening/stretching/transfer training

## 2020-02-04 NOTE — PHYSICAL THERAPY INITIAL EVALUATION ADULT - MANUAL MUSCLE TESTING RESULTS, REHAB EVAL
R UE/LE grossly >3/5 based on spontaneous non-purposeful movements, L UE/LE ~1/5 t/o trace muscle activation

## 2020-02-04 NOTE — OCCUPATIONAL THERAPY INITIAL EVALUATION ADULT - PHYSICAL ASSIST/NONPHYSICAL ASSIST: SCOOT/BRIDGE, REHAB EVAL
Patient Education & Instructions:     -Take antibiotic as prescribed.    -Avoid touching and rubbing eyes, wash hands frequently.    -Do not wear contact lenses and throw away old lenses and storage cases.    -Avoid using eye makeup and throw away eye makeup after symptoms resolve.    -Wash pillowcases and replace them frequently.    -Use warm compress to affected eye for 10-15 minutes every 3-4 hours.    -Use warm compresses to help sooth the eyes and remove eyelash debris.    -Avoid known allergic triggers.    -Bacterial conjunctivitis is contagious for 24-48 hours after starting medication.    -Viral conjunctivitis is contagious until the discharge has resolved.     Go to the ER if you develop: eye pain, light sensitivity or vision changes!!!    Follow up with PCP or ophthalmologist if no improvement or as needed.    If you need a doctor or specialist, call [phone] ([phone]).    Thank you for choosing [facility]!    -Keep ears dry when in shower for the next 7 days    -Follow up with pediatrician in 2-3 days for re-evaluation of left ear.     Patient Education     Nonspecific Conjunctivitis (Child)  The conjunctiva is a thin membrane that covers the eye and the inside of the eyelids. It can become irritated. If no reason for this inflammation is found, it is called nonspecific conjunctivitis.  When the conjunctiva becomes inflamed, the eye looks red. Small blood vessels are visible up close. The eye may have a clear or white, cloudy discharge. The eyelids may be swollen and red. There may be morning crusting around the eye. Most likely, the conjunctivitis was caused by a brief irritation. The irritated eye is treated with a soothing nonprescription ointment or eye drops.  Home care    Medicines  The healthcare provider may prescribe medicine to ease eye irritation. Follow the healthcare provider’s instructions for giving this medicine to your child.  · Wash your hands well with soap  and warm water before and after caring for your child’s eye.  · It is common for discharge to form crusts around the eye. Gently wipe crusts away with a wet swab or a clean, warm, damp washcloth. Wipe toward the ear. This is to keep the eye as clean as possible.  · Try to prevent your child from rubbing the eye.  To apply ointment or eye drops:  1. Have your child lie down on his or her back.  2. Using eye drops: Apply drops in the corner of the eye, where the eyelid meets the nose. The drops will pool in this area. When your child blinks or opens his or her lids, the drops will flow into the eye. Give the exact number of drops prescribed. Be careful not to touch the eye or eyelashes with the dropper.  3. Using ointment: If both drops and ointment are prescribed, give the drops first. Wait 3 minutes, and then apply the ointment. Doing this will give each medicine time to work. To apply the ointment, start by gently pulling down the lower lid. Place a thin line of ointment along the inside of the lid. Begin at the nose and move outward. Close the lid. Wipe away excess medicine from the nose outward. This is to keep the eye as clean as possible. Have your child keep the eye closed for 1 or 2 minutes so the medicine has time to coat the eye. Eye ointment may cause blurry vision. This is normal. Apply ointment right before your child goes to sleep. In infants, the ointment may be easier to apply while your child is sleeping.  4. Wipe away excess medicine with a clean cloth.  Follow-up care  Follow up with your child’s healthcare provider, or as advised.  When to seek medical advice  For a usually healthy child, call the healthcare provider right away if any of these occur:  · Your child has a fever (see Fever and children section, below)  · Your child has increasing or continuing symptoms.  · Your child has vision problems (not related to ointment use).  · Your child shows signs of infection such as increased redness or  swelling, worsening pain, or foul-smelling drainage from the eye.  Call 911  Call 911 or local emergency services right away if any of these occur:  · Your child has trouble breathing  · Your child shows confusion  · Your child is very drowsy or has trouble waking up  · Your child faints or loses consciousness  · Your child has a rapid heart rate  · Your child has a seizure  · Your child has a stiff neck  Fever and children  Always use a digital thermometer to check your child’s temperature. Never use a mercury thermometer.  For infants and toddlers, be sure to use a rectal thermometer correctly. A rectal thermometer may accidentally poke a hole in (perforate) the rectum. It may also pass on germs from the stool. Always follow the product maker’s directions for proper use. If you don’t feel comfortable taking a rectal temperature, use another method. When you talk to your child’s healthcare provider, tell him or her which method you used to take your child’s temperature.  Here are guidelines for fever temperature. Ear temperatures aren’t accurate before 6 months of age. Don’t take an oral temperature until your child is at least 4 years old.  Infant under 3 months old:  · Ask your child’s healthcare provider how you should take the temperature.  · Rectal or forehead temperature of 100.4°F (38°C) or higher, or as directed by the provider.  · Armpit temperature of 99°F (37.2°C) or higher, or as directed by the provider.  Child age 3 to 36 months:  · Rectal, forehead, or ear temperature of 102°F (38.9°C) or higher, or as directed by the provider.  · Armpit temperature of 101°F (38.3°C) or higher, or as directed by the provider.  Child of any age:  · Repeated temperature of 104°F (40°C) or higher, or as directed by the provider.  · Fever that lasts more than 24 hours in a child under 2 years old. Or a fever that lasts for 3 days in a child 2 years or older.   Date Last Reviewed: 3/1/2018  © 5943-3682 [employer]. [address] [place] [address]. All rights reserved. This information is not intended as a substitute for professional medical care. Always follow your healthcare professional's instructions.           Patient Education     Earwax (Treated)    Everyone produces earwax from the lining of the ear canal. It lubricates and protects the ear. The wax that forms in the canal slowly moves toward the outside of the ear and falls out. Sometimes wax can build up in the ear canal. This can cause a blockage and loss of hearing. A buildup of earwax was removed from your ear today.  Home care  If you have a tendency to build up wax in the ear canal, you should clear the wax at home regularly, before it causes discomfort. This should be about once every six months.  · Unless a medicine was prescribed, you may use an over-the-counter product made for clearing earwax. These contain carbamide peroxide and are available over-the-counter in a kit with a small bulb syringe.  · Lie down with the blocked ear facing upward. Apply one dropper full of medicine and wait a few minutes. Grasp the outer ear and wiggle it to help the solution enter the canal.  · Lean over a sink or basin with the blocked ear turned downward. Use a rubber bulb syringe filled with warm (not hot or cold) water to rinse the ear several times. Use gentle pressure only. You may need to repeat the irrigation several times before the wax flows out.  · If you are having trouble draining all the water out of your ear canal, put a few drops of rubbing alcohol into the ear canal. This will help remove the remaining water.  Don'ts  · Don’t use cold water to rinse the ear. This will make you dizzy.  · Don’t perform this procedure if you have an ear infection (ear pain, fever, or fluid draining from the ear).  · Don’t perform this procedure if you have a punctured eardrum.  · Don’t use cotton swabs, matches, hairpins, keys, or other objects to “clean”  the ear canal. This can cause infection of the ear canal or rupture of the eardrum. Because of their size and shape, cotton swabs can push the earwax deeper into the ear canal instead of removing it.  Follow-up care  Follow up with your health care provider, or as advised.  When to seek medical advice  Call your health care provider right away if any of these occur:  · Worsening ear pain  · Fever of 101°F (38.3°C) or higher, or as directed by your health care provider  · Hearing does not return to normal after three days of treatment  · Fluid drainage or bleeding from the ear canal  · Swelling, redness, or tenderness of the outer ear  · Headache, neck pain, or stiff neck  © 1139-6645 [employer]. [address] [place] [address], [place] [address]. All rights reserved. This information is not intended as a substitute for professional medical care. Always follow your healthcare professional's instructions.           Patient Education       External Ear Infection (Child)  Your child has an infection in the ear canal. This problem is also known as external otitis, otitis externa, or “swimmer’s ear.” It is usually caused by bacteria or fungus. It can occur if water is trapped in the ear canal (from swimming or bathing). Putting cotton swabs or other objects in the ear can also damage the skin in the ear canal and make this problem more likely.  Your child may have pain, itching, redness, drainage, or swelling of the ear canal. He or she may also have temporary hearing loss. In most cases, symptoms resolve within a week.  Home care  Follow these guidelines when caring for your child at home:  · Don’t try to clean the ear canal. This may push pus and bacteria deeper into the canal.  · Use prescribed eardrops as directed. These help reduce swelling and fight the infection. If an ear wick was placed in the ear canal, apply drops right onto the end of the wick. The wick will draw the medicine into the ear canal even if  it is swollen closed.  · A cotton ball may be loosely placed in the outer ear to absorb any drainage.  · Don’t allow water to get into your child’s ear when he or she bathing. Also, don’t allow your child to go swimming for at least 7 to10 days after starting treatment.  · You may give your child acetaminophen to control pain, unless another pain medicine was prescribed. In children older than 6 months, you may use ibuprofen instead of acetaminophen. If your child has chronic liver or kidney disease, talk with the provider before using these medicines. Also talk with the provider if your child has had a stomach ulcer or gastrointestinal bleeding. Don’t give aspirin to a child younger than 18 years old who is ill with a fever. It may cause severe liver damage.  Prevention  · Don’t clean the inside of your child’s ears. Also, caution your child not to stick objects inside his or her ears.  · Have your child wear earplugs when swimming.  · After exiting water, have your child turn his or her head to the side to drain any excess water from the ears. Ears should be dried well with a towel. A hair dryer may be used to dry the ears, but it needs to be on a low or cool setting and about 12 inches away from the ears.  · If your child feels water trapped in the ears, use ear drops right away. You can get these drops over the counter at most drugstores. They work by removing water from the ear canal.  Follow-up care  Follow up with your child’s healthcare provider, or as directed.  When to seek medical advice  Call your child's provider right away if any of these occur:  · Fever (see Fever and children, below)  · Symptoms worsen or do not get better after 3 days of treatment  · New symptoms appear  · Outer ear becomes red, warm, or swollen     Fever and children  Always use a digital thermometer to check your child’s temperature. Never use a mercury thermometer.  For infants and toddlers, be sure to use a rectal thermometer  correctly. A rectal thermometer may accidentally poke a hole in (perforate) the rectum. It may also pass on germs from the stool. Always follow the product maker’s directions for proper use. If you don’t feel comfortable taking a rectal temperature, use another method. When you talk to your child’s healthcare provider, tell him or her which method you used to take your child’s temperature.  Here are guidelines for fever temperature. Ear temperatures aren’t accurate before 6 months of age. Don’t take an oral temperature until your child is at least 4 years old.  Infant under 3 months old:  · Ask your child’s healthcare provider how you should take the temperature.  · Rectal or forehead (temporal artery) temperature of 100.4°F (38°C) or higher, or as directed by the provider  · Armpit temperature of 99°F (37.2°C) or higher, or as directed by the provider  Child age 3 to 36 months:  · Rectal, forehead (temporal artery), or ear temperature of 102°F (38.9°C) or higher, or as directed by the provider  · Armpit temperature of 101°F (38.3°C) or higher, or as directed by the provider  Child of any age:  · Repeated temperature of 104°F (40°C) or higher, or as directed by the provider  · Fever that lasts more than 24 hours in a child under 2 years old. Or a fever that lasts for 3 days in a child 2 years or older.      Date Last Reviewed: 6/2/2017  © 1298-4439 [employer]. [address] [place] [address], [place] [address]. All rights reserved. This information is not intended as a substitute for professional medical care. Always follow your healthcare professional's instructions.            verbal cues/nonverbal cues (demo/gestures)/2 person assist

## 2020-02-04 NOTE — PHYSICAL THERAPY INITIAL EVALUATION ADULT - IMPAIRMENTS CONTRIBUTING IMPAIRED BED MOBILITY, REHAB EVAL
impaired balance/cognition/impaired coordination/impaired motor control/abnormal muscle tone/impaired postural control/decreased strength

## 2020-02-04 NOTE — OCCUPATIONAL THERAPY INITIAL EVALUATION ADULT - PERTINENT HX OF CURRENT PROBLEM, REHAB EVAL
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 vEEG negative. Further workup needed, patient's symptoms could be new stroke vs metabolic encephalopathy vs seizures.

## 2020-02-04 NOTE — PROGRESS NOTE ADULT - SUBJECTIVE AND OBJECTIVE BOX
**Incomplete Note** OVERNIGHT EVENTS:    SUBJECTIVE / INTERVAL HPI: Patient seen and examined at bedside.     VITAL SIGNS:  Vital Signs Last 24 Hrs  T(C): 37.5 (2020 14:56), Max: 38.3 (2020 18:00)  T(F): 99.5 (2020 14:56), Max: 101 (2020 18:00)  HR: 97 (2020 16:00) (90 - 115)  BP: 161/78 (2020 08:35) (129/77 - 202/106)  BP(mean): 111 (2020 08:35) (94 - 145)  RR: 31 (2020 16:00) (20 - 33)  SpO2: 93% (2020 16:00) (93% - 97%)    PHYSICAL EXAM:    General: WDWN  HEENT: NC/AT; PERRL, anicteric sclera; MMM  Neck: supple  Cardiovascular: +S1/S2; RRR  Respiratory: CTA B/L; no W/R/R  Gastrointestinal: soft, NT/ND; +BSx4  Extremities: WWP; no edema, clubbing or cyanosis  Vascular: 2+ radial, DP/PT pulses B/L  Neurological: AAOx3; no focal deficits    MEDICATIONS:  MEDICATIONS  (STANDING):  aspirin  chewable 81 milliGRAM(s) Oral daily  atorvastatin 80 milliGRAM(s) Oral at bedtime  carvedilol 6.25 milliGRAM(s) Oral every 12 hours  chlorhexidine 2% Cloths 1 Application(s) Topical <User Schedule>  clopidogrel Tablet 75 milliGRAM(s) Oral daily  doxazosin 2 milliGRAM(s) Oral at bedtime  enalapril 10 milliGRAM(s) Oral every 24 hours  enoxaparin Injectable 40 milliGRAM(s) SubCutaneous every 24 hours  levETIRAcetam  IVPB 500 milliGRAM(s) IV Intermittent every 12 hours  niCARdipine Infusion 5 mG/Hr (25 mL/Hr) IV Continuous <Continuous>  QUEtiapine 12.5 milliGRAM(s) Oral at bedtime    MEDICATIONS  (PRN):  acetaminophen    Suspension .. 650 milliGRAM(s) Oral every 8 hours PRN Temp greater or equal to 38C (100.4F)      ALLERGIES:  Allergies    Allergy Status Unknown    Intolerances        LABS:                        14.3   6.34  )-----------( 171      ( 2020 04:24 )             41.0     02-04    145  |  112<H>  |  14  ----------------------------<  110<H>  3.8   |  19<L>  |  1.17    Ca    8.9      2020 04:24  Phos  2.3     02-04  Mg     1.9     02-04        Urinalysis Basic - ( 2020 21:00 )    Color: Yellow / Appearance: Clear / S.015 / pH: x  Gluc: x / Ketone: 15 mg/dL  / Bili: Negative / Urobili: 1.0 E.U./dL   Blood: x / Protein: Trace mg/dL / Nitrite: NEGATIVE   Leuk Esterase: NEGATIVE / RBC: 5-10 /HPF / WBC < 5 /HPF   Sq Epi: x / Non Sq Epi: 0-5 /HPF / Bacteria: Present /HPF      CAPILLARY BLOOD GLUCOSE          RADIOLOGY & ADDITIONAL TESTS: Reviewed.    ASSESSMENT:    PLAN: HOSPITAL COURSE: 63 y/o M with PMHx of dementia, HTN and CVA (2 months ago) transferred from [facility] s/p possible stroke (left facial and eyelid drooling and speech slurring transferred for thrombectomy as patient outside window of tPA. CTH/brain and perfusion imaging negative for stroke on admission. Patient s/p aborted thrombectomy, due to likely chronic strokes, as posterior collaterals noted on angiogram. Patient requiring emergent re-intubation following thrombectomy for airway protection and concern for possible stroke in setting of left extremity tremor. Repeat stroke workup negative, with concern for seizure. Patient keppra loaded, with initiation of Keppra 500mg BID. vEEG without evidence of seizure activity, however will continue Keppra given repeated noted left sided tremor. Patient successfully extubated, however. non-arousable, somnolent and lethargic - concern for stroke vs seizure activity with resultant metabolic encephalopathy. Hospital course complicated by peristent hypertension - requiring nicardene gtt. Home amlodipine 5mg increased to 10mg, with initiation of enalapril and coreg.   Active Issues: MRI brain (r/o stroke), HTN, and LOC (as part of stroke work up).      OVERNIGHT EVENTS: Patient titrated off nicardine gtt overnight, however increasingly HTN to SBP 180s-190s, requiring re-initiation.     SUBJECTIVE / INTERVAL HPI: Patient seen and examined at bedside this - letahargic and unarousable, unable to obtain ROS.     VITAL SIGNS:  Vital Signs Last 24 Hrs  T(C): 37.5 (2020 14:56), Max: 38.3 (2020 18:00)  T(F): 99.5 (2020 14:56), Max: 101 (2020 18:00)  HR: 97 (2020 16:00) (90 - 115)  BP: 161/78 (2020 08:35) (129/77 - 202/106)  BP(mean): 111 (2020 08:35) (94 - 145)  RR: 31 (2020 16:00) (20 - 33)  SpO2: 93% (2020 16:00) (93% - 97%)    PHYSICAL EXAM:    General:  male resting in bed; somnolent and lethargic, unarousable to sternal rub or noxious stimuli   HEENT: NC/AT; PERRL, anicteric sclera  Neck: supple; no JVD; no cervical or submandibular lymphadenopathy   Cardiovascular: +S1/S2; RRR  Respiratory: CTA B/L; no W/R/R  Gastrointestinal: overly nourished; soft, NT/ND; +BSx4  Extremities: WWP; no edema, clubbing or cyanosis  Vascular: 2+ radial, DP/PT pulses B/L  Neurological: AAOx0; notably agitated, but not moving left lower extremity     MEDICATIONS:  MEDICATIONS  (STANDING):  aspirin  chewable 81 milliGRAM(s) Oral daily  atorvastatin 80 milliGRAM(s) Oral at bedtime  carvedilol 6.25 milliGRAM(s) Oral every 12 hours  chlorhexidine 2% Cloths 1 Application(s) Topical <User Schedule>  clopidogrel Tablet 75 milliGRAM(s) Oral daily  doxazosin 2 milliGRAM(s) Oral at bedtime  enalapril 10 milliGRAM(s) Oral every 24 hours  enoxaparin Injectable 40 milliGRAM(s) SubCutaneous every 24 hours  levETIRAcetam  IVPB 500 milliGRAM(s) IV Intermittent every 12 hours  niCARdipine Infusion 5 mG/Hr (25 mL/Hr) IV Continuous <Continuous>  QUEtiapine 12.5 milliGRAM(s) Oral at bedtime    MEDICATIONS  (PRN):  acetaminophen    Suspension .. 650 milliGRAM(s) Oral every 8 hours PRN Temp greater or equal to 38C (100.4F)      ALLERGIES:  Allergies    Allergy Status Unknown    Intolerances        LABS:                        14.3   6.34  )-----------( 171      ( 2020 04:24 )             41.0     02-04    145  |  112<H>  |  14  ----------------------------<  110<H>  3.8   |  19<L>  |  1.17    Ca    8.9      2020 04:24  Phos  2.3     02-04  Mg     1.9     02-04        Urinalysis Basic - ( 2020 21:00 )    Color: Yellow / Appearance: Clear / S.015 / pH: x  Gluc: x / Ketone: 15 mg/dL  / Bili: Negative / Urobili: 1.0 E.U./dL   Blood: x / Protein: Trace mg/dL / Nitrite: NEGATIVE   Leuk Esterase: NEGATIVE / RBC: 5-10 /HPF / WBC < 5 /HPF   Sq Epi: x / Non Sq Epi: 0-5 /HPF / Bacteria: Present /HPF      CAPILLARY BLOOD GLUCOSE          RADIOLOGY & ADDITIONAL TESTS: Reviewed.    ASSESSMENT:    PLAN:

## 2020-02-04 NOTE — PROGRESS NOTE ADULT - ASSESSMENT
63 y/o M with PMHx of HTN and CVA (2 months ago) transferred from [facility] s/p stroke (left facial and eyelid drooling and speech slurring presenting for possible thrombectomy, as patient outside window for tPA - c/b reintubation following thrombectomy, now with concern for seizure    HEMODYNAMICS:  Patient euvolemic on exam - intubated, unable to assess mental status, making urine, extremities warm and well perfused, appropriate urine output.    NEURO  #Encephalopathy 2/2 stroke vs seizure  Unclear etiology of events. CTH negative at [place] and [facility] (stroke code x2). s/p aborted thrombectomy with evidence of chronic ischemia. Repeat CT with evidence of contrast extravasation but no acute ischemic or hemorrhagic event. Patient with notable tremors and facial grimaces consistent with seizures.  - vEEG negative for seizures -> per neuro likely status epilepticus  - c/w Keppra 500mg BID  - minimize ativan and sedation  - epilepsy following, appreciate recs  - stroke team following appreciate recs  -f/u MRI     #Post-Ictal state  Patient minimally responsive, concern for delirium vs postictal state  - titrate down precedex  - Seroquel 12.5mg BID   - continue to monitor     #Intubated RESOLVED  Patient s/p emergent intubation for airway protection. Patient intubated and sedated.  - CPAP/SBT trial in AM - attempt to extubate  - wean off sedation as tolerated for appropriate EEG reading  - Patient successfully extubated 2/2/20, saturating well on RA    #Hx of CVA  Per medical records patient with CVA, 2 months ago.  - no evidence of acute stroke  - prophylactic measure to be initiated when upon extubation: statin therapy, ASA, plavix, control of hypertension     CARDIO  #Hypertension  Patient with reported history of hypertension. Unclear home medications. BP on arrival SBP >200, however currently normotensive (possibly secondary to sedation).   - obtain collateral   - SBP goal 120-150    RESP: REYES  GI: REYES  : REYES  RENAL: REYES  ID: REYES      PROPHYLACTIC MEASURE  F: N/A  E: replete to K>4, Mg>2  N: NONE    VTE Prophylaxis: Lovenox SubQ  C: FULL  D: MICU 65 y/o M with PMHx of HTN and CVA (2 months ago) transferred from [facility] s/p stroke (left facial and eyelid drooling and speech slurring presenting for possible thrombectomy, as patient outside window for tPA - c/b reintubation following thrombectomy, now with concern for seizure    HEMODYNAMICS:  Patient euvolemic on exam - intubated, unable to assess mental status, making urine, extremities warm and well perfused, appropriate urine output.    NEURO  #Encephalopathy 2/2 stroke vs seizure  Unclear etiology of events. CTH negative at [place] and [facility] (stroke code x2). s/p aborted thrombectomy with evidence of chronic ischemia. Repeat CT with evidence of contrast extravasation but no acute ischemic or hemorrhagic event. Patient with notable tremors and facial grimaces consistent with seizures.  - vEEG negative for seizures -> per neuro likely status epilepticus  - c/w Keppra 500mg BID  - minimize ativan and sedation  - epilepsy following, appreciate recs  - stroke team following appreciate recs  -f/u MRI   - Seroquel 12.5mg at night    #Post-Ictal state  Patient minimally responsive, concern for delirium vs postictal state  - titrate down precedex  - continue to monitor     #Intubated RESOLVED  Patient s/p emergent intubation for airway protection. Patient intubated and sedated.  - CPAP/SBT trial in AM - attempt to extubate  - wean off sedation as tolerated for appropriate EEG reading  - Patient successfully extubated 2/2/20, saturating well on RA    #Hx of CVA  Per medical records patient with CVA, 2 months ago.  - no evidence of acute stroke  - prophylactic measure to be initiated when upon extubation: statin therapy, ASA, plavix, control of hypertension     CARDIO  #Hypertension  Patient with reported history of hypertension. Unclear home medications. BP on arrival SBP >200, however currently normotensive (possibly secondary to sedation).   - obtain collateral   - SBP goal 120-150    RESP: REYES  GI: REYES  : REYES  RENAL: REYES  ID: REYES      PROPHYLACTIC MEASURE  F: N/A  E: replete to K>4, Mg>2  N: NONE    VTE Prophylaxis: Lovenox SubQ  C: FULL  D: MICU 65 y/o M with PMHx of HTN and CVA (2 months ago) transferred from [facility] s/p stroke (left facial and eyelid drooling and speech slurring presenting for possible thrombectomy, as patient outside window for tPA - c/b reintubation following thrombectomy, now with concern for seizure    HEMODYNAMICS:  Patient euvolemic on exam - intubated, unable to assess mental status, making urine, extremities warm and well perfused, appropriate urine output.    NEURO  #Metabolic Encephalopathy 2/2 stroke vs seizure (doubt)  Unclear etiology of events. CTH negative at [place] and [facility] (stroke code x2). s/p aborted thrombectomy with evidence of chronic ischemia. Repeat CT with evidence of contrast extravasation but no acute ischemic or hemorrhagic event. Patient with notable tremors and facial grimaces consistent with seizures.  - vEEG negative for seizures -> per neuro likely status epilepticus  - c/w Keppra 500mg BID  - minimize ativan and sedation  - epilepsy following, appreciate recs  - stroke team following appreciate recs  -f/u MRI   - Seroquel 12.5mg at night    #Post-Ictal state  Patient minimally responsive, concern for delirium vs postictal state  - titrate down precedex  - continue to monitor     #Intubated RESOLVED  Patient s/p emergent intubation for airway protection. Patient intubated and sedated.  - CPAP/SBT trial in AM - attempt to extubate  - wean off sedation as tolerated for appropriate EEG reading  - Patient successfully extubated 2/2/20, saturating well on RA    #Hx of CVA  Per medical records patient with CVA, 2 months ago.  - no evidence of acute stroke  - prophylactic measure to be initiated when upon extubation: statin therapy, ASA, plavix, control of hypertension     CARDIO  #Hypertension  Patient with reported history of hypertension. Unclear home medications. BP on arrival SBP >200, however currently normotensive (possibly secondary to sedation).   - obtain collateral   - SBP goal 120-150    RESP: REYES  GI: REYES  : REYES  RENAL: REYES  ID: REYES      PROPHYLACTIC MEASURE  F: N/A  E: replete to K>4, Mg>2  N: NONE    VTE Prophylaxis: Lovenox SubQ  C: FULL  D: MICU 63 y/o M with PMHx of HTN and CVA (2 months ago) transferred from [facility] s/p stroke (left facial and eyelid drooling and speech slurring presenting for possible thrombectomy, as patient outside window for tPA - c/b reintubation following thrombectomy, now with metabolic encephalopathy, concern for seizure vs acute stroke.     HEMODYNAMICS:  Patient hyperdynamic (tachycardic and EF 75% demonstrated on echocardiogram). Euvolemic on exam - intubated, unable to assess mental status, making urine, extremities warm and well perfused, appropriate urine output.    NEURO  #Metabolic Encephalopathy 2/2 stroke vs seizure (doubt)  Unclear etiology of events. CTH negative at [place] and [facility] (stroke code x2). s/p aborted thrombectomy with evidence of chronic ischemia. Repeat CT with evidence of contrast extravasation but no acute ischemic or hemorrhagic event. Patient with notable tremors and facial grimaces consistent with seizures.   - vEEG negative for seizures, no evidence of epileptiform activity -> per neuro likely status epilepticus  - s/p 1g Keppra load, c/w Keppra 500mg BID  - minimize ativan and sedation  - epilepsy following, appreciate recs  - stroke team following appreciate recs  - f/u MRI (short stroke protocol)   - Seroquel 12.5mg at night  - PT/Ot evaluation -> recommendation of acute rehab facility     #Post-Ictal state  Patient minimally responsive, concern for delirium vs postictal state  - Now off precedex and all sedation, less likely post-ictal, concern more for seizure   - continue to monitor     #Intubated RESOLVED  Patient s/p emergent intubation for airway protection. Patient intubated and sedated.  - CPAP/SBT trial in AM - attempt to extubate  - wean off sedation as tolerated for appropriate EEG reading  - Patient successfully extubated 2/2/20, saturating well on RA    #Hx of CVA  Per medical records patient with CVA, 2 months ago.  - Patient remains unarousable with limited movement of left lower extremity   - c/w ASA 81mg PO daily and Plavix 75mg PO daily  - c/w Atorvastatin 80mg PO daily  - f/u LOC    CARDIO  #Hypertension  Patient with reported history of hypertension. Patient on amlodipine 5mg outpatient. BP on arrival SBP >200. BP difficult to control requiring cardene gtt for management.   - home amlodipine increased to 10mg qd  - initiated enalapril 10mg qd  - initiated coreg 6.25mg BID   - If patient with breakthrough hypertension, may give labatelol 10mg IV push or Lasix 20mg IV push   - Doxazosin 2mg for BPH, however may contribute to resolving hypertension   - wean off cardene gtt as tolerated  - SBP goal <180  - continue to monitor BPs  - consider secondary causes of malignant hypertension     ID:  #New fever  Patient with new fever, Tmax 101. No evidence of infection - no leukocytosis, UA negative, CXR without evidence of infiltrate, no evidence of cellulitis, blood cultures NGTD  - f/u blood cultures  - will not treat infection at this time, possibly central fever in setting of possible stroke  - continue to monitor     :   #Overflow incontinence  Patient with increase urinary output, concern for overflow incontinence.  - Doxazosin 1mg increased to 2mg qd at bedtime   - goldberg d/c in AM, f/u TOV     PSYCH  #Depression  Per family, patient with history of depression. Previously on sertaline, now on trazadone 100mg.  - holding home meds in setting of lethargy    RESP: REYES  GI: REYES  RENAL: REYES    PROPHYLACTIC MEASURE  F: N/A  E: replete to K>4, Mg>2  N: NONE    VTE Prophylaxis: Lovenox SubQ  C: FULL  D: MICU    DISPO: Acute rehab facility

## 2020-02-04 NOTE — PHYSICAL THERAPY INITIAL EVALUATION ADULT - ADDITIONAL COMMENTS
Pt very lethargic during PT Eval, PLOF / social hx unclear at this time. Of note, per Dr. Guerin, pt's family reported that pt was driving up until ~2 weeks ago. Will f/u to obtain further history from family.

## 2020-02-04 NOTE — OCCUPATIONAL THERAPY INITIAL EVALUATION ADULT - GENERAL OBSERVATIONS, REHAB EVAL
Patient cleared for OT evaluation by JUAN LUIS Flores. Patient received semi-rodriguez, NAD, +goldberg, +[place], +IVx2, +NGT (feeds disconnected), +bl sequential compression devices, +z flow boots, +tele.

## 2020-02-04 NOTE — PHYSICAL THERAPY INITIAL EVALUATION ADULT - PERTINENT HX OF CURRENT PROBLEM, REHAB EVAL
65 y/o M with PMHx of HTN and CVA (2 months ago) transferred from [facility] s/p stroke (left facial and eyelid drooling and speech slurring presenting for possible thrombectomy, as patient outside window for tPA - c/b reintubation following thrombectomy, now with concern for seizure

## 2020-02-04 NOTE — PROGRESS NOTE ADULT - SUBJECTIVE AND OBJECTIVE BOX
Neurology Stroke Progress Note    INTERVAL HPI/OVERNIGHT EVENTS:  Patient seen and examined this morning lethargic. Patient did not speak, unable to obtain ROS    MEDICATIONS  (STANDING):  amLODIPine   Tablet 5 milliGRAM(s) Oral once  aspirin  chewable 81 milliGRAM(s) Oral daily  atorvastatin 80 milliGRAM(s) Oral at bedtime  carvedilol 6.25 milliGRAM(s) Oral every 12 hours  chlorhexidine 2% Cloths 1 Application(s) Topical <User Schedule>  clopidogrel Tablet 75 milliGRAM(s) Oral daily  doxazosin 2 milliGRAM(s) Oral at bedtime  enalapril 10 milliGRAM(s) Oral every 24 hours  enoxaparin Injectable 40 milliGRAM(s) SubCutaneous every 24 hours  levETIRAcetam  IVPB 500 milliGRAM(s) IV Intermittent every 12 hours  niCARdipine Infusion 5 mG/Hr (25 mL/Hr) IV Continuous <Continuous>  QUEtiapine 12.5 milliGRAM(s) Oral at bedtime    MEDICATIONS  (PRN):  acetaminophen    Suspension .. 650 milliGRAM(s) Oral every 8 hours PRN Temp greater or equal to 38C (100.4F)      Allergies    Allergy Status Unknown    Intolerances    Vital Signs Last 24 Hrs  T(C): 37.3 (2020 09:00), Max: 38.3 (2020 18:00)  T(F): 99.1 (2020 09:00), Max: 101 (2020 18:00)  HR: 106 (2020 11:00) (87 - 121)  BP: 161/78 (2020 08:35) (124/71 - 202/106)  BP(mean): 111 (2020 08:35) (90 - 145)  RR: 28 (2020 11:00) (18 - 33)  SpO2: 95% (2020 11:00) (93% - 97%)    Physical exam:  General: No acute distress, lethargic  Neurologic:  -Mental status: Lethargic, mute. Opens eyes to voice and noxious, tracks movement with eyes. Does not follow commands.   -Cranial nerves:   II: Visual fields are full to blink to threat  III, IV, VI: Extraocular movements are intact without nystagmus. Pupils equally round and reactive to light  VII: Questionable left forehead flattening, no facial droop.   Motor: Moves right arm and leg spontaneous to antigravity Left leg with 1/5 trace movement. left arm 0/5  Sensation: Decreased left sided sensation  Reflexes: Upgoing left toe, equivocal right toes.     LABS:                        14.3   6.34  )-----------( 171      ( 2020 04:24 )             41.0     02-04    145  |  112<H>  |  14  ----------------------------<  110<H>  3.8   |  19<L>  |  1.17    Ca    8.9      2020 04:24  Phos  2.3     02-04  Mg     1.9     02-04        Urinalysis Basic - ( 2020 21:00 )    Color: Yellow / Appearance: Clear / S.015 / pH: x  Gluc: x / Ketone: 15 mg/dL  / Bili: Negative / Urobili: 1.0 E.U./dL   Blood: x / Protein: Trace mg/dL / Nitrite: NEGATIVE   Leuk Esterase: NEGATIVE / RBC: 5-10 /HPF / WBC < 5 /HPF   Sq Epi: x / Non Sq Epi: 0-5 /HPF / Bacteria: Present /HPF        RADIOLOGY & ADDITIONAL TESTS:      Assessment and Plan  65 y/o male with PMHx of HTN, CVA (residual dysarthria and left-hand weakness), and anxiety disorder who presented with an acute R MCA stroke with perfusion defect on CT scan s/p attempted mechanical thrombectomy (aborted due to 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 Patient was brought to the MICU but after procedural sedation began to wear off, appeared to be contracted with intermittent left leg jerking. Second SC called, patient intubated, and repeat CTH remained unchanged. vEEG negative without epileptiform activity and patient extubated 2/2. Patient febrile and hypertensive overnight. Further workup needed, patient's symptoms could be new stroke vs metabolic encephalopathy vs seizures.     1)Secondary stroke prevention  - Continue ASA 81mg PO daily and Plavix 75mg PO daily  - Continue Atorvastatin 80mg PO daily    2) Stroke risk factors  -HTN, on amlodipine 5mg daily and cardene drip   -Previous CVA (baseline dysarthria and left-hand weakness)    3) Further workup   - SBP goal < 180s   - obtain MRI brain noncontrast  - no need for LOC at this point, obtain MRI brain first  - PT/OT/SLP     4)DVT prophylaxis   -Continue Lovenox SQ  and SCDs      Marisa Xie  Neurology Stroke NP  [phone] Neurology Stroke Progress Note    INTERVAL HPI/OVERNIGHT EVENTS:  Patient seen and examined this morning lethargic. Patient did not speak, unable to obtain ROS    MEDICATIONS  (STANDING):  amLODIPine   Tablet 5 milliGRAM(s) Oral once  aspirin  chewable 81 milliGRAM(s) Oral daily  atorvastatin 80 milliGRAM(s) Oral at bedtime  carvedilol 6.25 milliGRAM(s) Oral every 12 hours  chlorhexidine 2% Cloths 1 Application(s) Topical <User Schedule>  clopidogrel Tablet 75 milliGRAM(s) Oral daily  doxazosin 2 milliGRAM(s) Oral at bedtime  enalapril 10 milliGRAM(s) Oral every 24 hours  enoxaparin Injectable 40 milliGRAM(s) SubCutaneous every 24 hours  levETIRAcetam  IVPB 500 milliGRAM(s) IV Intermittent every 12 hours  niCARdipine Infusion 5 mG/Hr (25 mL/Hr) IV Continuous <Continuous>  QUEtiapine 12.5 milliGRAM(s) Oral at bedtime    MEDICATIONS  (PRN):  acetaminophen    Suspension .. 650 milliGRAM(s) Oral every 8 hours PRN Temp greater or equal to 38C (100.4F)      Allergies  Allergy Status Unknown    Vital Signs Last 24 Hrs  T(C): 37.3 (2020 09:00), Max: 38.3 (2020 18:00)  T(F): 99.1 (2020 09:00), Max: 101 (2020 18:00)  HR: 106 (2020 11:00) (87 - 121)  BP: 161/78 (2020 08:35) (124/71 - 202/106)  BP(mean): 111 (2020 08:35) (90 - 145)  RR: 28 (2020 11:00) (18 - 33)  SpO2: 95% (2020 11:00) (93% - 97%)    Physical exam:  General: No acute distress, lethargic  Neurologic:  -Mental status: Lethargic, mute. Opens eyes to voice and noxious, tracks movement with eyes. Does not follow commands.   -Cranial nerves:   II: Visual fields are full to blink to threat  III, IV, VI: Extraocular movements are intact without nystagmus. Pupils equally round and reactive to light  VII: Questionable left forehead flattening, no facial droop.   Motor: Moves right arm and leg spontaneous to antigravity Left leg with 1/5 trace movement. left arm 0/5  Sensation: Decreased left sided sensation  Reflexes: Upgoing left toe, equivocal right toes.     LABS:                        14.3   6.34  )-----------( 171      ( 2020 04:24 )             41.0     02-04    145  |  112<H>  |  14  ----------------------------<  110<H>  3.8   |  19<L>  |  1.17    Ca    8.9      2020 04:24  Phos  2.3     02-04  Mg     1.9     02-04        Urinalysis Basic - ( 2020 21:00 )    Color: Yellow / Appearance: Clear / S.015 / pH: x  Gluc: x / Ketone: 15 mg/dL  / Bili: Negative / Urobili: 1.0 E.U./dL   Blood: x / Protein: Trace mg/dL / Nitrite: NEGATIVE   Leuk Esterase: NEGATIVE / RBC: 5-10 /HPF / WBC < 5 /HPF   Sq Epi: x / Non Sq Epi: 0-5 /HPF / Bacteria: Present /HPF        RADIOLOGY & ADDITIONAL TESTS:      Assessment and Plan  65 y/o male with PMHx of HTN, CVA (residual dysarthria and left-hand weakness), and anxiety disorder who presented with an acute R MCA stroke with perfusion defect on CT scan s/p attempted mechanical thrombectomy (aborted due to 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 Patient was brought to the MICU but after procedural sedation began to wear off, appeared to be contracted with intermittent left leg jerking. Second SC called, patient intubated, and repeat CTH remained unchanged. vEEG negative without epileptiform activity and patient extubated 2/2. Patient febrile and hypertensive overnight. Further workup needed, patient's symptoms could be new stroke vs metabolic encephalopathy vs seizures.     1)Secondary stroke prevention  - Continue ASA 81mg PO daily and Plavix 75mg PO daily  - Continue Atorvastatin 80mg PO daily    2) Stroke risk factors  -HTN, on amlodipine 5mg daily and cardene drip   -Previous CVA (baseline dysarthria and left-hand weakness)    3) Further workup   - SBP goal < 180s   - obtain MRI brain noncontrast  - no need for LOC at this point, obtain MRI brain first  - PT/OT/SLP     4)DVT prophylaxis   -Continue Lovenox SQ  and SCDs      Marisa Xie  Neurology Stroke NP  [phone]

## 2020-02-04 NOTE — PHYSICAL THERAPY INITIAL EVALUATION ADULT - GENERAL OBSERVATIONS, REHAB EVAL
Pt found semi supine in bed in NAD difficult to arouse, +NGT off feeds, +L radial A-line, +b/l SCDs, +b/l z-flex boots, +tele, +b/l wrist restraints, +IV intact, cleared by JUAN LUIS Flores.

## 2020-02-04 NOTE — PHYSICAL THERAPY INITIAL EVALUATION ADULT - IMPAIRMENTS FOUND, PT EVAL
aerobic capacity/endurance/arousal, attention, and cognition/cognitive impairment/cranial and peripheral nerve integrity/decreased midline orientation/fine motor/gait, locomotion, and balance/gross motor/muscle strength/neuromotor development and sensory integration/poor safety awareness/posture/sensory integrity/tone

## 2020-02-04 NOTE — OCCUPATIONAL THERAPY INITIAL EVALUATION ADULT - GROSSLY INTACT, SENSORY
withdrawal to BLE/LUE. Patient demonstrating movement in RLE when noxious stimuli presented to right nailbed.

## 2020-02-04 NOTE — OCCUPATIONAL THERAPY INITIAL EVALUATION ADULT - NS ASR FOLLOW COMMAND OT EVAL
patient with low arousal levels. Able to half open eyes for brief moments of time./unable to follow commands/unable to answer questions

## 2020-02-04 NOTE — PHYSICAL THERAPY INITIAL EVALUATION ADULT - DIAGNOSIS, PT EVAL
5D: Impaired Motor Function and Sensory Integrity Associated with Nonprogressive Disorders of the Central Nervous System—Acquired in Adolescence or Adulthood 5A: Primary Prevention/Risk Reduction for Loss of Balance and Falling

## 2020-02-05 LAB
ANION GAP SERPL CALC-SCNC: 14 MMOL/L — SIGNIFICANT CHANGE UP (ref 5–17)
BUN SERPL-MCNC: 20 MG/DL — SIGNIFICANT CHANGE UP (ref 7–23)
CALCIUM SERPL-MCNC: 8.9 MG/DL — SIGNIFICANT CHANGE UP (ref 8.4–10.5)
CHLORIDE SERPL-SCNC: 113 MMOL/L — HIGH (ref 96–108)
CO2 SERPL-SCNC: 18 MMOL/L — LOW (ref 22–31)
CREAT SERPL-MCNC: 1.16 MG/DL — SIGNIFICANT CHANGE UP (ref 0.5–1.3)
GLUCOSE SERPL-MCNC: 111 MG/DL — HIGH (ref 70–99)
HCT VFR BLD CALC: 42.2 % — SIGNIFICANT CHANGE UP (ref 39–50)
HGB BLD-MCNC: 14.8 G/DL — SIGNIFICANT CHANGE UP (ref 13–17)
MAGNESIUM SERPL-MCNC: 2.2 MG/DL — SIGNIFICANT CHANGE UP (ref 1.6–2.6)
MCHC RBC-ENTMCNC: 32.3 PG — SIGNIFICANT CHANGE UP (ref 27–34)
MCHC RBC-ENTMCNC: 35.1 GM/DL — SIGNIFICANT CHANGE UP (ref 32–36)
MCV RBC AUTO: 92.1 FL — SIGNIFICANT CHANGE UP (ref 80–100)
NRBC # BLD: 0 /100 WBCS — SIGNIFICANT CHANGE UP (ref 0–0)
PHOSPHATE SERPL-MCNC: 2.6 MG/DL — SIGNIFICANT CHANGE UP (ref 2.5–4.5)
PLATELET # BLD AUTO: 188 K/UL — SIGNIFICANT CHANGE UP (ref 150–400)
POTASSIUM SERPL-MCNC: 3.9 MMOL/L — SIGNIFICANT CHANGE UP (ref 3.5–5.3)
POTASSIUM SERPL-SCNC: 3.9 MMOL/L — SIGNIFICANT CHANGE UP (ref 3.5–5.3)
RBC # BLD: 4.58 M/UL — SIGNIFICANT CHANGE UP (ref 4.2–5.8)
RBC # FLD: 12.8 % — SIGNIFICANT CHANGE UP (ref 10.3–14.5)
SODIUM SERPL-SCNC: 145 MMOL/L — SIGNIFICANT CHANGE UP (ref 135–145)
WBC # BLD: 6.43 K/UL — SIGNIFICANT CHANGE UP (ref 3.8–10.5)
WBC # FLD AUTO: 6.43 K/UL — SIGNIFICANT CHANGE UP (ref 3.8–10.5)

## 2020-02-05 PROCEDURE — 99233 SBSQ HOSP IP/OBS HIGH 50: CPT | Mod: GC

## 2020-02-05 PROCEDURE — 99233 SBSQ HOSP IP/OBS HIGH 50: CPT

## 2020-02-05 RX ORDER — ATORVASTATIN CALCIUM 80 MG/1
80 TABLET, FILM COATED ORAL AT BEDTIME
Refills: 0 | Status: DISCONTINUED | OUTPATIENT
Start: 2020-02-05 | End: 2020-02-18

## 2020-02-05 RX ORDER — DOXAZOSIN MESYLATE 4 MG
4 TABLET ORAL AT BEDTIME
Refills: 0 | Status: DISCONTINUED | OUTPATIENT
Start: 2020-02-05 | End: 2020-02-09

## 2020-02-05 RX ORDER — ATORVASTATIN CALCIUM 80 MG/1
40 TABLET, FILM COATED ORAL AT BEDTIME
Refills: 0 | Status: DISCONTINUED | OUTPATIENT
Start: 2020-02-05 | End: 2020-02-05

## 2020-02-05 RX ORDER — LEVETIRACETAM 250 MG/1
500 TABLET, FILM COATED ORAL EVERY 12 HOURS
Refills: 0 | Status: DISCONTINUED | OUTPATIENT
Start: 2020-02-05 | End: 2020-02-11

## 2020-02-05 RX ADMIN — CARVEDILOL PHOSPHATE 6.25 MILLIGRAM(S): 80 CAPSULE, EXTENDED RELEASE ORAL at 09:34

## 2020-02-05 RX ADMIN — LEVETIRACETAM 400 MILLIGRAM(S): 250 TABLET, FILM COATED ORAL at 09:33

## 2020-02-05 RX ADMIN — CLOPIDOGREL BISULFATE 75 MILLIGRAM(S): 75 TABLET, FILM COATED ORAL at 11:19

## 2020-02-05 RX ADMIN — CHLORHEXIDINE GLUCONATE 1 APPLICATION(S): 213 SOLUTION TOPICAL at 05:26

## 2020-02-05 RX ADMIN — CARVEDILOL PHOSPHATE 6.25 MILLIGRAM(S): 80 CAPSULE, EXTENDED RELEASE ORAL at 22:02

## 2020-02-05 RX ADMIN — AMLODIPINE BESYLATE 10 MILLIGRAM(S): 2.5 TABLET ORAL at 05:26

## 2020-02-05 RX ADMIN — ENOXAPARIN SODIUM 40 MILLIGRAM(S): 100 INJECTION SUBCUTANEOUS at 22:02

## 2020-02-05 RX ADMIN — LEVETIRACETAM 500 MILLIGRAM(S): 250 TABLET, FILM COATED ORAL at 22:04

## 2020-02-05 RX ADMIN — Medication 81 MILLIGRAM(S): at 11:19

## 2020-02-05 RX ADMIN — Medication 10 MILLIGRAM(S): at 17:15

## 2020-02-05 RX ADMIN — ATORVASTATIN CALCIUM 80 MILLIGRAM(S): 80 TABLET, FILM COATED ORAL at 22:02

## 2020-02-05 RX ADMIN — Medication 4 MILLIGRAM(S): at 22:43

## 2020-02-05 NOTE — PROGRESS NOTE ADULT - SUBJECTIVE AND OBJECTIVE BOX
Physical Medicine and Rehabilitation Progress Note:    Patient is a 64y old  Male who presents with a chief complaint of transfer from [facility] for stroke (04 Feb 2020 11:51)      HPI:  History obtained from [facility] medical record, as patient altered and subsequently intubated.     65 y/o male with PMH hypertension CVA (2 months ago at [facility]), dementia and depression transferred from [facility] with concern for stroke (MCA). Patient presented to [facility] after presenting to pharmacy with shirt and shoes on backwards and episode of falling on floor and speech slurring in pharmacy, found to have left sided facial drooping of eyelids, slurred speech, and expressive aphasia on presentation to [facility]. Last known normal unknown. Baseline mental status AAOx3 able to follow commands, completes ADLs independently.     Patient outside of window for tPA, transferred to [facility] for possible thrombectomy. At [facility], stroke code called on admission - imaging negative for acute infarct. Thrombectomy of R MCA attempted, however aborted as noted to have stump occlusion of mid right M1 with extensive JOSEPHINE collaterals - likely chronic occulusion. Patient extubated and upon presentation to MICU, patient noted to be altered with tongue rolling back, with contraction of left upper and lower extremity - patient intubated for airway protection. Second stroke code called, with repeat imaging without evidence of acute infarct. Patients admitted to MICU, intubated pending vEEG.      [facility]: Vitals: T: Afebrile | HR: 91-93 | BP: 147-159/ (MAP: ) | RR: 20-22 |   Labs: BMP: WNL | UA: WNL | BAL <10 | Lipid panel: ; Cholesterol 176 |   Interventions: CTH: No acute intracranial abnormality; chronic microvascular ischemic and volume changes - old posterior right frontal cortical infarct noted.   CT Perfusion Brain w/ contrast: Right MCA abnormal flow pattern consistent with ischemic change; no discrete evidence of infarct.     [facility]: Vitals: T: Afebrile | HR: 90 | BP: 208/123 | RR: 14  Labs: WBC 6.88; Hgb 13; Plt 144; Cr 1.21; Glucose 104  CT Brain Stroke (on arrival): Gyriform sites of cortical hyperdensity, presumed contrast enhancement, are favored to be subacute sites of infarction, superimposed on more chronic sites of right MCA infarction.  CT Brain Stroke (2nd stroke code, following aborted thrombectomy): Compared to prior study from earlier in the day, no significant change in scattered enhancement of the right frontal and posterior temporal lobes, likely secondary to subacute infarction  CT Perfusion: Abnormal perfusion with RAPID calculated mismatch volume of 44 ml and mismatch ratio is infinite within the right MCA territory.  CT Angio Head: Probable high-grade stenosis of the right M1 segment rather than occlusion. Multifocal areas of narrowing involving the right A1 and left M1 segment which may be secondary to intracranial atherosclerosis.  CT Angio Neck: Normal CTA neck.     Interventions: Intubated and sedated s/p thrombectomy, vEEG placed (31 Jan 2020 19:25)                            14.8   6.43  )-----------( 188      ( 05 Feb 2020 05:42 )             42.2       02-05    145  |  113<H>  |  20  ----------------------------<  111<H>  3.9   |  18<L>  |  1.16    Ca    8.9      05 Feb 2020 05:42  Phos  2.6     02-05  Mg     2.2     02-05      Vital Signs Last 24 Hrs  T(C): 36.7 (05 Feb 2020 14:29), Max: 37.9 (04 Feb 2020 17:48)  T(F): 98 (05 Feb 2020 14:29), Max: 100.3 (04 Feb 2020 17:48)  HR: 93 (05 Feb 2020 13:00) (75 - 102)  BP: 162/93 (05 Feb 2020 13:00) (147/85 - 162/93)  BP(mean): 121 (05 Feb 2020 13:00) (109 - 121)  RR: 29 (05 Feb 2020 13:00) (20 - 31)  SpO2: 97% (05 Feb 2020 13:00) (93% - 99%)    MEDICATIONS  (STANDING):  amLODIPine   Tablet 10 milliGRAM(s) Oral every 24 hours  aspirin  chewable 81 milliGRAM(s) Oral daily  atorvastatin 80 milliGRAM(s) Oral at bedtime  carvedilol 6.25 milliGRAM(s) Oral every 12 hours  chlorhexidine 2% Cloths 1 Application(s) Topical <User Schedule>  clopidogrel Tablet 75 milliGRAM(s) Oral daily  doxazosin 2 milliGRAM(s) Oral at bedtime  enalapril 10 milliGRAM(s) Oral every 24 hours  enoxaparin Injectable 40 milliGRAM(s) SubCutaneous every 24 hours  levETIRAcetam  Solution 500 milliGRAM(s) Enteral Tube every 12 hours    MEDICATIONS  (PRN):  acetaminophen    Suspension .. 650 milliGRAM(s) Oral every 8 hours PRN Temp greater or equal to 38C (100.4F)    Currently Undergoing Physical/ Occupational  Therapy at bedside.    Functional Status Assessment: 2/4/2020      Previous Level of Function:     · Ambulation Skills	independent	  · Transfer Skills	independent	  · ADL Skills	independent	  · Work/Leisure Activity	independent	  · Additional Comments	Pt very lethargic during PT Eval, PLOF / social hx unclear at this time. Of note, per Dr. Guerin, pt's family reported that pt was driving up until ~2 weeks ago. Will f/u to obtain further history from family.	    Cognitive Status Examination:   · Orientation	unable to assess	  · Level of Consciousness	lethargic/somnolent; confused; agitated	  · Follows Commands and Answers Questions	unable to follow commands	  · Personal Safety and Judgment	impaired; impulsive spontaneous movements	    Range of Motion Exam:   · Passive Range of Motion Examination	bilateral lower extremity Passive ROM was WFL (within functional limits); bilateral upper extremity Passive ROM was WFL (within functional limits)	    Manual Muscle Testing:   · Manual Muscle Testing Results	R UE/LE grossly >3/5 based on spontaneous non-purposeful movements, L UE/LE ~1/5 t/o trace muscle activation	    Muscle Tone Assessment:   · Muscle Tone Assessment	Left UE; hypertonic	    Bed Mobility: Rolling/Turning:     · Level of [place]	maximum assist (25% patients effort)	  · Physical Assist/Nonphysical Assist	2 person assist; nonverbal cues (demo/gestures); verbal cues	    Bed Mobility: Scooting/Bridging:     · Level of [place]	maximum assist (25% patients effort)	  · Physical Assist/Nonphysical Assist	2 person assist; nonverbal cues (demo/gestures); verbal cues	    Bed Mobility: Sit to Supine:     · Level of [place]	maximum assist (25% patients effort)	  · Physical Assist/Nonphysical Assist	2 person assist; nonverbal cues (demo/gestures); verbal cues	    Bed Mobility: Supine to Sit:     · Level of [place]	maximum assist (25% patients effort)	  · Physical Assist/Nonphysical Assist	2 person assist; nonverbal cues (demo/gestures); verbal cues	    Bed Mobility Analysis:     · Bed Mobility Limitations	decreased ability to use arms for pushing/pulling; decreased ability to use legs for bridging/pushing; impaired ability to control trunk for mobility	  · Impairments Contributing to Impaired Bed Mobility	impaired balance; cognition; impaired coordination; impaired motor control; abnormal muscle tone; impaired postural control; decreased strength	    Transfer: Sit to Stand:     · Level of [place]	TBA	    Transfer: Stand to Sit:     · Level of [place]	TBA	    Gait Skills:     · Level of [place]	TBA	    Balance Skills Assessment:     · Sitting Balance: Static	poor balance	  · Sitting Balance: Dynamic	poor balance	    Sensory Examination:   Sensory Examination:    Grossly Intact:   · Gross Sensory Examination	unable to formally assess secondary to lethargy	      Pain Sensation:   · Left UE	mild impairment  +withdrawal	  · Right UE	mild impairment  +withdrawal	  · Left LE	mild impairment  +withdrawal	  · Right LE	mild impairment  +withdrawal	      Fine Motor Coordination:   Fine Motor Coordination Examination:    Grossly Intact:   · Grossly Intact	unable to formally assess secondary to lethargy	      Clinical Impressions:   · Criteria for Skilled Therapeutic Interventions	impairments found; functional limitations in following categories; risk reduction/prevention; rehab potential; therapy frequency; anticipated discharge recommendation	  · Impairments Found (describe specific impairments)	aerobic capacity/endurance; poor safety awareness; posture; gait, locomotion, and balance; sensory integrity; arousal, attention, and cognition; fine motor; gross motor; tone; cognitive impairment; cranial and peripheral nerve integrity; muscle strength; decreased midline orientation; neuromotor development and sensory integration	  · Functional Limitations in Following Categories (describe specific limitations)	self-care; home management; work; community/leisure	  · Risk Reduction/Prevention (Describe Specific Areas of risk reduction/prevention)	risk factors	  · Risk Areas	fall; impaired judgment; safety awareness; cognitive impairment; neglect	  · Rehab Potential	fair, will monitor progress closely	  · Therapy Frequency	3-5x/week	        PM&R Impression: as above    Current Disposition Plan Recommendations: acute rehab placement

## 2020-02-05 NOTE — PROGRESS NOTE ADULT - ASSESSMENT
per Neurology    65 y/o male with PMHx of HTN, CVA (residual dysarthria and left-hand weakness), and anxiety disorder who presented with an acute R MCA stroke with perfusion defect on CT scan s/p attempted mechanical thrombectomy (aborted due to 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 Patient was brought to the MICU but after procedural sedation began to wear off, appeared to be contracted with intermittent left leg jerking. Second SC called, patient intubated, and repeat CTH remained unchanged. vEEG negative without epileptiform activity and patient extubated 2/2. Patient febrile and hypertensive overnight. Further workup needed, patient's symptoms could be new stroke vs metabolic encephalopathy vs seizures.     1)Secondary stroke prevention  - Continue ASA 81mg PO daily and Plavix 75mg PO daily  - Continue Atorvastatin 80mg PO daily    2) Stroke risk factors  -HTN, on amlodipine 5mg daily and cardene drip   -Previous CVA (baseline dysarthria and left-hand weakness)    3) Further workup   - SBP goal < 180s   - obtain MRI brain noncontrast  - no need for LOC at this point, obtain MRI brain first  - PT/OT/SLP     4)DVT prophylaxis   -Continue Lovenox SQ  and SCDs

## 2020-02-05 NOTE — PROGRESS NOTE ADULT - SUBJECTIVE AND OBJECTIVE BOX
OVERNIGHT EVENTS:    SUBJECTIVE / INTERVAL HPI: Patient seen and examined at bedside.     VITAL SIGNS:  Vital Signs Last 24 Hrs  T(C): 36.7 (2020 14:29), Max: 37.9 (2020 17:48)  T(F): 98 (2020 14:29), Max: 100.3 (2020 17:48)  HR: 86 (2020 15:20) (75 - 102)  BP: 155/77 (2020 15:20) (147/85 - 170/85)  BP(mean): 108 (2020 15:20) (108 - 121)  RR: 22 (2020 15:20) (20 - 31)  SpO2: 97% (2020 15:20) (93% - 99%)    20 @ 07:01  -  20 @ 07:00  --------------------------------------------------------  IN: 742.5 mL / OUT: 1465 mL / NET: -722.5 mL    20 @ 07:01  -  20 @ 15:49  --------------------------------------------------------  IN: 120 mL / OUT: 70 mL / NET: 50 mL      PHYSICAL EXAM:  General:  male resting in bed; somnolent and lethargic, vocalizing but indiscernible   HEENT: NC/AT; PERRL, anicteric sclera, unable to test ocular movements   Neck: supple; no JVD; no cervical or submandibular lymphadenopathy   Cardiovascular: +S1/S2; RRR  Respiratory: CTA B/L; no W/R/R  Gastrointestinal: overly nourished; soft, NT/ND; +BSx4, +NG tube in place   Genitourinary: +goldberg in place   Extremities: WWP; no edema, clubbing or cyanosis  Vascular: 2+ radial, DP/PT pulses B/L  Neurological: AAOx0; notably agitated, but moving LE b/l, does not follow commands with intention but lifts LUE off bed, open eyes in response to name       MEDICATIONS:  MEDICATIONS  (STANDING):  amLODIPine   Tablet 10 milliGRAM(s) Oral every 24 hours  aspirin  chewable 81 milliGRAM(s) Oral daily  atorvastatin 80 milliGRAM(s) Oral at bedtime  carvedilol 6.25 milliGRAM(s) Oral every 12 hours  chlorhexidine 2% Cloths 1 Application(s) Topical <User Schedule>  clopidogrel Tablet 75 milliGRAM(s) Oral daily  doxazosin 2 milliGRAM(s) Oral at bedtime  enalapril 10 milliGRAM(s) Oral every 24 hours  enoxaparin Injectable 40 milliGRAM(s) SubCutaneous every 24 hours  levETIRAcetam  Solution 500 milliGRAM(s) Enteral Tube every 12 hours    MEDICATIONS  (PRN):  acetaminophen    Suspension .. 650 milliGRAM(s) Oral every 8 hours PRN Temp greater or equal to 38C (100.4F)      ALLERGIES:  Allergies    Allergy Status Unknown    Intolerances        LABS:                        14.8   6.43  )-----------( 188      ( 2020 05:42 )             42.2     02-05    145  |  113<H>  |  20  ----------------------------<  111<H>  3.9   |  18<L>  |  1.16    Ca    8.9      2020 05:42  Phos  2.6     02-05  Mg     2.2     02-05        Urinalysis Basic - ( 2020 21:00 )    Color: Yellow / Appearance: Clear / S.015 / pH: x  Gluc: x / Ketone: 15 mg/dL  / Bili: Negative / Urobili: 1.0 E.U./dL   Blood: x / Protein: Trace mg/dL / Nitrite: NEGATIVE   Leuk Esterase: NEGATIVE / RBC: 5-10 /HPF / WBC < 5 /HPF   Sq Epi: x / Non Sq Epi: 0-5 /HPF / Bacteria: Present /HPF      CAPILLARY BLOOD GLUCOSE    RADIOLOGY & ADDITIONAL TESTS:   Reviewed.    MR Head No Cont (20 @ 21:15):     IMPRESSION: Limited study due to motion artifact, however there is right MCA territory infarct involving the basal ganglia, frontal lobe and temporal lobe as described above. There is also chronic encephalomalacia seen in the left parietal lobe, likely from prior infarction. Small vessel ischemic disease. OVERNIGHT EVENTS:   Overnight, went for MRI. BP decreased to 103/57 and UO decreased to 30cc at one point. BP now back to 160s/80s.     SUBJECTIVE / INTERVAL HPI:   Patient seen and examined at bedside. Opens eyes to name, vocalizes, and moving UE and LE. Still very lethargic, unintentional in movements and expression. Does not appear to be in any acute distress. Unable to obtain full ROS.     VITAL SIGNS:  Vital Signs Last 24 Hrs  T(C): 36.7 (2020 14:29), Max: 37.9 (2020 17:48)  T(F): 98 (2020 14:29), Max: 100.3 (2020 17:48)  HR: 86 (2020 15:20) (75 - 102)  BP: 155/77 (2020 15:20) (147/85 - 170/85)  BP(mean): 108 (2020 15:20) (108 - 121)  RR: 22 (2020 15:20) (20 - 31)  SpO2: 97% (2020 15:20) (93% - 99%)    20 @ 07:  -  20 @ 07:00  --------------------------------------------------------  IN: 742.5 mL / OUT: 1465 mL / NET: -722.5 mL    20 @ 07:01  -  20 @ 15:49  --------------------------------------------------------  IN: 120 mL / OUT: 70 mL / NET: 50 mL      PHYSICAL EXAM:  General:  male resting in bed; somnolent and lethargic, vocalizing but indiscernible   HEENT: NC/AT; PERRL, anicteric sclera, unable to test ocular movements   Neck: supple; no JVD; no cervical or submandibular lymphadenopathy   Cardiovascular: +S1/S2; RRR  Respiratory: CTA B/L; no W/R/R  Gastrointestinal: overly nourished; soft, NT/ND; +BSx4, +NG tube in place   Genitourinary: +goldberg in place   Extremities: WWP; no edema, clubbing or cyanosis  Vascular: 2+ radial, DP/PT pulses B/L  Neurological: AAOx0; notably agitated, but moving LE b/l, does not follow commands with intention but lifts LUE off bed, open eyes in response to name       MEDICATIONS:  MEDICATIONS  (STANDING):  amLODIPine   Tablet 10 milliGRAM(s) Oral every 24 hours  aspirin  chewable 81 milliGRAM(s) Oral daily  atorvastatin 80 milliGRAM(s) Oral at bedtime  carvedilol 6.25 milliGRAM(s) Oral every 12 hours  chlorhexidine 2% Cloths 1 Application(s) Topical <User Schedule>  clopidogrel Tablet 75 milliGRAM(s) Oral daily  doxazosin 2 milliGRAM(s) Oral at bedtime  enalapril 10 milliGRAM(s) Oral every 24 hours  enoxaparin Injectable 40 milliGRAM(s) SubCutaneous every 24 hours  levETIRAcetam  Solution 500 milliGRAM(s) Enteral Tube every 12 hours    MEDICATIONS  (PRN):  acetaminophen    Suspension .. 650 milliGRAM(s) Oral every 8 hours PRN Temp greater or equal to 38C (100.4F)      ALLERGIES:  Allergies    Allergy Status Unknown    Intolerances        LABS:                        14.8   6.43  )-----------( 188      ( 2020 05:42 )             42.2     02-05    145  |  113<H>  |  20  ----------------------------<  111<H>  3.9   |  18<L>  |  1.16    Ca    8.9      2020 05:42  Phos  2.6     02-05  Mg     2.2     02-05        Urinalysis Basic - ( 2020 21:00 )    Color: Yellow / Appearance: Clear / S.015 / pH: x  Gluc: x / Ketone: 15 mg/dL  / Bili: Negative / Urobili: 1.0 E.U./dL   Blood: x / Protein: Trace mg/dL / Nitrite: NEGATIVE   Leuk Esterase: NEGATIVE / RBC: 5-10 /HPF / WBC < 5 /HPF   Sq Epi: x / Non Sq Epi: 0-5 /HPF / Bacteria: Present /HPF      CAPILLARY BLOOD GLUCOSE    RADIOLOGY & ADDITIONAL TESTS:   Reviewed.    MR Head No Cont (20 @ 21:15):     IMPRESSION: Limited study due to motion artifact, however there is right MCA territory infarct involving the basal ganglia, frontal lobe and temporal lobe as described above. There is also chronic encephalomalacia seen in the left parietal lobe, likely from prior infarction. Small vessel ischemic disease.

## 2020-02-05 NOTE — PROGRESS NOTE ADULT - ASSESSMENT
65 y/o M with PMHx of HTN and CVA (2 months ago) transferred from [facility] s/p stroke (left facial and eyelid drooling and speech slurring presenting for possible thrombectomy, as patient outside window for tPA - c/b reintubation following thrombectomy, now with metabolic encephalopathy, concern for seizure vs acute stroke.     HEMODYNAMICS:  Patient hyperdynamic (tachycardic and EF 75% demonstrated on echocardiogram).   Euvolemic on exam - intubated, unable to assess mental status, making urine, extremities warm and well perfused, appropriate urine output.    NEURO  #Metabolic Encephalopathy 2/2 stroke vs seizure (less likely):   Unclear etiology of events. CTH negative at [place] and [facility] (stroke code x2). s/p aborted thrombectomy with evidence of chronic ischemia. Repeat CT with evidence of contrast extravasation but no acute ischemic or hemorrhagic event. Patient with notable tremors and facial grimaces. VEEG showed no signs of seizure and MRI brain reveals R MCA infarct affecting the basal ganglia, frontal, and temporal lobes, likely etiology of metabolic encephalopathy.   - s/p 1g Keppra load, c/w Keppra 500mg BID  - minimize ativan and sedation  - epilepsy following, appreciate recs  - stroke team following appreciate recs  - Seroquel 12.5mg at night  - PT recommending of acute rehab facility     #Intubated RESOLVED  Patient s/p emergent intubation for airway protection. Patient intubated and sedated.  - CPAP/SBT trial in AM - attempt to extubate  - wean off sedation as tolerated for appropriate EEG reading  - Patient successfully extubated 2/2/20, saturating well on RA    #Hx of CVA:   Per medical records patient with CVA, 2 months ago. 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   - Patient now more arousable, though still lethargic/minimally responsive. Vocalizing but nothing discernable. Moves LE b/l and RUE.    - c/w ASA 81mg PO daily and Plavix 75mg PO daily  - c/w Atorvastatin 80mg PO daily  - consider holding off on LOC for now given little utility but will follow-up with neurology to be certain     CARDIO  #Hypertension:   Patient with reported history of hypertension. Patient on amlodipine 5mg outpatient. BP on arrival SBP >200. BP difficult to control initially requiring cardipine gtt for management. Now off ggt and BP at goal (SBP<180), so no need for additional anti-hypertensive medications at this time.   - c/w home amlodipine increased to 10mg qd  - c/w enalapril 10mg qd  - c/w Coreg 6.25mg BID   - may give labatelol 10mg IV push or Lasix 20mg IV push if continued episodes of HTN   - c/w doxazosin 2mg for BPH, however may contribute to resolving hypertension   - SBP goal <180  - continue to monitor BPs  - consider secondary causes of malignant hypertension     ID:  #Episodic fever:   Patient with new fever, Tmax 101. No evidence of infection - no leukocytosis, UA negative, CXR without evidence of infiltrate, no evidence of cellulitis, blood cultures NGTD  - f/u blood cultures  - will not treat infection at this time, possibly central fever in setting of possible stroke  - continue to monitor     :   #Overflow incontinence:   Patient with increase urinary output, concern for overflow incontinence.  - c/w Doxazosin 1mg 2mg qd at bedtime   - goldberg discontinued today   - f/u result of TOV     PSYCH  #Depression  Per family, patient with history of depression. Previously on sertaline, now on trazadone 100mg.  - holding home meds in setting of lethargy    PROPHYLACTIC MEASURE  F: none   E: replete to K>4, Mg>2  N: starting Jevity 1.2 10mL/hour     VTE Prophylaxis: Lovenox SubQ  C: FULL  D: MICU    DISPO: Acute rehab facility 65 y/o M with PMHx of HTN and CVA (2 months ago) transferred from [facility] s/p stroke (left facial and eyelid drooling and speech slurring presenting for possible thrombectomy, as patient outside window for tPA - c/b reintubation following thrombectomy, now with metabolic encephalopathy, concern for seizure vs acute stroke.     HEMODYNAMICS:  Patient hyperdynamic (tachycardic and EF 75% demonstrated on echocardiogram).   Euvolemic on exam - intubated, unable to assess mental status, making urine, extremities warm and well perfused, appropriate urine output.    NEURO  #Metabolic Encephalopathy 2/2 stroke vs seizure (less likely):   Unclear etiology of events. CTH negative at [place] and [facility] (stroke code x2). s/p aborted thrombectomy with evidence of chronic ischemia. Repeat CT with evidence of contrast extravasation but no acute ischemic or hemorrhagic event. Patient with notable tremors and facial grimaces. VEEG showed no signs of seizure and MRI brain reveals R MCA infarct affecting the basal ganglia, frontal, and temporal lobes, likely etiology of metabolic encephalopathy.   - s/p 1g Keppra load, c/w Keppra 500mg BID  - minimize ativan and sedation  - epilepsy following, appreciate recs  - stroke team following appreciate recs  - DC Seroquel 12.5mg at night as agitation improved and he is lethargic  - PT recommending of acute rehab facility     #Intubated RESOLVED  Patient s/p emergent intubation for airway protection. Patient intubated and sedated.  - CPAP/SBT trial in AM - attempt to extubate  - wean off sedation as tolerated for appropriate EEG reading  - Patient successfully extubated 2/2/20, saturating well on RA    #Hx of CVA:   Per medical records patient with CVA, 2 months ago. 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   - Patient now more arousable, though still lethargic/minimally responsive. Vocalizing but nothing discernable. Moves LE b/l and RUE.    - c/w ASA 81mg PO daily and Plavix 75mg PO daily  - c/w Atorvastatin 80mg PO daily  - holding off on LOC for now given marginal respiratory status     CARDIO  #Hypertension:   Patient with reported history of hypertension. Patient on amlodipine 5mg outpatient. BP on arrival SBP >200. BP difficult to control initially requiring cardipine gtt for management. Now off ggt and BP at goal (SBP<180), so no need for additional anti-hypertensive medications at this time.   - c/w home amlodipine increased to 10mg qd  - c/w enalapril 10mg qd  - c/w Coreg 6.25mg BID   - may give labatelol 10mg IV push or Lasix 20mg IV push if continued episodes of HTN   - c/w doxazosin 2mg for BPH, however may contribute to resolving hypertension   - SBP goal <180  - continue to monitor BPs  - consider secondary causes of malignant hypertension     ID:  #Episodic fever:   Patient with new fever, Tmax 101. No evidence of infection - no leukocytosis, UA negative, CXR without evidence of infiltrate, no evidence of cellulitis, blood cultures NGTD  - f/u blood cultures  - will not treat infection at this time, possibly central fever in setting of stroke  - continue to monitor     :   #Overflow incontinence:   Patient with increase urinary output, concern for overflow incontinence.  - c/w Doxazosin 1mg 2mg qd at bedtime   - goldberg discontinued today   - f/u result of TOV     PSYCH  #Depression  Per family, patient with history of depression. Previously on sertaline, now on trazadone 100mg.  - holding home meds in setting of lethargy    PROPHYLACTIC MEASURE  F: none   E: replete to K>4, Mg>2  N: starting Jevity 1.2 10mL/hour     VTE Prophylaxis: Lovenox SubQ  C: FULL  D: MICU    DISPO: Acute rehab facility

## 2020-02-05 NOTE — CHART NOTE - NSCHARTNOTEFT_GEN_A_CORE
Admitting Diagnosis:   Patient is a 64y old  Male who presents with a chief complaint of transfer from [facility] for stroke (04 Feb 2020 11:51)      PAST MEDICAL & SURGICAL HISTORY:  Hypertension  CVA (cerebral vascular accident)  No significant past surgical history      Current Nutrition Order: NPO     PO Intake: NPO    GI Issues: No apparent GI distress, although last BM unclear (confirmed by RN)    Pain: Unable to assess    Skin Integrity: 3+ edema L hand, surgical incision    Labs:   02-05    145  |  113<H>  |  20  ----------------------------<  111<H>  3.9   |  18<L>  |  1.16    Ca    8.9      05 Feb 2020 05:42  Phos  2.6     02-05  Mg     2.2     02-05      CAPILLARY BLOOD GLUCOSE          Medications:  MEDICATIONS  (STANDING):  amLODIPine   Tablet 10 milliGRAM(s) Oral every 24 hours  aspirin  chewable 81 milliGRAM(s) Oral daily  atorvastatin 80 milliGRAM(s) Oral at bedtime  carvedilol 6.25 milliGRAM(s) Oral every 12 hours  chlorhexidine 2% Cloths 1 Application(s) Topical <User Schedule>  clopidogrel Tablet 75 milliGRAM(s) Oral daily  doxazosin 2 milliGRAM(s) Oral at bedtime  enalapril 10 milliGRAM(s) Oral every 24 hours  enoxaparin Injectable 40 milliGRAM(s) SubCutaneous every 24 hours  levETIRAcetam  Solution 500 milliGRAM(s) Enteral Tube every 12 hours    MEDICATIONS  (PRN):  acetaminophen    Suspension .. 650 milliGRAM(s) Oral every 8 hours PRN Temp greater or equal to 38C (100.4F)      Weight:  Admit wt- 82.3kg    Weight Change: No new wts to assess    Estimated energy needs:   Ht: 5'8" (estimated), Wt: 82.3kg, IBW: 70kg, 117.5%IBW.   Needs calculated based on [facility] standards of care. Needs adjusted for age  20-25kcal/kg= 1646-2057kcal  1.0-1.2gms pro/kg= 82-98gms protein    Subjective:   Pt seen for initial assessment. 63 y/o M with PMHx of HTN and CVA (2 months ago) transferred from [facility] s/p stroke (left facial and eyelid drooling and speech slurring) presenting for possible thrombectomy, as patient outside window for tPA. 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 Patient was brought to the MICU but after procedural sedation began to wear off, appeared to be contracted with intermittent left leg jerking. Second SC called, patient intubated, and repeat CTH remained unchanged. S/P gastric intubation/lavage 2/1. Now with metabolic encephalopathy, concern for seizure vs acute stroke. Pt now extubated, however noted to be lethargic/restless/not responding formally/unarousable with limited movement of left lower extremity. Pt seen resting in bed, no visitors at bedside, . Pt has been NPO since admission (x5 days), recommend initiation of EN at this time, recommendations below. Unable to obtain subjective information on initial assessment, please re-attempt on follow up if feasible. Full nutrition recommendations below as well. RD to follow up per protocol.     Previous Nutrition Diagnosis:  Inadequate Energy Intake RT inability to meet needs w/ NPO status AEB consuming 0% EER.    Active [ x  ]  Resolved [   ]    Goal: Diet to be advanced as medically feasible, pt to consume >75% EER via tolerated route.    Recommendations:  1. Given pt has been NPO x5 days, recommend initiation of EN: Jevity 1.2 (route per MD) starting at 10ml/hr and increasing as tolerated by pt towards goal rate of 65ml/hr x 24hrs providing 1560ml TV, 1872kcal, 86gms protein, 1258ml water (23kcal/kg and 1.0gms pro/kg based on ABW of 82.3kg). Monitor s/s intolerance, maintain aspiration precautions at all times. Additional water per MD discretion.  *paged team  2. Monitor lytes and replete PRN  3. Bi-weekly wts  4. Monitor BM's, bowel regimen per MD discretion    Education: N/A    Risk Level: High [  x ] Moderate [   ] Low [   ]

## 2020-02-05 NOTE — PROGRESS NOTE ADULT - SUBJECTIVE AND OBJECTIVE BOX
Neurology Stroke Progress Note    INTERVAL HPI/OVERNIGHT EVENTS:  MRI done overnight with right MCA infarct  Tmax 37.9 yesterday evening  Patient seen and examined. Lethargic, not speaking or following commands    MEDICATIONS  (STANDING):  amLODIPine   Tablet 10 milliGRAM(s) Oral every 24 hours  aspirin  chewable 81 milliGRAM(s) Oral daily  atorvastatin 80 milliGRAM(s) Oral at bedtime  carvedilol 6.25 milliGRAM(s) Oral every 12 hours  chlorhexidine 2% Cloths 1 Application(s) Topical <User Schedule>  clopidogrel Tablet 75 milliGRAM(s) Oral daily  doxazosin 2 milliGRAM(s) Oral at bedtime  enalapril 10 milliGRAM(s) Oral every 24 hours  enoxaparin Injectable 40 milliGRAM(s) SubCutaneous every 24 hours  levETIRAcetam  Solution 500 milliGRAM(s) Enteral Tube every 12 hours    MEDICATIONS  (PRN):  acetaminophen    Suspension .. 650 milliGRAM(s) Oral every 8 hours PRN Temp greater or equal to 38C (100.4F)      Allergies    Allergy Status Unknown    Intolerances        ROS: As per HPI, otherwise negative    Vital Signs Last 24 Hrs  T(C): 36.7 (2020 14:29), Max: 37.9 (2020 17:48)  T(F): 98 (2020 14:29), Max: 100.3 (2020 17:48)  HR: 85 (2020 16:00) (75 - 102)  BP: 133/97 (2020 16:00) (133/97 - 170/85)  BP(mean): 109 (2020 16:00) (108 - 121)  RR: 26 (2020 16:00) (20 - 30)  SpO2: 97% (2020 16:00) (93% - 99%)    Physical exam:    Neurologic:  Mental status: lethargic, not speaking or following commands, opens eyes to voice and noxious.  Cranial nerves:   II: visual fields are full to confrontation. pupils equally round and reactive to light,   III, IV, VI: EOMI   VII: slight left facial droop  Motor: Normal bulk and tone, moves right arm and leg spontaneously antigravity, left leg with trace foot movement, left arm 0/5  Sensation: Decreased left sided sensation  Coordination: did not cooperate  Reflexes: Upgoing left toe, equivocal right toes.   Gait: deferred          LABS:                        14.8   6.43  )-----------( 188      ( 2020 05:42 )             42.2     02-05    145  |  113<H>  |  20  ----------------------------<  111<H>  3.9   |  18<L>  |  1.16    Ca    8.9      2020 05:42  Phos  2.6     02-05  Mg     2.2     02-05        Urinalysis Basic - ( 2020 21:00 )    Color: Yellow / Appearance: Clear / S.015 / pH: x  Gluc: x / Ketone: 15 mg/dL  / Bili: Negative / Urobili: 1.0 E.U./dL   Blood: x / Protein: Trace mg/dL / Nitrite: NEGATIVE   Leuk Esterase: NEGATIVE / RBC: 5-10 /HPF / WBC < 5 /HPF   Sq Epi: x / Non Sq Epi: 0-5 /HPF / Bacteria: Present /HPF        RADIOLOGY & ADDITIONAL TESTS:  < from: MR Head No Cont (20 @ 21:15) >  Agree with the findings of a large right MCA territorial infarction involving the right frontal and temporal lobes and the right basal ganglia. There is mass effect on the right lateral ventricle without significant midline shift.    Evaluation for hemorrhage is severely limited due to extensive motion artifact. There is apparent susceptibility related signal loss within the right thalamus and corona radiata which may be artifactual due to motion. Continued follow-up is recommended.    Agree with the finding of a chronic left parietal infarction.    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 Second stroke code called when pt had intermittent jerking, vEEG negative, pt now extubated.   MRI done overnight showed acute right MCA infarct      1)Secondary stroke prevention  -ASA 81 and Plavix 75  -Atorvastatin 80    2) Stroke risk factors  -HTN, on amlodipine 5mg daily and cardene drip   -Previous CVA (baseline dysarthria and left-hand weakness)  -Still being worked up    3) Further workup   -Pt had severe reaction to benzos so there is a concern for safety of doing LOC as pt would likely receive versed during this procedure; however, LOC would be beneficial for stroke workup as it could potentially  if there is a clot in the left atrium  -PT/OT rec for AR  -SLP - pt currently with NG tube  -Fevers, respiratory per primary team    DVT prophylaxis   -Lovenox SQ and SCDs Neurology Stroke Progress Note    INTERVAL HPI/OVERNIGHT EVENTS:  MRI done overnight with right MCA infarct  Tmax 37.9 yesterday evening  Patient seen and examined. Lethargic, not speaking or following commands    MEDICATIONS  (STANDING):  amLODIPine   Tablet 10 milliGRAM(s) Oral every 24 hours  aspirin  chewable 81 milliGRAM(s) Oral daily  atorvastatin 80 milliGRAM(s) Oral at bedtime  carvedilol 6.25 milliGRAM(s) Oral every 12 hours  chlorhexidine 2% Cloths 1 Application(s) Topical <User Schedule>  clopidogrel Tablet 75 milliGRAM(s) Oral daily  doxazosin 2 milliGRAM(s) Oral at bedtime  enalapril 10 milliGRAM(s) Oral every 24 hours  enoxaparin Injectable 40 milliGRAM(s) SubCutaneous every 24 hours  levETIRAcetam  Solution 500 milliGRAM(s) Enteral Tube every 12 hours    MEDICATIONS  (PRN):  acetaminophen    Suspension .. 650 milliGRAM(s) Oral every 8 hours PRN Temp greater or equal to 38C (100.4F)      Allergies    Allergy Status Unknown          ROS: As per HPI, otherwise negative    Vital Signs Last 24 Hrs  T(C): 36.7 (2020 14:29), Max: 37.9 (2020 17:48)  T(F): 98 (2020 14:29), Max: 100.3 (2020 17:48)  HR: 85 (2020 16:00) (75 - 102)  BP: 133/97 (2020 16:00) (133/97 - 170/85)  BP(mean): 109 (2020 16:00) (108 - 121)  RR: 26 (2020 16:00) (20 - 30)  SpO2: 97% (2020 16:00) (93% - 99%)    Physical exam:    Neurologic:  Mental status: lethargic, not speaking or following commands, opens eyes to voice and noxious.  Cranial nerves:   II: visual fields are full to confrontation. pupils equally round and reactive to light,   III, IV, VI: EOMI   VII: slight left facial droop  Motor: Normal bulk and tone, moves right arm and leg spontaneously antigravity, left leg with trace foot movement, left arm 0/5  Sensation: Decreased left sided sensation  Coordination: did not cooperate  Reflexes: Upgoing left toe, equivocal right toes.   Gait: deferred          LABS:                        14.8   6.43  )-----------( 188      ( 2020 05:42 )             42.2     02-05    145  |  113<H>  |  20  ----------------------------<  111<H>  3.9   |  18<L>  |  1.16    Ca    8.9      2020 05:42  Phos  2.6     02-05  Mg     2.2     02-05        Urinalysis Basic - ( 2020 21:00 )    Color: Yellow / Appearance: Clear / S.015 / pH: x  Gluc: x / Ketone: 15 mg/dL  / Bili: Negative / Urobili: 1.0 E.U./dL   Blood: x / Protein: Trace mg/dL / Nitrite: NEGATIVE   Leuk Esterase: NEGATIVE / RBC: 5-10 /HPF / WBC < 5 /HPF   Sq Epi: x / Non Sq Epi: 0-5 /HPF / Bacteria: Present /HPF        RADIOLOGY & ADDITIONAL TESTS:  < from: MR Head No Cont (20 @ 21:15) >  Agree with the findings of a large right MCA territorial infarction involving the right frontal and temporal lobes and the right basal ganglia. There is mass effect on the right lateral ventricle without significant midline shift.    Evaluation for hemorrhage is severely limited due to extensive motion artifact. There is apparent susceptibility related signal loss within the right thalamus and corona radiata which may be artifactual due to motion. Continued follow-up is recommended.    Agree with the finding of a chronic left parietal infarction.    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 Second stroke code called when pt had intermittent jerking, vEEG negative, pt now extubated.   MRI done overnight showed acute right MCA infarct      1)Secondary stroke prevention  -ASA 81 and Plavix 75  -Atorvastatin 80    2) Stroke risk factors  -HTN, on amlodipine 5mg daily and cardene drip   -Previous CVA (baseline dysarthria and left-hand weakness)  -Still being worked up    3) Further workup   -Pt had severe reaction to benzos so there is a concern for safety of doing LOC as pt would likely receive versed during this procedure; however, LOC would be beneficial for stroke workup as it could potentially  if there is a clot in the left atrium  -PT/OT rec for AR  -SLP - pt currently with NG tube  -Fevers, respiratory per primary team    DVT prophylaxis   -Lovenox SQ and SCDs

## 2020-02-06 DIAGNOSIS — N39.490 OVERFLOW INCONTINENCE: ICD-10-CM

## 2020-02-06 DIAGNOSIS — I10 ESSENTIAL (PRIMARY) HYPERTENSION: ICD-10-CM

## 2020-02-06 DIAGNOSIS — F32.9 MAJOR DEPRESSIVE DISORDER, SINGLE EPISODE, UNSPECIFIED: ICD-10-CM

## 2020-02-06 DIAGNOSIS — I63.9 CEREBRAL INFARCTION, UNSPECIFIED: ICD-10-CM

## 2020-02-06 DIAGNOSIS — Z91.89 OTHER SPECIFIED PERSONAL RISK FACTORS, NOT ELSEWHERE CLASSIFIED: ICD-10-CM

## 2020-02-06 DIAGNOSIS — R63.8 OTHER SYMPTOMS AND SIGNS CONCERNING FOOD AND FLUID INTAKE: ICD-10-CM

## 2020-02-06 DIAGNOSIS — G93.41 METABOLIC ENCEPHALOPATHY: ICD-10-CM

## 2020-02-06 LAB
ANION GAP SERPL CALC-SCNC: 14 MMOL/L — SIGNIFICANT CHANGE UP (ref 5–17)
BUN SERPL-MCNC: 26 MG/DL — HIGH (ref 7–23)
CALCIUM SERPL-MCNC: 10 MG/DL — SIGNIFICANT CHANGE UP (ref 8.4–10.5)
CHLORIDE SERPL-SCNC: 114 MMOL/L — HIGH (ref 96–108)
CO2 SERPL-SCNC: 20 MMOL/L — LOW (ref 22–31)
CREAT SERPL-MCNC: 1.29 MG/DL — SIGNIFICANT CHANGE UP (ref 0.5–1.3)
CULTURE RESULTS: SIGNIFICANT CHANGE UP
CULTURE RESULTS: SIGNIFICANT CHANGE UP
GLUCOSE SERPL-MCNC: 129 MG/DL — HIGH (ref 70–99)
HCT VFR BLD CALC: 43.2 % — SIGNIFICANT CHANGE UP (ref 39–50)
HGB BLD-MCNC: 15 G/DL — SIGNIFICANT CHANGE UP (ref 13–17)
MAGNESIUM SERPL-MCNC: 2.3 MG/DL — SIGNIFICANT CHANGE UP (ref 1.6–2.6)
MCHC RBC-ENTMCNC: 32.5 PG — SIGNIFICANT CHANGE UP (ref 27–34)
MCHC RBC-ENTMCNC: 34.7 GM/DL — SIGNIFICANT CHANGE UP (ref 32–36)
MCV RBC AUTO: 93.5 FL — SIGNIFICANT CHANGE UP (ref 80–100)
NRBC # BLD: 0 /100 WBCS — SIGNIFICANT CHANGE UP (ref 0–0)
PLATELET # BLD AUTO: 187 K/UL — SIGNIFICANT CHANGE UP (ref 150–400)
POTASSIUM SERPL-MCNC: 4 MMOL/L — SIGNIFICANT CHANGE UP (ref 3.5–5.3)
POTASSIUM SERPL-SCNC: 4 MMOL/L — SIGNIFICANT CHANGE UP (ref 3.5–5.3)
RBC # BLD: 4.62 M/UL — SIGNIFICANT CHANGE UP (ref 4.2–5.8)
RBC # FLD: 12.9 % — SIGNIFICANT CHANGE UP (ref 10.3–14.5)
SODIUM SERPL-SCNC: 148 MMOL/L — HIGH (ref 135–145)
SPECIMEN SOURCE: SIGNIFICANT CHANGE UP
SPECIMEN SOURCE: SIGNIFICANT CHANGE UP
WBC # BLD: 5.46 K/UL — SIGNIFICANT CHANGE UP (ref 3.8–10.5)
WBC # FLD AUTO: 5.46 K/UL — SIGNIFICANT CHANGE UP (ref 3.8–10.5)

## 2020-02-06 PROCEDURE — 71045 X-RAY EXAM CHEST 1 VIEW: CPT | Mod: 26,77

## 2020-02-06 PROCEDURE — 99233 SBSQ HOSP IP/OBS HIGH 50: CPT | Mod: GC

## 2020-02-06 PROCEDURE — 99233 SBSQ HOSP IP/OBS HIGH 50: CPT

## 2020-02-06 PROCEDURE — 71045 X-RAY EXAM CHEST 1 VIEW: CPT | Mod: 26

## 2020-02-06 RX ORDER — NYSTATIN CREAM 100000 [USP'U]/G
1 CREAM TOPICAL
Refills: 0 | Status: DISCONTINUED | OUTPATIENT
Start: 2020-02-06 | End: 2020-02-18

## 2020-02-06 RX ADMIN — CHLORHEXIDINE GLUCONATE 1 APPLICATION(S): 213 SOLUTION TOPICAL at 06:46

## 2020-02-06 RX ADMIN — AMLODIPINE BESYLATE 10 MILLIGRAM(S): 2.5 TABLET ORAL at 11:19

## 2020-02-06 RX ADMIN — ENOXAPARIN SODIUM 40 MILLIGRAM(S): 100 INJECTION SUBCUTANEOUS at 22:28

## 2020-02-06 RX ADMIN — CLOPIDOGREL BISULFATE 75 MILLIGRAM(S): 75 TABLET, FILM COATED ORAL at 11:19

## 2020-02-06 RX ADMIN — Medication 81 MILLIGRAM(S): at 11:19

## 2020-02-06 RX ADMIN — CARVEDILOL PHOSPHATE 6.25 MILLIGRAM(S): 80 CAPSULE, EXTENDED RELEASE ORAL at 11:19

## 2020-02-06 RX ADMIN — NYSTATIN CREAM 1 APPLICATION(S): 100000 CREAM TOPICAL at 18:46

## 2020-02-06 RX ADMIN — LEVETIRACETAM 500 MILLIGRAM(S): 250 TABLET, FILM COATED ORAL at 11:19

## 2020-02-06 NOTE — PROGRESS NOTE ADULT - PROBLEM SELECTOR PLAN 1
#Metabolic Encephalopathy 2/2 stroke vs seizure (less likely):   Unclear etiology of events. CTH negative at [place] and [facility] (stroke code x2). s/p aborted thrombectomy with evidence of chronic ischemia. Repeat CT with evidence of contrast extravasation but no acute ischemic or hemorrhagic event. Patient with notable tremors and facial grimaces. VEEG showed no signs of seizure and MRI brain reveals R MCA infarct affecting the basal ganglia, frontal, and temporal lobes, likely etiology of metabolic encephalopathy.   - c/w Keppra 500mg BID  - minimize ativan and sedation  - epilepsy following, appreciate recs  - stroke team following appreciate recs  - PT recommending of acute rehab facility

## 2020-02-06 NOTE — PROGRESS NOTE ADULT - SUBJECTIVE AND OBJECTIVE BOX
Hospital Course from 7E to 7L:     64M with PMH of dementia, HTN and CVA (2 months ago) transferred from [facility] s/p possible stroke (left facial and eyelid drooling and speech slurring transferred for thrombectomy as patient outside window of tPA. CTH/brain and perfusion imaging negative for stroke on admission. Patient s/p aborted thrombectomy, due to likely chronic strokes, as posterior collaterals noted on angiogram. Patient requiring emergent re-intubation following thrombectomy for airway protection and concern for possible stroke in setting of left extremity tremor. Repeat stroke workup negative, with concern for seizure. Patient Keppra loaded, with initiation of Keppra 500mg BID. vEEG without evidence of seizure activity, however, will continue Keppra given repeated noted left sided tremor. Patient successfully extubated, however. non-arousable, somnolent, and lethargic - concern for stroke vs. seizure activity with resultant metabolic encephalopathy. Hospital course complicated by persistent hypertension, initially requiring nicardipine gtt, now since discontinued. Home amlodipine 5mg increased to 10mg, with initiation of enalapril and coreg and blood pressures have been <SBP goal of 180. Patient is medically optimized for step-down to [facility].         SUBJECTIVE / INTERVAL HPI: Patient seen and examined at bedside.     VITAL SIGNS:  Vital Signs Last 24 Hrs  T(C): 36.6 (06 Feb 2020 09:39), Max: 37.4 (05 Feb 2020 22:15)  T(F): 97.9 (06 Feb 2020 09:39), Max: 99.3 (05 Feb 2020 22:15)  HR: 72 (06 Feb 2020 12:21) (72 - 90)  BP: 127/81 (06 Feb 2020 12:21) (105/67 - 188/86)  BP(mean): 97 (06 Feb 2020 12:21) (79 - 132)  RR: 18 (06 Feb 2020 12:21) (16 - 28)  SpO2: 95% (06 Feb 2020 12:21) (95% - 98%)    REVIEW OF SYSTEMS:  Unable to obtain given mental status     PHYSICAL EXAM:  General: resting in bed; somnolent and lethargic  HEENT: NC/AT; PERRL, anicteric sclera, unable to test ocular movements   Neck: supple; no JVD; no cervical or submandibular lymphadenopathy   Cardiovascular: +S1/S2; RRR  Respiratory: CTA B/L; no W/R/R  Gastrointestinal:  soft, NT/ND; +BSx4, +NG tube in place   Extremities: WWP; no edema, clubbing or cyanosis  Vascular: 2+ radial, DP/PT pulses B/L  Neurological: AAOx0; agitated, but moving LE b/l, does not follow commands, spontaneously lifts RUE, does not track, Withdraws to pain but does not follow commands, but left-sided hemiplegia apparent.     MEDICATIONS:  MEDICATIONS  (STANDING):  amLODIPine   Tablet 10 milliGRAM(s) Oral every 24 hours  aspirin  chewable 81 milliGRAM(s) Oral daily  atorvastatin 80 milliGRAM(s) Oral at bedtime  carvedilol 6.25 milliGRAM(s) Oral every 12 hours  chlorhexidine 2% Cloths 1 Application(s) Topical <User Schedule>  clopidogrel Tablet 75 milliGRAM(s) Oral daily  doxazosin 4 milliGRAM(s) Oral at bedtime  enalapril 10 milliGRAM(s) Oral every 24 hours  enoxaparin Injectable 40 milliGRAM(s) SubCutaneous every 24 hours  levETIRAcetam  Solution 500 milliGRAM(s) Enteral Tube every 12 hours  nystatin Powder 1 Application(s) Topical two times a day    MEDICATIONS  (PRN):  acetaminophen    Suspension .. 650 milliGRAM(s) Oral every 8 hours PRN Temp greater or equal to 38C (100.4F)      ALLERGIES:  Allergies    Allergy Status Unknown    Intolerances        LABS:                        15.0   5.46  )-----------( 187      ( 06 Feb 2020 06:19 )             43.2     02-06    148<H>  |  114<H>  |  26<H>  ----------------------------<  129<H>  4.0   |  20<L>  |  1.29    Ca    10.0      06 Feb 2020 06:19  Phos  2.6     02-05  Mg     2.3     02-06          CAPILLARY BLOOD GLUCOSE          RADIOLOGY & ADDITIONAL TESTS: Reviewed.

## 2020-02-06 NOTE — PROGRESS NOTE ADULT - PROBLEM SELECTOR PLAN 2
History of CVA, Per medical records patient with CVA, 2 months ago. 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   - Patient now more alert at times, though still lethargic/minimally responsive. Vocalizing but nothing discernable. Moves LE b/l and RUE.    - c/w ASA 81mg PO daily and Plavix 75mg PO daily  - c/w Atorvastatin 80mg PO daily  - holding off on LOC for now given marginal respiratory status

## 2020-02-06 NOTE — PROGRESS NOTE ADULT - SUBJECTIVE AND OBJECTIVE BOX
Physical Medicine and Rehabilitation Progress Note:    Patient is a 64y old  Male who presents with a chief complaint of transfer from [facility] for stroke (06 Feb 2020 12:16)      HPI:  History obtained from [facility] medical record, as patient altered and subsequently intubated.     65 y/o male with PMH hypertension CVA (2 months ago at [facility]), dementia and depression transferred from [facility] with concern for stroke (MCA). Patient presented to [facility] after presenting to pharmacy with shirt and shoes on backwards and episode of falling on floor and speech slurring in pharmacy, found to have left sided facial drooping of eyelids, slurred speech, and expressive aphasia on presentation to [facility]. Last known normal unknown. Baseline mental status AAOx3 able to follow commands, completes ADLs independently.     Patient outside of window for tPA, transferred to [facility] for possible thrombectomy. At [facility], stroke code called on admission - imaging negative for acute infarct. Thrombectomy of R MCA attempted, however aborted as noted to have stump occlusion of mid right M1 with extensive JOSEPHINE collaterals - likely chronic occulusion. Patient extubated and upon presentation to MICU, patient noted to be altered with tongue rolling back, with contraction of left upper and lower extremity - patient intubated for airway protection. Second stroke code called, with repeat imaging without evidence of acute infarct. Patients admitted to MICU, intubated pending vEEG.      [facility]: Vitals: T: Afebrile | HR: 91-93 | BP: 147-159/ (MAP: ) | RR: 20-22 |   Labs: BMP: WNL | UA: WNL | BAL <10 | Lipid panel: ; Cholesterol 176 |   Interventions: CTH: No acute intracranial abnormality; chronic microvascular ischemic and volume changes - old posterior right frontal cortical infarct noted.   CT Perfusion Brain w/ contrast: Right MCA abnormal flow pattern consistent with ischemic change; no discrete evidence of infarct.     [facility]: Vitals: T: Afebrile | HR: 90 | BP: 208/123 | RR: 14  Labs: WBC 6.88; Hgb 13; Plt 144; Cr 1.21; Glucose 104  CT Brain Stroke (on arrival): Gyriform sites of cortical hyperdensity, presumed contrast enhancement, are favored to be subacute sites of infarction, superimposed on more chronic sites of right MCA infarction.  CT Brain Stroke (2nd stroke code, following aborted thrombectomy): Compared to prior study from earlier in the day, no significant change in scattered enhancement of the right frontal and posterior temporal lobes, likely secondary to subacute infarction  CT Perfusion: Abnormal perfusion with RAPID calculated mismatch volume of 44 ml and mismatch ratio is infinite within the right MCA territory.  CT Angio Head: Probable high-grade stenosis of the right M1 segment rather than occlusion. Multifocal areas of narrowing involving the right A1 and left M1 segment which may be secondary to intracranial atherosclerosis.  CT Angio Neck: Normal CTA neck.     Interventions: Intubated and sedated s/p thrombectomy, vEEG placed (31 Jan 2020 19:25)                            15.0   5.46  )-----------( 187      ( 06 Feb 2020 06:19 )             43.2       02-06    148<H>  |  114<H>  |  26<H>  ----------------------------<  129<H>  4.0   |  20<L>  |  1.29    Ca    10.0      06 Feb 2020 06:19  Phos  2.6     02-05  Mg     2.3     02-06      Vital Signs Last 24 Hrs  T(C): 36.6 (06 Feb 2020 13:44), Max: 37.4 (05 Feb 2020 22:15)  T(F): 97.8 (06 Feb 2020 13:44), Max: 99.3 (05 Feb 2020 22:15)  HR: 78 (06 Feb 2020 15:58) (72 - 90)  BP: 136/87 (06 Feb 2020 15:58) (105/67 - 188/86)  BP(mean): 107 (06 Feb 2020 15:58) (79 - 132)  RR: 18 (06 Feb 2020 15:58) (16 - 28)  SpO2: 97% (06 Feb 2020 15:58) (95% - 97%)    MEDICATIONS  (STANDING):  amLODIPine   Tablet 10 milliGRAM(s) Oral every 24 hours  aspirin  chewable 81 milliGRAM(s) Oral daily  atorvastatin 80 milliGRAM(s) Oral at bedtime  carvedilol 6.25 milliGRAM(s) Oral every 12 hours  chlorhexidine 2% Cloths 1 Application(s) Topical <User Schedule>  clopidogrel Tablet 75 milliGRAM(s) Oral daily  doxazosin 4 milliGRAM(s) Oral at bedtime  enalapril 10 milliGRAM(s) Oral every 24 hours  enoxaparin Injectable 40 milliGRAM(s) SubCutaneous every 24 hours  levETIRAcetam  Solution 500 milliGRAM(s) Enteral Tube every 12 hours  nystatin Powder 1 Application(s) Topical two times a day    MEDICATIONS  (PRN):  acetaminophen    Suspension .. 650 milliGRAM(s) Oral every 8 hours PRN Temp greater or equal to 38C (100.4F)    Currently Undergoing Physical and Occupational Therapy at bedside.    Functional Status Assessment:      Cognitive/Perceptual/Neuro  Level of Consciousness: lethargic  Arousal Level: arouses to repeated stimulation;  arouses to vigorous stimulation  Orientation: REINA  Speech: unable to speak  Mood/Behavior: calm    Muscular Strength Grading (Neuro)  Muscle Strength Grading LUE: 0 = no contraction  Muscle Strength Grading RUE: 2 = active movement with gravity eliminated  Muscle Strength Grading LLE: 0 = no contraction  Muscle Strength Grading RLE: 2 = active movement with gravity eliminated    Sensory Assessment  Pain Sensation Sensory Assessment: LUE:   RUE:   LLE:   RLE:   withdrawal to noxious stimuli presented to nailbed    Therapeutic Interventions      Bed Mobility  Bed Mobility Training Rolling/Turning: maximum assist (25% patient effort);  2 person assist;  nonverbal cues (demo/gestures);  verbal cues  Bed Mobility Training Scooting: maximum assist (25% patient effort);  2 person assist;  verbal cues;  nonverbal cues (demo/gestures)  Bed Mobility Training Sit-to-Supine: dependent (less than 25% patient effort);  2 person assist;  nonverbal cues (demo/gestures);  verbal cues  Bed Mobility Training Supine-to-Sit: dependent (less than 25% patient effort);  2 person assist;  nonverbal cues (demo/gestures);  verbal cues  Bed Mobility Training Limitations: decreased ability to use arms for pushing/pulling;  decreased ability to use legs for bridging/pushing;  impaired ability to control trunk for mobility;  decreased strength;  impaired balance;  abnormal muscle tone;  cognitive, decreased safety awareness;  impaired postural control    Therapeutic Exercise  Therapeutic Exercise Detail: PROM provided to all 4 extremities. Patient presenting with Left Upper Extremity elbow flexor tone and left wrist drop. Patient able to squeeze therapist's hand 1x and track therapist to left side 1x throughout session.     Functional Endurance  Functional Endurance Detail: Patient sat EOB for 10 minutes with maxAx1. Patient with poor postural control and no protective reactions noted.     OT Cognitive Treatment  OT Cognitive Treatment Treatment Detail: Patient responsive to sternal rub, repeated stimuli. Patient able to open eyes spontaneously, however only able to track therapist to left side 1x throughout session. Patient following <10% commands (squeeze hand).             PM&R Impression: as above, Left resting hand splint ordered    Current Disposition Plan Recommendations due to current mental status: dual plan acute/subacute rehab placement

## 2020-02-06 NOTE — PROGRESS NOTE ADULT - PROBLEM SELECTOR PLAN 4
#Overflow incontinence:   Patient with increase urinary output, concern for overflow incontinence.  - c/w Doxazosin 1mg 2mg qd at bedtime   - goldberg discontinued today   - failed TOV, so continue with bladder scans and straight cath q6

## 2020-02-06 NOTE — PROGRESS NOTE ADULT - PROBLEM SELECTOR PLAN 7
1.       PCP Contacted on Admission: (Y/N) --> Name & Phone #:  2.       Date of Contact with PCP:  3.       PCP Contacted at Discharge: (Y/N)  4.       Summary of Handoff Given to PCP:  5.       Post-Discharge Appointment Date and Location: [facility]

## 2020-02-06 NOTE — PROGRESS NOTE ADULT - PROBLEM SELECTOR PLAN 5
Per family, patient with history of depression. Previously on sertaline, now on trazadone 100mg.  - holding home meds in setting of lethargy

## 2020-02-06 NOTE — PROGRESS NOTE ADULT - PROBLEM SELECTOR PLAN 3
Patient with reported history of hypertension. Patient on amlodipine 5mg outpatient. BP on arrival SBP >200. BP difficult to control initially requiring cardipine gtt for management.   - c/w home amlodipine increased to 10mg qd  - c/w enalapril 10mg qd  - c/w Coreg 6.25mg BID   - may give labatelol 10mg IV push or Lasix 20mg IV push if continued episodes of HTN   - c/w doxazosin 2mg for BPH, however may contribute to resolving hypertension   - SBP goal <180

## 2020-02-06 NOTE — PROGRESS NOTE ADULT - SUBJECTIVE AND OBJECTIVE BOX
Neurology Stroke Progress Note    INTERVAL HPI/OVERNIGHT EVENTS:  Patient seen and examined at bedside. Not speaking or following commands.     MEDICATIONS  (STANDING):  amLODIPine   Tablet 10 milliGRAM(s) Oral every 24 hours  aspirin  chewable 81 milliGRAM(s) Oral daily  atorvastatin 80 milliGRAM(s) Oral at bedtime  carvedilol 6.25 milliGRAM(s) Oral every 12 hours  chlorhexidine 2% Cloths 1 Application(s) Topical <User Schedule>  clopidogrel Tablet 75 milliGRAM(s) Oral daily  doxazosin 4 milliGRAM(s) Oral at bedtime  enalapril 10 milliGRAM(s) Oral every 24 hours  enoxaparin Injectable 40 milliGRAM(s) SubCutaneous every 24 hours  levETIRAcetam  Solution 500 milliGRAM(s) Enteral Tube every 12 hours  nystatin Powder 1 Application(s) Topical two times a day    MEDICATIONS  (PRN):  acetaminophen    Suspension .. 650 milliGRAM(s) Oral every 8 hours PRN Temp greater or equal to 38C (100.4F)      Allergies  Allergy Status Unknown    ROS: As per HPI, otherwise negative    Vital Signs Last 24 Hrs  T(C): 36.6 (06 Feb 2020 13:44), Max: 37.4 (05 Feb 2020 22:15)  T(F): 97.8 (06 Feb 2020 13:44), Max: 99.3 (05 Feb 2020 22:15)  HR: 78 (06 Feb 2020 15:58) (72 - 90)  BP: 136/87 (06 Feb 2020 15:58) (105/67 - 188/86)  BP(mean): 107 (06 Feb 2020 15:58) (79 - 132)  RR: 18 (06 Feb 2020 15:58) (16 - 27)  SpO2: 97% (06 Feb 2020 15:58) (95% - 97%)    Physical exam:  General: Lethargic, in no acute distress.    Neurologic:  Mental status: Lethargic, not speaking. Opens eyes to repeated stimuli. Does not follows commands.     Cranial Nerves:  II: Visual fields are full to confrontation. Pupils equally round and reactive to light b/l.   III, IV, VI: Does not track with eyes.   VII: No facial droop, face symmetric with normal eye closure and smile.  Motor: Moves right arm and leg spontaneously antigravity, 3/5. Left arm contracted, no movement. Trace movement of left leg, 1/5.    Sensation: Decreased sensation of left-arm, left leg withdraws to pain.   Coordination: Unable to cooperate.   Reflexes: L upgoing toe, R equivocal toe.   Gait: Deferred.       LABS:                        15.0   5.46  )-----------( 187      ( 06 Feb 2020 06:19 )             43.2     02-06    148<H>  |  114<H>  |  26<H>  ----------------------------<  129<H>  4.0   |  20<L>  |  1.29    Ca    10.0      06 Feb 2020 06:19  Phos  2.6     02-05  Mg     2.3     02-06      RADIOLOGY & ADDITIONAL TESTS:  - MRI brain   < from: MR Head No Cont (02.04.20 @ 21:15) >  IMPRESSION: Limited study due to motion artifact, however there is right MCA territory infarct involving the basal ganglia, frontal lobe and temporal lobe as described above. There is also chronic encephalomalacia seen in the left parietal lobe, likely from prior infarction. Small vessel ischemic disease.      Assessment and Plan  64M with PMHx of HTN, CVA (residual dysarthria and left-hand weakness), and anxiety disorder who presented with an acute R MCA stroke with perfusion defect on CT scan s/p attempted mechanical thrombectomy (aborted due to 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 Second stroke code called for intermittent jerking and extremity posturing. Repeat CTH unchanged, vEEG negative. Patient currently extubated 2/2/2020, but now lethargic and not following commands. MRI brain revealed right MCA infarct. Will continue to follow on MICU - further workup needed, patient's symptoms could be consistent with acute stroke vs metabolic encephalopathy vs seizures.      1)Secondary stroke prevention  -Continue ASA 81mg PO daily and Plavix 75mg PO daily   -Continue Atorvastatin 80mg PO daily     2) Stroke risk factors  -HTN - c/w home Amlodipine   -Prev CVA (baseline dysarthria and left hand weakness)    3) Further workup   -Due to severe reaction to benzos, there is a concern for safety of doing LOC as patient would likely receive versed during this procedure; however, LOC would be beneficial for stroke workup as it could potentially  if there is a clot in the left atrium.   - Continue Keppra 500mg BID  - PT/OT rec for AR    DVT prophylaxis   -Heparin SQ TID and SCDs

## 2020-02-06 NOTE — PROGRESS NOTE ADULT - ASSESSMENT
63 y/o M with PMHx of HTN and CVA (2 months ago) transferred from [facility] s/p stroke (left facial and eyelid drooling and speech slurring presenting for possible thrombectomy, as patient outside window for tPA - c/b reintubation following thrombectomy, now with metabolic encephalopathy, concern for seizure vs acute stroke.     HEMODYNAMICS:  Patient hyperdynamic (tachycardic and EF 75% demonstrated on echocardiogram).   Euvolemic on exam - intubated, unable to assess mental status, making urine, extremities warm and well perfused, appropriate urine output.    NEURO  #Metabolic Encephalopathy 2/2 stroke vs seizure (less likely):   Unclear etiology of events. CTH negative at [place] and [facility] (stroke code x2). s/p aborted thrombectomy with evidence of chronic ischemia. Repeat CT with evidence of contrast extravasation but no acute ischemic or hemorrhagic event. Patient with notable tremors and facial grimaces. VEEG showed no signs of seizure and MRI brain reveals R MCA infarct affecting the basal ganglia, frontal, and temporal lobes, likely etiology of metabolic encephalopathy.   - s/p 1g Keppra load, c/w Keppra 500mg BID for now but discuss with Dr. Tesfaye whether or not this is necessary given VEEG shows no epileptiform activity    - minimize ativan and sedation  - epilepsy following, appreciate recs  - stroke team following appreciate recs  - PT recommending of acute rehab facility     #Intubated RESOLVED  Patient s/p emergent intubation for airway protection.  - s/p extubation 2/2/20, saturating well on RA    #Hx of CVA:   Per medical records patient with CVA, 2 months ago. 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   - Patient now more alert at times, though still lethargic/minimally responsive. Vocalizing but nothing discernable. Moves LE b/l and RUE.    - c/w ASA 81mg PO daily and Plavix 75mg PO daily  - c/w Atorvastatin 80mg PO daily  - holding off on LOC for now given marginal respiratory status      CARDIO  #Hypertension:   Patient with reported history of hypertension. Patient on amlodipine 5mg outpatient. BP on arrival SBP >200. BP difficult to control initially requiring cardipine gtt for management. Now off ggt and BP at goal (SBP<180), so no need for additional anti-hypertensive medications at this time.   - c/w home amlodipine increased to 10mg qd  - c/w enalapril 10mg qd  - c/w Coreg 6.25mg BID   - may give labatelol 10mg IV push or Lasix 20mg IV push if continued episodes of HTN   - c/w doxazosin 2mg for BPH, however may contribute to resolving hypertension   - SBP goal <180  - continue to monitor BPs  - consider secondary causes of malignant hypertension     ID:  #Episodic fever:   Patient with new fever, Tmax 101. No evidence of infection - no leukocytosis, UA negative, CXR without evidence of infiltrate, no evidence of cellulitis, blood cultures NGTD  - f/u blood cultures, so far NGTD   - will not treat infection at this time, possibly central fever in setting of stroke  - continue to monitor     :   #Overflow incontinence:   Patient with increase urinary output, concern for overflow incontinence.  - c/w Doxazosin 1mg 2mg qd at bedtime   - goldberg discontinued today   - failed TOV, so continue with bladder scans and straight cath q6    DERM:   Areas of erythema and irritation on L and R medial thighs.   - ordered Nystatin powder     PSYCH:   #Depression  Per family, patient with history of depression. Previously on sertaline, now on trazadone 100mg.  - holding home meds in setting of lethargy    PROPHYLACTIC MEASURE  F: none   E: replete to K>4, Mg>2  N: c/w Jevity 1.2 10mL/hour     VTE Prophylaxis: Lovenox SubQ  C: FULL  D: RMF     DISPO: Acute rehab facility 63 y/o M with PMHx of HTN and CVA (2 months ago) transferred from [facility] s/p stroke (left facial and eyelid drooling and speech slurring presenting for possible thrombectomy, as patient outside window for tPA - c/b reintubation following thrombectomy, now with metabolic encephalopathy, concern for seizure vs acute stroke.     HEMODYNAMICS:  Patient hyperdynamic (tachycardic and EF 75% demonstrated on echocardiogram).   Euvolemic on exam -  mental status compromised by CVA; extremities warm and well perfused, appropriate urine output.    NEURO  #Metabolic Encephalopathy 2/2 stroke vs seizure (less likely):   Unclear etiology of events. CTH negative at [place] and [facility] (stroke code x2). s/p aborted thrombectomy with evidence of chronic ischemia. Repeat CT with evidence of contrast extravasation but no acute ischemic or hemorrhagic event. Patient with notable tremors and facial grimaces. VEEG showed no signs of seizure and MRI brain reveals R MCA infarct affecting the basal ganglia, frontal, and temporal lobes, likely etiology of metabolic encephalopathy.   - s/p 1g Keppra load, c/w Keppra 500mg BID for now but discuss with Dr. Tesfaye whether or not this is necessary given VEEG shows no epileptiform activity    - minimize ativan and sedation  - epilepsy following, appreciate recs  - stroke team following appreciate recs  - PT recommending of acute rehab facility     #Intubated RESOLVED  Patient s/p emergent intubation for airway protection.  - s/p extubation 2/2/20, saturating well on RA    #Hx of CVA:   Per medical records patient with CVA, 2 months ago. 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   - Patient now more alert at times, though still lethargic/minimally responsive. Vocalizing but nothing discernable. Moves LE b/l and RUE.    - c/w ASA 81mg PO daily and Plavix 75mg PO daily  - c/w Atorvastatin 80mg PO daily  - holding off on LOC for now given marginal respiratory status      CARDIO  #Hypertension:   Patient with reported history of hypertension. Patient on amlodipine 5mg outpatient. BP on arrival SBP >200. BP difficult to control initially requiring cardipine gtt for management. Now off ggt and BP at goal (SBP<180), so no need for additional anti-hypertensive medications at this time.   - c/w home amlodipine increased to 10mg qd  - c/w enalapril 10mg qd  - c/w Coreg 6.25mg BID   - may give labatelol 10mg IV push or Lasix 20mg IV push if continued episodes of HTN   - c/w doxazosin 2mg for BPH, however may contribute to resolving hypertension   - SBP goal <180  - continue to monitor BPs  - consider secondary causes of malignant hypertension     ID:  #Episodic fever:   Patient with new fever, Tmax 101 but lower last 48 hours. No evidence of infection - no leukocytosis, UA negative, CXR without evidence of infiltrate, no evidence of cellulitis, blood cultures NGTD  - f/u blood cultures, so far NGTD   - will not treat infection at this time  - continue to monitor     :   #Overflow incontinence:   Patient with increase urinary output, concern for overflow incontinence.  - c/w Doxazosin 4mg qd at bedtime   - goldberg discontinued yesterday   - failed TOV, so continue with bladder scans and straight cath q6    DERM:   Areas of erythema and irritation on L and R medial thighs.   - ordered Nystatin powder     PSYCH:   #Depression  Per family, patient with history of depression. Previously on sertaline, now on trazadone 100mg.  - holding home meds in setting of lethargy    PROPHYLACTIC MEASURE  F: none   E: replete to K>4, Mg>2  N: c/w Jevity 1.2 10mL/hour     VTE Prophylaxis: Lovenox SubQ  C: FULL  D: RMF     DISPO: Acute rehab facility 63 y/o M with PMHx of HTN and CVA (2 months ago) transferred from [facility] s/p stroke (left facial and eyelid drooling and speech slurring presenting for possible thrombectomy, as patient outside window for tPA - c/b reintubation following thrombectomy, now with metabolic encephalopathy, concern for seizure vs acute stroke.     HEMODYNAMICS:  Patient hyperdynamic (tachycardic and EF 75% demonstrated on echocardiogram).   Euvolemic on exam -  mental status compromised by CVA; extremities warm and well perfused, appropriate urine output.    NEURO  #Metabolic Encephalopathy 2/2 stroke vs seizure (less likely):   Unclear etiology of events. CTH negative at [place] and [facility] (stroke code x2). s/p aborted thrombectomy with evidence of chronic ischemia. Repeat CT with evidence of contrast extravasation but no acute ischemic or hemorrhagic event. Patient with notable tremors and facial grimaces. VEEG showed no signs of seizure and MRI brain reveals R MCA infarct affecting the basal ganglia, frontal, and temporal lobes, likely etiology of metabolic encephalopathy.   - s/p 1g Keppra load, c/w Keppra 500mg BID for now but discuss with Dr. Tesfaye whether or not this is necessary given VEEG shows no epileptiform activity    - minimize ativan and sedation  - epilepsy following, appreciate recs  - stroke team following appreciate recs  - PT recommending of acute rehab facility     #Intubated RESOLVED  Patient s/p emergent intubation for airway protection.  - s/p extubation 2/2/20, saturating well on RA    #Hx of CVA:   Per medical records patient with CVA, 2 months ago. 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   - Patient now more alert at times, though still lethargic/minimally responsive. Vocalizing but nothing discernable. Moves LE b/l and RUE.    - c/w ASA 81mg PO daily and Plavix 75mg PO daily  - c/w Atorvastatin 80mg PO daily  - holding off on LOC for now given marginal respiratory status      CARDIO  #Hypertension:   Patient with reported history of hypertension. Patient on amlodipine 5mg outpatient. BP on arrival SBP >200. BP difficult to control initially requiring cardipine gtt for management. Now off ggt and BP at goal (SBP<180), so no need for additional anti-hypertensive medications at this time.   - c/w home amlodipine increased to 10mg qd  - c/w enalapril 10mg qd  - c/w Coreg 6.25mg BID   - may give labatelol 10mg IV push or Lasix 20mg IV push if continued episodes of HTN   - c/w doxazosin 2mg for BPH, however may contribute to resolving hypertension   - SBP goal <180  - continue to monitor BPs  - consider secondary causes of malignant hypertension     ID:  #Episodic fever:   Patient with new fever, Tmax 101 but lower last 48 hours. No evidence of infection - no leukocytosis, UA negative, CXR without evidence of infiltrate, no evidence of cellulitis, blood cultures NGTD  - f/u blood cultures, so far NGTD   - will not treat infection at this time  - continue to monitor     :   #Overflow incontinence:   Patient with increase urinary output, concern for overflow incontinence.  - c/w Doxazosin 4mg qd at bedtime   - goldberg discontinued yesterday   - failed TOV, so continue with bladder scans and straight cath q6    DERM:   Areas of erythema and irritation on L and R medial thighs.   - ordered Nystatin powder     PSYCH:   #Depression  Per family, patient with history of depression. Previously on sertaline, now on trazadone 100mg.  - holding home meds in setting of lethargy    PROPHYLACTIC MEASURE  F: none   E: replete to K>4, Mg>2  N: c/w Jevity 1.2 65mL/hour     VTE Prophylaxis: Lovenox SubQ  C: FULL  D: RMF     DISPO: Acute rehab facility

## 2020-02-06 NOTE — PROGRESS NOTE ADULT - ASSESSMENT
65 y/o M with PMHx of HTN and CVA (2 months ago) transferred from [facility] s/p stroke (left facial and eyelid drooling and speech slurring presenting for possible thrombectomy, as patient outside window for tPA - c/b reintubation following thrombectomy, now with metabolic encephalopathy, concern for seizure vs acute stroke.     HEMODYNAMICS:  Patient hyperdynamic (tachycardic and EF 75% demonstrated on echocardiogram).   Euvolemic on exam - intubated, unable to assess mental status, making urine, extremities warm and well perfused, appropriate urine output.    NEURO              ID:  #Episodic fever:   Patient with new fever, Tmax 101. No evidence of infection - no leukocytosis, UA negative, CXR without evidence of infiltrate, no evidence of cellulitis, blood cultures NGTD  - f/u blood cultures, so far NGTD   - will not treat infection at this time, possibly central fever in setting of stroke  - continue to monitor     :   #Overflow incontinence:   Patient with increase urinary output, concern for overflow incontinence.  - c/w Doxazosin 1mg 2mg qd at bedtime   - goldberg discontinued today   - failed TOV, so continue with bladder scans and straight cath q6    DERM:   Areas of erythema and irritation on L and R medial thighs.   - ordered Nystatin powder     PSYCH:   #Depression  Per family, patient with history of depression. Previously on sertaline, now on trazadone 100mg.  - holding home meds in setting of lethargy    PROPHYLACTIC MEASURE  F: none   E: replete to K>4, Mg>2  N: c/w Jevity 1.2 10mL/hour     VTE Prophylaxis: Lovenox SubQ  C: FULL  D: RMF     DISPO: Acute rehab facility 63 y/o M with PMHx of HTN and CVA (2 months ago) transferred from [facility] s/p stroke (left facial and eyelid drooling and speech slurring presenting for possible thrombectomy, as patient outside window for tPA - c/b reintubation following thrombectomy, now with metabolic encephalopathy, concern for seizure vs acute stroke.

## 2020-02-06 NOTE — PROGRESS NOTE ADULT - SUBJECTIVE AND OBJECTIVE BOX
HOSPITAL COURSE:   64M with PMH of dementia, HTN and CVA (2 months ago) transferred from [facility] s/p possible stroke (left facial and eyelid drooling and speech slurring transferred for thrombectomy as patient outside window of tPA. CTH/brain and perfusion imaging negative for stroke on admission. Patient s/p aborted thrombectomy, due to likely chronic strokes, as posterior collaterals noted on angiogram. Patient requiring emergent re-intubation following thrombectomy for airway protection and concern for possible stroke in setting of left extremity tremor. Repeat stroke workup negative, with concern for seizure. Patient Keppra loaded, with initiation of Keppra 500mg BID. vEEG without evidence of seizure activity, however, will continue Keppra given repeated noted left sided tremor. Patient successfully extubated, however. non-arousable, somnolent, and lethargic - concern for stroke vs. seizure activity with resultant metabolic encephalopathy. Hospital course complicated by persistent hypertension, initially requiring nicardipine gtt, now since discontinued. Home amlodipine 5mg increased to 10mg, with initiation of enalapril and coreg and blood pressures have been <SBP goal of 180. Patient is medically optimized for step-down to [facility].     OVERNIGHT EVENTS:  Overnight, pulled out NG tube, was replaced. Straight cathed 2x     SUBJECTIVE / INTERVAL HPI:   Patient seen and examined at bedside. Unable to obtain full review of systems but appears comfortable. Lethargic this AM but often vocalizing, moving LE intermittently.     VITAL SIGNS:  Vital Signs Last 24 Hrs  T(C): 36.6 (06 Feb 2020 09:39), Max: 37.4 (05 Feb 2020 22:15)  T(F): 97.9 (06 Feb 2020 09:39), Max: 99.3 (05 Feb 2020 22:15)  HR: 78 (06 Feb 2020 11:00) (77 - 93)  BP: 126/72 (06 Feb 2020 11:00) (105/67 - 188/86)  BP(mean): 92 (06 Feb 2020 11:00) (79 - 132)  RR: 18 (06 Feb 2020 11:00) (16 - 29)  SpO2: 95% (06 Feb 2020 11:00) (95% - 98%)    02-05-20 @ 07:01  -  02-06-20 @ 07:00  --------------------------------------------------------  IN: 735 mL / OUT: 1570 mL / NET: -835 mL    02-06-20 @ 07:01  -  02-06-20 @ 12:16  --------------------------------------------------------  IN: 115 mL / OUT: 200 mL / NET: -85 mL      PHYSICAL EXAM:  General:  male resting in bed; somnolent and lethargic, vocalizing but indiscernible   HEENT: NC/AT; PERRL, anicteric sclera, unable to test ocular movements   Neck: supple; no JVD; no cervical or submandibular lymphadenopathy   Cardiovascular: +S1/S2; RRR  Respiratory: CTA B/L; no W/R/R  Gastrointestinal: overly nourished; soft, NT/ND; +BSx4, +NG tube in place   Genitourinary: goldberg now removed, rash on inner R and L thigh   Extremities: WWP; no edema, clubbing or cyanosis  Vascular: 2+ radial, DP/PT pulses B/L  Neurological: AAOx0; notably agitated, but moving LE b/l, does not follow commands with intention but lifts LUE off bed, open eyes in response to name but does not track       MEDICATIONS:  MEDICATIONS  (STANDING):  amLODIPine   Tablet 10 milliGRAM(s) Oral every 24 hours  aspirin  chewable 81 milliGRAM(s) Oral daily  atorvastatin 80 milliGRAM(s) Oral at bedtime  carvedilol 6.25 milliGRAM(s) Oral every 12 hours  chlorhexidine 2% Cloths 1 Application(s) Topical <User Schedule>  clopidogrel Tablet 75 milliGRAM(s) Oral daily  doxazosin 4 milliGRAM(s) Oral at bedtime  enalapril 10 milliGRAM(s) Oral every 24 hours  enoxaparin Injectable 40 milliGRAM(s) SubCutaneous every 24 hours  levETIRAcetam  Solution 500 milliGRAM(s) Enteral Tube every 12 hours  nystatin Powder 1 Application(s) Topical two times a day    MEDICATIONS  (PRN):  acetaminophen    Suspension .. 650 milliGRAM(s) Oral every 8 hours PRN Temp greater or equal to 38C (100.4F)      ALLERGIES:  Allergies    Allergy Status Unknown    Intolerances        LABS:                        15.0   5.46  )-----------( 187      ( 06 Feb 2020 06:19 )             43.2     02-06    148<H>  |  114<H>  |  26<H>  ----------------------------<  129<H>  4.0   |  20<L>  |  1.29    Ca    10.0      06 Feb 2020 06:19  Phos  2.6     02-05  Mg     2.3     02-06          CAPILLARY BLOOD GLUCOSE            RADIOLOGY & ADDITIONAL TESTS:   Reviewed. HOSPITAL COURSE:   64M with PMH of dementia, HTN and CVA (2 months ago) transferred from [facility] s/p possible stroke (left facial and eyelid drooling and speech slurring transferred for thrombectomy as patient outside window of tPA. CTH/brain and perfusion imaging negative for stroke on admission. Patient s/p aborted thrombectomy, due to likely chronic strokes, as posterior collaterals noted on angiogram. Patient requiring emergent re-intubation following thrombectomy for airway protection and concern for possible stroke in setting of left extremity tremor. Repeat stroke workup negative, with concern for seizure. Patient Keppra loaded, with initiation of Keppra 500mg BID. vEEG without evidence of seizure activity, however, will continue Keppra given repeated noted left sided tremor. Patient successfully extubated, however. non-arousable, somnolent, and lethargic - concern for stroke vs. seizure activity with resultant metabolic encephalopathy. Hospital course complicated by persistent hypertension, initially requiring nicardipine gtt, now since discontinued. Home amlodipine 5mg increased to 10mg, with initiation of enalapril and coreg and blood pressures have been <SBP goal of 180. Patient is medically optimized for step-down to [facility].     OVERNIGHT EVENTS:  Overnight, pulled out NG tube, was replaced. Straight cathed 2x     SUBJECTIVE / INTERVAL HPI:   Patient seen and examined at bedside. Unable to obtain full review of systems but appears comfortable. Lethargic this AM but often vocalizing, moving LE intermittently.     VITAL SIGNS:  Vital Signs Last 24 Hrs  T(C): 36.6 (06 Feb 2020 09:39), Max: 37.4 (05 Feb 2020 22:15)  T(F): 97.9 (06 Feb 2020 09:39), Max: 99.3 (05 Feb 2020 22:15)  HR: 78 (06 Feb 2020 11:00) (77 - 93)  BP: 126/72 (06 Feb 2020 11:00) (105/67 - 188/86)  BP(mean): 92 (06 Feb 2020 11:00) (79 - 132)  RR: 18 (06 Feb 2020 11:00) (16 - 29)  SpO2: 95% (06 Feb 2020 11:00) (95% - 98%)    02-05-20 @ 07:01  -  02-06-20 @ 07:00  --------------------------------------------------------  IN: 735 mL / OUT: 1570 mL / NET: -835 mL    02-06-20 @ 07:01  -  02-06-20 @ 12:16  --------------------------------------------------------  IN: 115 mL / OUT: 200 mL / NET: -85 mL      PHYSICAL EXAM:  General:  male resting in bed; somnolent and lethargic, vocalizing but indiscernible   HEENT: NC/AT; PERRL, anicteric sclera, unable to test ocular movements   Neck: supple; no JVD; no cervical or submandibular lymphadenopathy   Cardiovascular: +S1/S2; RRR  Respiratory: CTA B/L; no W/R/R  Gastrointestinal: overly nourished; soft, NT/ND; +BSx4, +NG tube in place   Genitourinary: goldberg now removed, rash on inner R and L thigh   Extremities: WWP; no edema, clubbing or cyanosis  Vascular: 2+ radial, DP/PT pulses B/L  Neurological: AAOx0; notably agitated, but moving LE b/l, does not follow commands with intention but lifts RUE off bed, open eyes in response to name but does not track. Withdraws to pain, unable to assess extremity strength, as does not follow commands, but left-sided hemiplegia apparent.       MEDICATIONS:  MEDICATIONS  (STANDING):  amLODIPine   Tablet 10 milliGRAM(s) Oral every 24 hours  aspirin  chewable 81 milliGRAM(s) Oral daily  atorvastatin 80 milliGRAM(s) Oral at bedtime  carvedilol 6.25 milliGRAM(s) Oral every 12 hours  chlorhexidine 2% Cloths 1 Application(s) Topical <User Schedule>  clopidogrel Tablet 75 milliGRAM(s) Oral daily  doxazosin 4 milliGRAM(s) Oral at bedtime  enalapril 10 milliGRAM(s) Oral every 24 hours  enoxaparin Injectable 40 milliGRAM(s) SubCutaneous every 24 hours  levETIRAcetam  Solution 500 milliGRAM(s) Enteral Tube every 12 hours  nystatin Powder 1 Application(s) Topical two times a day    MEDICATIONS  (PRN):  acetaminophen    Suspension .. 650 milliGRAM(s) Oral every 8 hours PRN Temp greater or equal to 38C (100.4F)      ALLERGIES:  Allergies    Allergy Status Unknown    Intolerances        LABS:                        15.0   5.46  )-----------( 187      ( 06 Feb 2020 06:19 )             43.2     02-06    148<H>  |  114<H>  |  26<H>  ----------------------------<  129<H>  4.0   |  20<L>  |  1.29    Ca    10.0      06 Feb 2020 06:19  Phos  2.6     02-05  Mg     2.3     02-06          CAPILLARY BLOOD GLUCOSE            RADIOLOGY & ADDITIONAL TESTS:   Reviewed.

## 2020-02-06 NOTE — PROGRESS NOTE ADULT - ASSESSMENT
per Neurology    63 y/o M with PMHx of HTN and CVA (2 months ago) transferred from [facility] s/p stroke (left facial and eyelid drooling and speech slurring presenting for possible thrombectomy, as patient outside window for tPA - c/b reintubation following thrombectomy, now with metabolic encephalopathy, concern for seizure vs acute stroke.         Problem/Plan - 1:  ·  Problem: Metabolic encephalopathy.  Plan: #Metabolic Encephalopathy 2/2 stroke vs seizure (less likely):   Unclear etiology of events. CTH negative at [facility] (stroke code x2). s/p aborted thrombectomy with evidence of chronic ischemia. Repeat CT with evidence of contrast extravasation but no acute ischemic or hemorrhagic event. Patient with notable tremors and facial grimaces. VEEG showed no signs of seizure and MRI brain reveals R MCA infarct affecting the basal ganglia, frontal, and temporal lobes, likely etiology of metabolic encephalopathy.   - c/w Keppra 500mg BID  - minimize ativan and sedation  - epilepsy following, appreciate recs  - stroke team following appreciate recs  - PT recommending of acute rehab facility.     Problem/Plan - 2:  ·  Problem: Cerebrovascular accident (CVA), unspecified mechanism.  Plan: History of CVA, Per medical records patient with CVA, 2 months ago. 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   - Patient now more alert at times, though still lethargic/minimally responsive. Vocalizing but nothing discernable. Moves LE b/l and RUE.    - c/w ASA 81mg PO daily and Plavix 75mg PO daily  - c/w Atorvastatin 80mg PO daily  - holding off on LOC for now given marginal respiratory status.     Problem/Plan - 3:  ·  Problem: Hypertension, unspecified type.  Plan: Patient with reported history of hypertension. Patient on amlodipine 5mg outpatient. BP on arrival SBP >200. BP difficult to control initially requiring cardipine gtt for management.   - c/w home amlodipine increased to 10mg qd  - c/w enalapril 10mg qd  - c/w Coreg 6.25mg BID   - may give labatelol 10mg IV push or Lasix 20mg IV push if continued episodes of HTN   - c/w doxazosin 2mg for BPH, however may contribute to resolving hypertension   - SBP goal <180.     Problem/Plan - 4:  ·  Problem: Overflow incontinence.  Plan: #Overflow incontinence:   Patient with increase urinary output, concern for overflow incontinence.  - c/w Doxazosin 1mg 2mg qd at bedtime   - goldberg discontinued today   - failed TOV, so continue with bladder scans and straight cath q6.     Problem/Plan - 5:  ·  Problem: Depression, unspecified depression type.  Plan: Per family, patient with history of depression. Previously on sertaline, now on trazadone 100mg.  - holding home meds in setting of lethargy.     Problem/Plan - 6:  Problem: Nutrition, metabolism, and development symptoms. Plan: F: No fluids   E: Replete K < 4.0, and Mg <2.0   N: Jevity  SCD: Lovenox.

## 2020-02-07 DIAGNOSIS — I10 ESSENTIAL (PRIMARY) HYPERTENSION: ICD-10-CM

## 2020-02-07 DIAGNOSIS — Z51.5 ENCOUNTER FOR PALLIATIVE CARE: ICD-10-CM

## 2020-02-07 DIAGNOSIS — Z71.89 OTHER SPECIFIED COUNSELING: ICD-10-CM

## 2020-02-07 DIAGNOSIS — N17.9 ACUTE KIDNEY FAILURE, UNSPECIFIED: ICD-10-CM

## 2020-02-07 DIAGNOSIS — E87.0 HYPEROSMOLALITY AND HYPERNATREMIA: ICD-10-CM

## 2020-02-07 LAB
ANION GAP SERPL CALC-SCNC: 13 MMOL/L — SIGNIFICANT CHANGE UP (ref 5–17)
BUN SERPL-MCNC: 26 MG/DL — HIGH (ref 7–23)
CALCIUM SERPL-MCNC: 9.7 MG/DL — SIGNIFICANT CHANGE UP (ref 8.4–10.5)
CHLORIDE SERPL-SCNC: 113 MMOL/L — HIGH (ref 96–108)
CO2 SERPL-SCNC: 25 MMOL/L — SIGNIFICANT CHANGE UP (ref 22–31)
CREAT ?TM UR-MCNC: 247 MG/DL — SIGNIFICANT CHANGE UP
CREAT SERPL-MCNC: 1.42 MG/DL — HIGH (ref 0.5–1.3)
GLUCOSE SERPL-MCNC: 123 MG/DL — HIGH (ref 70–99)
HCT VFR BLD CALC: 44.8 % — SIGNIFICANT CHANGE UP (ref 39–50)
HGB BLD-MCNC: 15.1 G/DL — SIGNIFICANT CHANGE UP (ref 13–17)
MAGNESIUM SERPL-MCNC: 2.3 MG/DL — SIGNIFICANT CHANGE UP (ref 1.6–2.6)
MCHC RBC-ENTMCNC: 32.4 PG — SIGNIFICANT CHANGE UP (ref 27–34)
MCHC RBC-ENTMCNC: 33.7 GM/DL — SIGNIFICANT CHANGE UP (ref 32–36)
MCV RBC AUTO: 96.1 FL — SIGNIFICANT CHANGE UP (ref 80–100)
NRBC # BLD: 0 /100 WBCS — SIGNIFICANT CHANGE UP (ref 0–0)
OSMOLALITY UR: 774 MOSMOL/KG — HIGH (ref 100–650)
PLATELET # BLD AUTO: 188 K/UL — SIGNIFICANT CHANGE UP (ref 150–400)
POTASSIUM SERPL-MCNC: 3.9 MMOL/L — SIGNIFICANT CHANGE UP (ref 3.5–5.3)
POTASSIUM SERPL-SCNC: 3.9 MMOL/L — SIGNIFICANT CHANGE UP (ref 3.5–5.3)
RBC # BLD: 4.66 M/UL — SIGNIFICANT CHANGE UP (ref 4.2–5.8)
RBC # FLD: 13.1 % — SIGNIFICANT CHANGE UP (ref 10.3–14.5)
SODIUM SERPL-SCNC: 151 MMOL/L — HIGH (ref 135–145)
SODIUM UR-SCNC: 39 MMOL/L — SIGNIFICANT CHANGE UP
WBC # BLD: 6.48 K/UL — SIGNIFICANT CHANGE UP (ref 3.8–10.5)
WBC # FLD AUTO: 6.48 K/UL — SIGNIFICANT CHANGE UP (ref 3.8–10.5)

## 2020-02-07 PROCEDURE — 99223 1ST HOSP IP/OBS HIGH 75: CPT | Mod: GC

## 2020-02-07 PROCEDURE — 99233 SBSQ HOSP IP/OBS HIGH 50: CPT

## 2020-02-07 PROCEDURE — 99358 PROLONG SERVICE W/O CONTACT: CPT

## 2020-02-07 PROCEDURE — 99233 SBSQ HOSP IP/OBS HIGH 50: CPT | Mod: GC

## 2020-02-07 RX ORDER — MODAFINIL 200 MG/1
100 TABLET ORAL EVERY 24 HOURS
Refills: 0 | Status: DISCONTINUED | OUTPATIENT
Start: 2020-02-08 | End: 2020-02-08

## 2020-02-07 RX ADMIN — ATORVASTATIN CALCIUM 80 MILLIGRAM(S): 80 TABLET, FILM COATED ORAL at 21:48

## 2020-02-07 RX ADMIN — Medication 10 MILLIGRAM(S): at 18:45

## 2020-02-07 RX ADMIN — LEVETIRACETAM 500 MILLIGRAM(S): 250 TABLET, FILM COATED ORAL at 21:48

## 2020-02-07 RX ADMIN — Medication 10 MILLIGRAM(S): at 00:01

## 2020-02-07 RX ADMIN — Medication 81 MILLIGRAM(S): at 11:31

## 2020-02-07 RX ADMIN — CARVEDILOL PHOSPHATE 6.25 MILLIGRAM(S): 80 CAPSULE, EXTENDED RELEASE ORAL at 00:01

## 2020-02-07 RX ADMIN — LEVETIRACETAM 500 MILLIGRAM(S): 250 TABLET, FILM COATED ORAL at 11:31

## 2020-02-07 RX ADMIN — NYSTATIN CREAM 1 APPLICATION(S): 100000 CREAM TOPICAL at 06:30

## 2020-02-07 RX ADMIN — CARVEDILOL PHOSPHATE 6.25 MILLIGRAM(S): 80 CAPSULE, EXTENDED RELEASE ORAL at 21:48

## 2020-02-07 RX ADMIN — NYSTATIN CREAM 1 APPLICATION(S): 100000 CREAM TOPICAL at 18:45

## 2020-02-07 RX ADMIN — CARVEDILOL PHOSPHATE 6.25 MILLIGRAM(S): 80 CAPSULE, EXTENDED RELEASE ORAL at 11:31

## 2020-02-07 RX ADMIN — Medication 4 MILLIGRAM(S): at 21:48

## 2020-02-07 RX ADMIN — ENOXAPARIN SODIUM 40 MILLIGRAM(S): 100 INJECTION SUBCUTANEOUS at 21:47

## 2020-02-07 RX ADMIN — CLOPIDOGREL BISULFATE 75 MILLIGRAM(S): 75 TABLET, FILM COATED ORAL at 11:31

## 2020-02-07 RX ADMIN — AMLODIPINE BESYLATE 10 MILLIGRAM(S): 2.5 TABLET ORAL at 05:58

## 2020-02-07 RX ADMIN — Medication 4 MILLIGRAM(S): at 00:01

## 2020-02-07 RX ADMIN — ATORVASTATIN CALCIUM 80 MILLIGRAM(S): 80 TABLET, FILM COATED ORAL at 00:01

## 2020-02-07 RX ADMIN — LEVETIRACETAM 500 MILLIGRAM(S): 250 TABLET, FILM COATED ORAL at 00:01

## 2020-02-07 NOTE — PROGRESS NOTE ADULT - PROBLEM SELECTOR PLAN 1
#Metabolic Encephalopathy 2/2 stroke vs seizure (less likely):   Unclear etiology of events. CTH negative at [facility] (stroke code x2). s/p aborted thrombectomy with evidence of chronic ischemia. Repeat CT with evidence of contrast extravasation but no acute ischemic or hemorrhagic event. Patient with notable tremors and facial grimaces. VEEG showed no signs of seizure and MRI brain reveals R MCA infarct affecting the basal ganglia, frontal, and temporal lobes, likely etiology of metabolic encephalopathy.   - c/w Keppra 500mg BID  - minimize ativan and sedation  - epilepsy following, appreciate recs  - stroke team following appreciate recs  - PT recommending of acute rehab facility

## 2020-02-07 NOTE — CONSULT NOTE ADULT - PROBLEM SELECTOR RECOMMENDATION 2
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   -Patient now more alert at times, though still lethargic/minimally responsive. Vocalizing but nothing discernable. Moves LE b/l and RUE.    -c/w ASA 81mg PO daily and Plavix 75mg PO daily  -c/w Atorvastatin 80mg PO daily As per primary team, patient's brother Tim Villanueva [phone] is the HCP and making decisions for his care, but no evidence of HCP found.     He was stepped down from 7EA to 7 [facility] and is pending rehab placement. Palliative care was consulted for goals of care discussion and to provide support services to the family. Called the brother this morning, he would like to speak face to face and will come back later today. Will update once discussion taken place. Currently FULL CODE. He also has a NGT and will likely need a PEG. Will discuss with brother. Will follow up with brother if arrives to the bedside today.

## 2020-02-07 NOTE — PROGRESS NOTE ADULT - PROBLEM SELECTOR PLAN 7
1.       PCP Contacted on Admission: (Y/N) --> Name & Phone #:  2.       Date of Contact with PCP:  3.       PCP Contacted at Discharge: (Y/N)  4.       Summary of Handoff Given to PCP:  5.       Post-Discharge Appointment Date and Location: [facility] Per family, patient with history of depression. Previously on sertaline, now on trazadone 100mg.  - holding home meds in setting of lethargy

## 2020-02-07 NOTE — CONSULT NOTE ADULT - PROBLEM SELECTOR RECOMMENDATION 9
As per primary team, patient's brother is the HCP and making decisions for his care. He was stepped down from 7EA to 7 [facility] and is pending rehab placement. Palliative care was consulted for goals of care discussion and to provide support services to the family. Called the brother this morning, he would like to speak face to face and will come back later today. Will update once discussion taken place. Currently FULL CODE. He also has a NGT and will likely need a PEG. Will discuss with brother. As per primary team, patient's brother Jc Villanueva [phone] is the HCP and making decisions for his care. He was stepped down from 7EA to 7 [facility] and is pending rehab placement. Palliative care was consulted for goals of care discussion and to provide support services to the family. Called the brother this morning, he would like to speak face to face and will come back later today. Will update once discussion taken place. Currently FULL CODE. He also has a NGT and will likely need a PEG. Will discuss with brother.  - Also no official HCP documentation in chart. Will follow up with brother MRI showing evidence of new infarct to R MCA.   -Patient now more alert at times, though still lethargic/minimally responsive. Vocalizing but nothing discernable. Moves LE b/l and RUE.      Consider SLP evaluation.  Management as per primary team.

## 2020-02-07 NOTE — CONSULT NOTE ADULT - ATTENDING COMMENTS
Patient had attempted thrombectomy for right MCA LVO that was aborted because it appears that the LVO is chronic notwithstanding the perfusion study. Post extubation in the MICU he had a clonic event with respiratory distress. intubated and then post intubation had left leg jerking  started on keppra 1000 mg loading and then 500 BID.
Saw and evaluated the patient with Dr Bradford, medical resident on palliative rotation, during bedside rounds. Agree with above findings and recommendations that were discussed with me at bedside and during discussion of the patients case.

## 2020-02-07 NOTE — PROGRESS NOTE ADULT - PROBLEM SELECTOR PLAN 5
Per family, patient with history of depression. Previously on sertaline, now on trazadone 100mg.  - holding home meds in setting of lethargy Patient has slightly rising Creatine to 1.42, from baseline 0.93 likely in the setting of poor oral intake.   - F/u urine lytes   - Trend BMP   - Avoid nephrotoxic medications

## 2020-02-07 NOTE — CHART NOTE - NSCHARTNOTEFT_GEN_A_CORE
PALLIATIVE MEDICINE COORDINATION OF CARE NOTE FOR ADINA MADRID  [  ] ED trigger    [  ] MICU trigger    [ X ] Consult    30 Minutes; Start: 12:00pm End: 12:30pm, of non-face-to-face prolonged service provided that relates to (face-to-face) care that has or will occur and ongoing patient management, including one or more of the following:     - Reviewed records from other physicians or other health care professional services, including one or more of the following: other medical records and diagnostic / radiology study results     HPI:  History obtained from [facility] medical record, as patient altered and subsequently intubated.   63 y/o male with PMH hypertension CVA (2 months ago at [facility]), dementia and depression transferred from [facility] with concern for stroke (MCA). Patient presented to [facility] after presenting to pharmacy with shirt and shoes on backwards and episode of falling on floor and speech slurring in pharmacy, found to have left sided facial drooping of eyelids, slurred speech, and expressive aphasia on presentation to [facility]. Last known normal unknown. Baseline mental status AAOx3 able to follow commands, completes ADLs independently.     Patient outside of window for tPA, transferred to [facility] for possible thrombectomy. At [facility], stroke code called on admission - imaging negative for acute infarct. Thrombectomy of R MCA attempted, however aborted as noted to have stump occlusion of mid right M1 with extensive JOSEPHINE collaterals - likely chronic occulusion. Patient extubated and upon presentation to MICU, patient noted to be altered with tongue rolling back, with contraction of left upper and lower extremity - patient intubated for airway protection. Second stroke code called, with repeat imaging without evidence of acute infarct. Patients admitted to MICU, intubated pending vEEG.    [facility]: Vitals: T: Afebrile | HR: 91-93 | BP: 147-159/ (MAP: ) | RR: 20-22 |   Labs: BMP: WNL | UA: WNL | BAL <10 | Lipid panel: ; Cholesterol 176 |   Interventions: CTH: No acute intracranial abnormality; chronic microvascular ischemic and volume changes - old posterior right frontal cortical infarct noted.   CT Perfusion Brain w/ contrast: Right MCA abnormal flow pattern consistent with ischemic change; no discrete evidence of infarct.   [facility]: Vitals: T: Afebrile | HR: 90 | BP: 208/123 | RR: 14  Labs: WBC 6.88; Hgb 13; Plt 144; Cr 1.21; Glucose 104  CT Brain Stroke (on arrival): Gyriform sites of cortical hyperdensity, presumed contrast enhancement, are favored to be subacute sites of infarction, superimposed on more chronic sites of right MCA infarction.  CT Brain Stroke (2nd stroke code, following aborted thrombectomy): Compared to prior study from earlier in the day, no significant change in scattered enhancement of the right frontal and posterior temporal lobes, likely secondary to subacute infarction  CT Perfusion: Abnormal perfusion with RAPID calculated mismatch volume of 44 ml and mismatch ratio is infinite within the right MCA territory.  CT Angio Head: Probable high-grade stenosis of the right M1 segment rather than occlusion. Multifocal areas of narrowing involving the right A1 and left M1 segment which may be secondary to intracranial atherosclerosis.  CT Angio Neck: Normal CTA neck.   Interventions: Intubated and sedated s/p thrombectomy, vEEG placed (31 Jan 2020 19:25)    - Other: [facility] reviewed.    Rx found on [facility] review. Ref #: [id number]    - Other: Medication reviewed.    The patient HAS NOT used PRN's in the last 24h.    MEDICATIONS  (STANDING):  amLODIPine   Tablet 10 milliGRAM(s) Oral every 24 hours  aspirin  chewable 81 milliGRAM(s) Oral daily  atorvastatin 80 milliGRAM(s) Oral at bedtime  carvedilol 6.25 milliGRAM(s) Oral every 12 hours  clopidogrel Tablet 75 milliGRAM(s) Oral daily  doxazosin 4 milliGRAM(s) Oral at bedtime  enalapril 10 milliGRAM(s) Oral every 24 hours  enoxaparin Injectable 40 milliGRAM(s) SubCutaneous every 24 hours  levETIRAcetam  Solution 500 milliGRAM(s) Enteral Tube every 12 hours  nystatin Powder 1 Application(s) Topical two times a day    MEDICATIONS  (PRN):  acetaminophen    Suspension .. 650 milliGRAM(s) Oral every 8 hours PRN Temp greater or equal to 38C (100.4F)    - Other: Advanced directives     Full Code       No documented HCP form found on [facility]     No Living will / POA / Advance directives found on [place] / [facility].     No documented GOC notes on [facility]    - Other: Coordination/Plan of care     1st [facility] admission       Current admission LOS: 8 days     LACE score: 6      Patient NOT previously seen by palliative medicine consult service.      Patient will be seen as full consult within 24h. Consult requested for: GOMYCHAL

## 2020-02-07 NOTE — PROGRESS NOTE ADULT - SUBJECTIVE AND OBJECTIVE BOX
Neurology Stroke Progress Note    INTERVAL HPI/OVERNIGHT EVENTS:  Patient seen and examined. Stuporous, not speaking or following commands    MEDICATIONS  (STANDING):  amLODIPine   Tablet 10 milliGRAM(s) Oral every 24 hours  aspirin  chewable 81 milliGRAM(s) Oral daily  atorvastatin 80 milliGRAM(s) Oral at bedtime  carvedilol 6.25 milliGRAM(s) Oral every 12 hours  clopidogrel Tablet 75 milliGRAM(s) Oral daily  doxazosin 4 milliGRAM(s) Oral at bedtime  enalapril 10 milliGRAM(s) Oral every 24 hours  enoxaparin Injectable 40 milliGRAM(s) SubCutaneous every 24 hours  levETIRAcetam  Solution 500 milliGRAM(s) Enteral Tube every 12 hours  modafinil 100 milliGRAM(s) Oral every 24 hours  nystatin Powder 1 Application(s) Topical two times a day    MEDICATIONS  (PRN):  acetaminophen    Suspension .. 650 milliGRAM(s) Oral every 8 hours PRN Temp greater or equal to 38C (100.4F)      Allergies    Allergy Status Unknown    Intolerances        ROS: As per HPI, otherwise negative    Vital Signs Last 24 Hrs  T(C): 36.6 (07 Feb 2020 17:33), Max: 36.8 (06 Feb 2020 22:15)  T(F): 97.9 (07 Feb 2020 17:33), Max: 98.3 (07 Feb 2020 14:16)  HR: 66 (07 Feb 2020 16:13) (66 - 80)  BP: 155/77 (07 Feb 2020 16:13) (101/60 - 177/88)  BP(mean): 108 (07 Feb 2020 16:13) (75 - 122)  RR: 18 (07 Feb 2020 16:13) (18 - 20)  SpO2: 95% (07 Feb 2020 16:13) (93% - 99%)    Physical exam:  General: awake and alert, sitting comfortably, no acute distress    Neurologic:  Mental status: stuporous, not speaking or following commands.  Cranial nerves:   II: pupils equally round and reactive to light,   III, IV, VI: right gaze to midline  VII: slight left facial droop  Motor: Normal bulk and tone, minimal movement of right toe and right fingers, no other movement seen  Sensation: no response to noxious stimuli  Coordination: did not cooperate  Gait: deferred        LABS:                        15.1   6.48  )-----------( 188      ( 07 Feb 2020 07:18 )             44.8     02-07    151<H>  |  113<H>  |  26<H>  ----------------------------<  123<H>  3.9   |  25  |  1.42<H>    Ca    9.7      07 Feb 2020 07:18  Mg     2.3     02-07            RADIOLOGY & ADDITIONAL TESTS:      Assessment and Plan  64M with PMHx of HTN, CVA (residual dysarthria and left-hand weakness), and anxiety disorder who presented with an acute R MCA stroke with perfusion defect on CT scan s/p attempted mechanical thrombectomy (aborted due to 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 Second stroke code called for intermittent jerking and extremity posturing. Repeat CTH unchanged, vEEG negative.  MRI brain revealed right MCA infarct. Patient extubated 2/2/2020, but now lethargic and not following commands.    1)Secondary stroke prevention  -Continue ASA 81mg PO daily and Plavix 75mg PO daily   -Continue Atorvastatin 80mg PO daily     2) Stroke risk factors  -HTN - c/w home Amlodipine   -Prev CVA (baseline dysarthria and left hand weakness)    3) Further workup   -Due to severe reaction to benzos, there is a concern for safety of doing LOC as patient would likely receive versed during this procedure; however, LOC would be beneficial for stroke workup as it could potentially  if there is a clot in the left atrium. Given pt's continued altered mental status, will continue to hold off on LOC  - Continue Keppra 500mg BID  -Start Modafanil  - PT/OT rec for AR  -Consider repeat CT if pt continues to stay stuporous    DVT prophylaxis   -Lovenox SQ and SCDs Neurology Stroke Progress Note    INTERVAL HPI/OVERNIGHT EVENTS:  Patient seen and examined. Stuporous, not speaking or following commands    MEDICATIONS  (STANDING):  amLODIPine   Tablet 10 milliGRAM(s) Oral every 24 hours  aspirin  chewable 81 milliGRAM(s) Oral daily  atorvastatin 80 milliGRAM(s) Oral at bedtime  carvedilol 6.25 milliGRAM(s) Oral every 12 hours  clopidogrel Tablet 75 milliGRAM(s) Oral daily  doxazosin 4 milliGRAM(s) Oral at bedtime  enalapril 10 milliGRAM(s) Oral every 24 hours  enoxaparin Injectable 40 milliGRAM(s) SubCutaneous every 24 hours  levETIRAcetam  Solution 500 milliGRAM(s) Enteral Tube every 12 hours  modafinil 100 milliGRAM(s) Oral every 24 hours  nystatin Powder 1 Application(s) Topical two times a day    MEDICATIONS  (PRN):  acetaminophen    Suspension .. 650 milliGRAM(s) Oral every 8 hours PRN Temp greater or equal to 38C (100.4F)      Allergies    Allergy Status Unknown    Intolerances        ROS: As per HPI, otherwise negative    Vital Signs Last 24 Hrs  T(C): 36.6 (07 Feb 2020 17:33), Max: 36.8 (06 Feb 2020 22:15)  T(F): 97.9 (07 Feb 2020 17:33), Max: 98.3 (07 Feb 2020 14:16)  HR: 66 (07 Feb 2020 16:13) (66 - 80)  BP: 155/77 (07 Feb 2020 16:13) (101/60 - 177/88)  BP(mean): 108 (07 Feb 2020 16:13) (75 - 122)  RR: 18 (07 Feb 2020 16:13) (18 - 20)  SpO2: 95% (07 Feb 2020 16:13) (93% - 99%)    Physical exam:  General: awake and alert, sitting comfortably, no acute distress    Neurologic:  Mental status: stuporous, not speaking or following commands.  Cranial nerves:   II: pupils equally round and reactive to light,   III, IV, VI: right gaze to midline  VII: slight left facial droop  Motor: Normal bulk and tone, minimal movement of right toe and right fingers, no other movement seen  Sensation: no response to noxious stimuli  Coordination: did not cooperate  Gait: deferred        LABS:                        15.1   6.48  )-----------( 188      ( 07 Feb 2020 07:18 )             44.8     02-07    151<H>  |  113<H>  |  26<H>  ----------------------------<  123<H>  3.9   |  25  |  1.42<H>    Ca    9.7      07 Feb 2020 07:18  Mg     2.3     02-07            RADIOLOGY & ADDITIONAL TESTS:      Assessment and Plan  64M with PMHx of HTN, CVA (residual dysarthria and left-hand weakness), and anxiety disorder who presented with an acute R MCA stroke with perfusion defect on CT scan s/p attempted mechanical thrombectomy (aborted due to 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 Second stroke code called for intermittent jerking and extremity posturing. Repeat CTH unchanged, vEEG negative.  MRI brain revealed right MCA infarct. Patient extubated 2/2/2020, but now lethargic and not following commands.    1)Secondary stroke prevention  -Continue ASA 81mg PO daily and Plavix 75mg PO daily   -Continue Atorvastatin 80mg PO daily     2) Stroke risk factors  -HTN - c/w home Amlodipine   -Prev CVA (baseline dysarthria and left hand weakness)    3) Further workup   -Due to severe reaction to benzos, there is a concern for safety of doing LOC as patient would likely receive versed during this procedure; however, LOC would be beneficial for stroke workup as it could potentially  if there is a clot in the left atrium. Given pt's continued altered mental status, will continue to hold off on LOC  - Continue Keppra 500mg BID  -Start Modafanil 100mg daily for more awakefulness  - PT/OT rec for AR  -Consider repeat CT if pt continues to stay stuporous    DVT prophylaxis   -Lovenox SQ and SCDs

## 2020-02-07 NOTE — CONSULT NOTE ADULT - ASSESSMENT
65 y/o M with PMHx of HTN and CVA (2 months ago) transferred from [facility] s/p stroke presenting for possible thrombectomy, as patient outside window for tPA - c/b reintubation following thrombectomy, now with metabolic encephalopathy, concern for seizure vs acute stroke. MRI with R MCA infarct. Palliative team consulted for goals of care discussion and support services. 63 y/o M with PMHx of HTN and CVA (2 months ago) transferred from [facility] s/p stroke presenting for possible thrombectomy, as patient outside window for tPA - c/b reintubation following thrombectomy, now with metabolic encephalopathy, concern for seizure vs acute stroke. MRI with R MCA infarct. Palliative team consulted for goals of care discussion and support services.

## 2020-02-07 NOTE — PROGRESS NOTE ADULT - ASSESSMENT
65 y/o M with PMHx of HTN and CVA (2 months ago) transferred from [facility] s/p stroke (left facial and eyelid drooling and speech slurring presenting for possible thrombectomy, as patient outside window for tPA - c/b reintubation following thrombectomy, now with metabolic encephalopathy, concern for seizure vs acute stroke.

## 2020-02-07 NOTE — CONSULT NOTE ADULT - SUBJECTIVE AND OBJECTIVE BOX
ADINA MADRID   MRN-[id number]     (1955):     HPI:  History obtained from [place] medical record, as patient altered and subsequently intubated.     65 y/o male with PMH hypertension CVA (2 months ago at [facility]), dementia and depression transferred from [facility] with concern for stroke (MCA). Patient presented to [facility] after presenting to pharmacy with shirt and shoes on backwards and episode of falling on floor and speech slurring in pharmacy, found to have left sided facial drooping of eyelids, slurred speech, and expressive aphasia on presentation to [place]. Last known normal unknown. Baseline mental status AAOx3 able to follow commands, completes ADLs independently.     Patient outside of window for tPA, transferred to [facility] for possible thrombectomy. At [facility], stroke code called on admission - imaging negative for acute infarct. Thrombectomy of R MCA attempted, however aborted as noted to have stump occlusion of mid right M1 with extensive JOSEPHINE collaterals - likely chronic occulusion. Patient extubated and upon presentation to MICU, patient noted to be altered with tongue rolling back, with contraction of left upper and lower extremity - patient intubated for airway protection. Second stroke code called, with repeat imaging without evidence of acute infarct. Patients admitted to MICU, intubated pending vEEG.      [place]: Vitals: T: Afebrile | HR: 91-93 | BP: 147-159/ (MAP: ) | RR: 20-22 |   Labs: BMP: WNL | UA: WNL | BAL <10 | Lipid panel: ; Cholesterol 176 |   Interventions: CTH: No acute intracranial abnormality; chronic microvascular ischemic and volume changes - old posterior right frontal cortical infarct noted.   CT Perfusion Brain w/ contrast: Right MCA abnormal flow pattern consistent with ischemic change; no discrete evidence of infarct.     [facility]: Vitals: T: Afebrile | HR: 90 | BP: 208/123 | RR: 14  Labs: WBC 6.88; Hgb 13; Plt 144; Cr 1.21; Glucose 104  CT Brain Stroke (on arrival): Gyriform sites of cortical hyperdensity, presumed contrast enhancement, are favored to be subacute sites of infarction, superimposed on more chronic sites of right MCA infarction.  CT Brain Stroke (2nd stroke code, following aborted thrombectomy): Compared to prior study from earlier in the day, no significant change in scattered enhancement of the right frontal and posterior temporal lobes, likely secondary to subacute infarction  CT Perfusion: Abnormal perfusion with RAPID calculated mismatch volume of 44 ml and mismatch ratio is infinite within the right MCA territory.  CT Angio Head: Probable high-grade stenosis of the right M1 segment rather than occlusion. Multifocal areas of narrowing involving the right A1 and left M1 segment which may be secondary to intracranial atherosclerosis.  CT Angio Neck: Normal CTA neck.   Interventions: Intubated and sedated s/p thrombectomy, vEEG placed (2020 19:25)    PAST MEDICAL & SURGICAL HISTORY:  Hypertension  CVA (cerebral vascular accident)  No significant past surgical history    FAMILY HISTORY:  No pertinent family history in first degree relatives    ROS: Unable to be obtained as not responding appropriately to questions   Dyspnea (Rafi 0-10): 0                    N/V (Y/N): N/A                        Secretions (Y/N) : N/A           Agitation(Y/N): N/A      Pain (Y/N): N/A      -Provocation/Palliation:  -Quality/Quantity:  -Radiating:  -Severity:  -Timing/Frequency:  -Impact on ADLs:    General:  Denied  HEENT:    Denied  Neck:  Denied  CVS:  Denied  Resp:  Denied  GI:  Denied  :  Denied  Musc:  Denied  Neuro:  Denied  Psych:  Denied  Skin:  Denied  Lymph:  Denied    Allergies  Allergy Status Unknown  Intolerances    Opiate Naive (Y/N):   -iStop reviewed (Y/N):    MEDICATIONS:      MEDICATIONS  (STANDING):  amLODIPine   Tablet 10 milliGRAM(s) Oral every 24 hours  aspirin  chewable 81 milliGRAM(s) Oral daily  atorvastatin 80 milliGRAM(s) Oral at bedtime  carvedilol 6.25 milliGRAM(s) Oral every 12 hours  clopidogrel Tablet 75 milliGRAM(s) Oral daily  doxazosin 4 milliGRAM(s) Oral at bedtime  enalapril 10 milliGRAM(s) Oral every 24 hours  enoxaparin Injectable 40 milliGRAM(s) SubCutaneous every 24 hours  levETIRAcetam  Solution 500 milliGRAM(s) Enteral Tube every 12 hours  nystatin Powder 1 Application(s) Topical two times a day    MEDICATIONS  (PRN):  acetaminophen    Suspension .. 650 milliGRAM(s) Oral every 8 hours PRN Temp greater or equal to 38C (100.4F)      LABS:    CBC:                        15.1   6.48  )-----------( 188      ( 2020 07:18 )             44.8     CMP:        151<H>  |  113<H>  |  26<H>  ----------------------------<  123<H>  3.9   |  25  |  1.42<H>    Ca    9.7      2020 07:18  Mg     2.3         IMAGING:  Reviewed    PHYSICAL EXAM:  T(C): 36.8 (20 @ 05:30), Max: 36.8 (20 @ 22:15)  HR: 78 (20 @ 11:35) (66 - 80)  BP: 139/77 (20 @ 11:35) (101/60 - 177/88)  RR: 18 (20 @ 11:35) (18 - 20)  SpO2: 93% (20 @ 11:35) (93% - 99%)  Wt(kg): --82.3kg  Daily     Daily   CAPILLARY BLOOD GLUCOSE    I&O's Summary    2020 07:  -  2020 07:00  --------------------------------------------------------  IN: 115 mL / OUT: 600 mL / NET: -485 mL    2020 07:  -  2020 14:00  --------------------------------------------------------  IN: 120 mL / OUT: 0 mL / NET: 120 mL     General: nad  HEENT: NC/AT; PERRL, clear conjunctiva  Neck: supple, no JVD,   Cardiovascular: +S1/S2; RRR, no M/R/G  Respiratory: CTA b/l; no W/R/R  Gastrointestinal: soft, NT/ND; +BSx4, NG tube in place   Extremities: WWP; 2+ peripheral pulses; no edema   Neurological: AAOx0; not able to follow commands, or respond to name, has spontaneous movements of the right upper extremity and right lower extremity, unable to move his left hand and leg.     Preadmit Karnofsky:  %           Current Karnofsky:     %  Cachexia (Y/N):   BMI:    ADVANCED DIRECTIVES:     Full Code      Decision maker: Brother: Jc Madrid [phone]    GOALS OF CARE DISCUSSION:       Palliative care info/counseling provided	             REFERRALS:	        Palliative Med        Unit SW/Case Mgmt              Speech/Swallow       Patient/Family Support       Massage Therapy       Music Therapy       Hospice       Nutrition       Ethics       PT/OT ADINA MADRID   MRN-[id number]     (1955):     HPI:  History obtained from [place] medical record, as patient altered and subsequently intubated.     65 y/o male with PMH hypertension CVA (2 months ago at [facility]), dementia and depression transferred from [facility] with concern for stroke (MCA). Patient presented to [facility] after presenting to pharmacy with shirt and shoes on backwards and episode of falling on floor and speech slurring in pharmacy, found to have left sided facial drooping of eyelids, slurred speech, and expressive aphasia on presentation to [place]. Last known normal unknown. Baseline mental status AAOx3 able to follow commands, completes ADLs independently.     Patient outside of window for tPA, transferred to [facility] for possible thrombectomy. At [facility], stroke code called on admission - imaging negative for acute infarct. Thrombectomy of R MCA attempted, however aborted as noted to have stump occlusion of mid right M1 with extensive JOSEPHINE collaterals - likely chronic occulusion. Patient extubated and upon presentation to MICU, patient noted to be altered with tongue rolling back, with contraction of left upper and lower extremity - patient intubated for airway protection. Second stroke code called, with repeat imaging without evidence of acute infarct. Patients admitted to MICU, intubated pending vEEG.      [place]: Vitals: T: Afebrile | HR: 91-93 | BP: 147-159/ (MAP: ) | RR: 20-22 |   Labs: BMP: WNL | UA: WNL | BAL <10 | Lipid panel: ; Cholesterol 176 |   Interventions: CTH: No acute intracranial abnormality; chronic microvascular ischemic and volume changes - old posterior right frontal cortical infarct noted.   CT Perfusion Brain w/ contrast: Right MCA abnormal flow pattern consistent with ischemic change; no discrete evidence of infarct.     [facility]: Vitals: T: Afebrile | HR: 90 | BP: 208/123 | RR: 14  Labs: WBC 6.88; Hgb 13; Plt 144; Cr 1.21; Glucose 104  CT Brain Stroke (on arrival): Gyriform sites of cortical hyperdensity, presumed contrast enhancement, are favored to be subacute sites of infarction, superimposed on more chronic sites of right MCA infarction.  CT Brain Stroke (2nd stroke code, following aborted thrombectomy): Compared to prior study from earlier in the day, no significant change in scattered enhancement of the right frontal and posterior temporal lobes, likely secondary to subacute infarction  CT Perfusion: Abnormal perfusion with RAPID calculated mismatch volume of 44 ml and mismatch ratio is infinite within the right MCA territory.  CT Angio Head: Probable high-grade stenosis of the right M1 segment rather than occlusion. Multifocal areas of narrowing involving the right A1 and left M1 segment which may be secondary to intracranial atherosclerosis.  CT Angio Neck: Normal CTA neck.   Interventions: Intubated and sedated s/p thrombectomy, vEEG placed (2020 19:25)    PAST MEDICAL & SURGICAL HISTORY:  Hypertension  CVA (cerebral vascular accident)  No significant past surgical history    FAMILY HISTORY:  No pertinent family history in first degree relatives    ROS: Unable to be obtained as not responding appropriately to questions   Dyspnea (Rafi 0-10): 0                   N/V (Y/N): N/A                        Secretions (Y/N) : N/A           Agitation(Y/N): N/A      Pain (Y/N): N/A      -Provocation/Palliation:  -Quality/Quantity:  -Radiating:  -Severity:  -Timing/Frequency:  -Impact on ADLs:    General:  Denied  HEENT:    Denied  Neck:  Denied  CVS:  Denied  Resp:  Denied  GI:  Denied  :  Denied  Musc:  Denied  Neuro:  Denied  Psych:  Denied  Skin:  Denied  Lymph:  Denied    Allergies  Allergy Status Unknown  Intolerances    Opiate Naive (Y/N):   -iStop reviewed (Y/N):    MEDICATIONS:      MEDICATIONS  (STANDING):  amLODIPine   Tablet 10 milliGRAM(s) Oral every 24 hours  aspirin  chewable 81 milliGRAM(s) Oral daily  atorvastatin 80 milliGRAM(s) Oral at bedtime  carvedilol 6.25 milliGRAM(s) Oral every 12 hours  clopidogrel Tablet 75 milliGRAM(s) Oral daily  doxazosin 4 milliGRAM(s) Oral at bedtime  enalapril 10 milliGRAM(s) Oral every 24 hours  enoxaparin Injectable 40 milliGRAM(s) SubCutaneous every 24 hours  levETIRAcetam  Solution 500 milliGRAM(s) Enteral Tube every 12 hours  nystatin Powder 1 Application(s) Topical two times a day    MEDICATIONS  (PRN):  acetaminophen    Suspension .. 650 milliGRAM(s) Oral every 8 hours PRN Temp greater or equal to 38C (100.4F)      LABS:    CBC:                        15.1   6.48  )-----------( 188      ( 2020 07:18 )             44.8     CMP:        151<H>  |  113<H>  |  26<H>  ----------------------------<  123<H>  3.9   |  25  |  1.42<H>    Ca    9.7      2020 07:18  Mg     2.3         IMAGING:  Reviewed    PHYSICAL EXAM:  T(C): 36.8 (20 @ 05:30), Max: 36.8 (20 @ 22:15)  HR: 78 (20 @ 11:35) (66 - 80)  BP: 139/77 (20 @ 11:35) (101/60 - 177/88)  RR: 18 (20 @ 11:35) (18 - 20)  SpO2: 93% (20 @ 11:35) (93% - 99%)  Wt(kg): --82.3kg  Daily     Daily   CAPILLARY BLOOD GLUCOSE    I&O's Summary    2020 07:  -  2020 07:00  --------------------------------------------------------  IN: 115 mL / OUT: 600 mL / NET: -485 mL    2020 07:  -  2020 14:00  --------------------------------------------------------  IN: 120 mL / OUT: 0 mL / NET: 120 mL     General: nad  HEENT: NC/AT; PERRL, clear conjunctiva  Neck: supple, no JVD,   Cardiovascular: +S1/S2; RRR, no M/R/G  Respiratory: CTA b/l; no W/R/R  Gastrointestinal: soft, NT/ND; +BSx4, NG tube in place   Extremities: WWP; 2+ peripheral pulses; no edema   Neurological: AAOx0; not able to follow commands, or respond to name, has spontaneous movements of the right upper extremity and right lower extremity, unable to move his left hand and leg.     Preadmit Karnofsky:  %           Current Karnofsky:     %40  Cachexia (Y/N): N  BMI: 31.2    ADVANCED DIRECTIVES:  Full Code      Decision maker: Brother: Jc [place] [phone]. No HCP documentation present in chart.     GOALS OF CARE DISCUSSION:       Palliative care info/counseling provided	             REFERRALS:	        Palliative Med        Unit SW/Case Mgmt              Speech/Swallow       Patient/Family Support       Massage Therapy       Music Therapy       Hospice       Nutrition       Ethics       PT/OT ADINA MADRID   MRN-[id number]     (1955):     HPI:  History obtained from [place] medical record, as patient altered and subsequently intubated.   63 y/o male with PMH hypertension CVA (2 months ago at [facility]), dementia and depression transferred from [facility] with concern for stroke (MCA). Patient presented to [facility] after presenting to pharmacy with shirt and shoes on backwards and episode of falling on floor and speech slurring in pharmacy, found to have left sided facial drooping of eyelids, slurred speech, and expressive aphasia on presentation to [place]. Last known normal unknown. Baseline mental status AAOx3 able to follow commands, completes ADLs independently.     Patient outside of window for tPA, transferred to [facility] for possible thrombectomy. At [facility], stroke code called on admission - imaging negative for acute infarct. Thrombectomy of R MCA attempted, however aborted as noted to have stump occlusion of mid right M1 with extensive JOSEPHINE collaterals - likely chronic occulusion. Patient extubated and upon presentation to MICU, patient noted to be altered with tongue rolling back, with contraction of left upper and lower extremity - patient intubated for airway protection. Second stroke code called, with repeat imaging without evidence of acute infarct. Patients admitted to MICU, intubated pending vEEG.    [place]: Vitals: T: Afebrile | HR: 91-93 | BP: 147-159/ (MAP: ) | RR: 20-22 |   Labs: BMP: WNL | UA: WNL | BAL <10 | Lipid panel: ; Cholesterol 176 |   Interventions: CTH: No acute intracranial abnormality; chronic microvascular ischemic and volume changes - old posterior right frontal cortical infarct noted.   CT Perfusion Brain w/ contrast: Right MCA abnormal flow pattern consistent with ischemic change; no discrete evidence of infarct.   [facility]: Vitals: T: Afebrile | HR: 90 | BP: 208/123 | RR: 14  Labs: WBC 6.88; Hgb 13; Plt 144; Cr 1.21; Glucose 104  CT Brain Stroke (on arrival): Gyriform sites of cortical hyperdensity, presumed contrast enhancement, are favored to be subacute sites of infarction, superimposed on more chronic sites of right MCA infarction.  CT Brain Stroke (2nd stroke code, following aborted thrombectomy): Compared to prior study from earlier in the day, no significant change in scattered enhancement of the right frontal and posterior temporal lobes, likely secondary to subacute infarction  CT Perfusion: Abnormal perfusion with RAPID calculated mismatch volume of 44 ml and mismatch ratio is infinite within the right MCA territory.  CT Angio Head: Probable high-grade stenosis of the right M1 segment rather than occlusion. Multifocal areas of narrowing involving the right A1 and left M1 segment which may be secondary to intracranial atherosclerosis.  CT Angio Neck: Normal CTA neck.   Interventions: Intubated and sedated s/p thrombectomy, vEEG placed (2020 19:25)    PAST MEDICAL & SURGICAL HISTORY:  Hypertension  CVA (cerebral vascular accident)  No significant past surgical history    FAMILY HISTORY:  No pertinent family history in first degree relatives, mother, or father found on chart.    ROS:   Unable to be obtained as not responding appropriately to questions     Dyspnea (Arfi 0-10): 0                   N/V (Y/N): Unable to assess                     Secretions (Y/N) : No           Agitation(Y/N): No    Pain (Y/N): RANDI pain scale: 2      -Provocation/Palliation: Unable to assess    -Quality/Quantity: Unable to assess    -Radiating: Unable to assess    -Severity: No pain  -Timing/Frequency: Unable to assess    -Impact on ADLs: Unable to assess      Allergies: Allergy Status Unknown    Opiate Naive (Y/N): Yes  -iStop reviewed (Y/N): Yes. No Rx found on istop review. Ref#: [id number]    MEDICATIONS:      MEDICATIONS  (STANDING):  amLODIPine   Tablet 10 milliGRAM(s) Oral every 24 hours  aspirin  chewable 81 milliGRAM(s) Oral daily  atorvastatin 80 milliGRAM(s) Oral at bedtime  carvedilol 6.25 milliGRAM(s) Oral every 12 hours  clopidogrel Tablet 75 milliGRAM(s) Oral daily  doxazosin 4 milliGRAM(s) Oral at bedtime  enalapril 10 milliGRAM(s) Oral every 24 hours  enoxaparin Injectable 40 milliGRAM(s) SubCutaneous every 24 hours  levETIRAcetam  Solution 500 milliGRAM(s) Enteral Tube every 12 hours  nystatin Powder 1 Application(s) Topical two times a day    MEDICATIONS  (PRN):  acetaminophen    Suspension .. 650 milliGRAM(s) Oral every 8 hours PRN Temp greater or equal to 38C (100.4F)    LABS:    CBC:                        15.1   6.48  )-----------( 188      ( 2020 07:18 )             44.8     CMP:        151<H>  |  113<H>  |  26<H>  ----------------------------<  123<H>  3.9   |  25  |  1.42<H>    Ca    9.7      2020 07:18  Mg     2.3     02-    Culture - Blood (20 @ 21:02)    Specimen Source: .Blood Blood    Culture Results:   No growth at 3 days.    Culture - Blood (20 @ 21:02)    Specimen Source: .Blood Blood    Culture Results:   No growth at 3 days.    Culture - Blood (20 @ 17:59)    Specimen Source: .Blood Blood    Culture Results:   No growth at 5 days.    Culture - Blood (20 @ 17:59)    Specimen Source: .Blood Blood    Culture Results:   No growth at 5 days.    Hepatitis C Antibody Test (20 @ 05:51)    Hepatitis C Virus S/CO Ratio: 0.07 S/CO    Hepatitis C Virus Interpretation: Nonreact: Hepatitis C AB     IMAGING:  Reviewed    EXAM:  XR CHEST PORTABLE URGENT 1V                        PROCEDURE DATE:  2020    ******PRELIMINARY REPORT******    ******PRELIMINARY REPORT******        INTERPRETATION:    XR CHEST URGENT dated 2020 11:11 PM  HISTORY: NGT Placement  COMPARISON: Chest x-ray from 2020.  FINDINGS: Partially visualized lower lung fields are clear. Opening of the enteric tube is seen in the area of the stomach. There are no pleural effusions. The cardiomediastinal silhouette, bones and soft tissues are unremarkable.  Nonspecific bowel gas pattern is identified within the abdomen.  IMPRESSION: Appropriate positioning of enteric tube.    EXAM:  CT BRAIN STROKE PROTOCOL                        PROCEDURE DATE:  2020    INTERPRETATION:  Amanda RAMIREZ MD, have reviewed the images and the report and agree with the findings, with the following modification:   There remains intravascular contrast within the arterial venous circulation. There is persistent gyriform areas of hyperdensity which may rest represent retained contrast versus hemorrhage and appears relatively stable.  There is ill-defined areas of hypodensity within the right insular with slight effacement of the right sylvian fissure which may represent early ischemic changes along the right MCA territory (series 2 images 12-14). Acute/subacute infarct on chronic infarcts would be better evaluated with a MRI with diffusion-weighted imaging.  ******PRELIMINARY REPORT******   PROCEDURE: CT head without intravenous contrast  INDICATIONS: Code stroke. Altered mental status. History of recent thrombectomy.  TECHNIQUE:  Serial axial images were obtained from the skull base to the vertex without the use of intravenous contrast. Coronal and sagittal reconstructions were created.  COMPARISON EXAMINATION: CT head dated 2020.  FINDINGS:    Compared to prior study from earlier in the day, there is no significant change in scattered gyriform and cortical hyperdensity in the right frontal and posterior temporal lobes. No acute transcortical infarction. No subarachnoid hemorrhage. Persistent intravascular contrast from prior imaging isagain noted. There is confluent hypodensity within the periventricular and subcortical white matter, likely the sequela of long-standing small vessel ischemic disease. No acute hydrocephalus, midline shift, or downward herniation.  There are no air-fluid levels in the paranasal sinuses. There is mild mucosal thickening within the bilateral maxillary sinuses. The mastoid air cells are well aerated.  IMPRESSION:   Compared to prior study from earlier in the day, no significant change in scattered enhancement of the right frontal and posterior temporal lobes, likely secondary to subacute infarction. Recommend correlation with prior noncontrast head CT to exclude hemorrhage.  The study was completed at 6:01 PM and the findings were discussed with ZONIA Guerrero at 6:08 PM on 2020.    EXAM:  CT PERFUSION W MAPS IC                        PROCEDURE DATE:  2020    INTERPRETATION:  PROCEDURE: CT Perfusionwith intravenous contrast.  INDICATION: Aphasia  TECHNIQUE: Following the intravenous administration of 40 ml of Optiray 350, serial axial images were obtained through the brain. The CT perfusion data set was post processed per [facility] protocol generating color maps of CBF, CBV, MTT, and Tmax.  COMPARISON: None  FINDINGS: The CT perfusion study demonstrates a large wedge shaped area of elevated MTT involving the right frontal, temporal and parietal lobes. There is slight corresponding decrease in CBF with relative preservation of rCBV. The calculated RAPID CBF volume < 30% is 0 ml and a Tmax volume > 6 seconds is 44 ml within the right frontal and parietal lobes. The mismatch volume is 44 ml and mismatch ratio is infinite.  IMPRESSION: Abnormal perfusion with RAPID calculated mismatch volume of 44 ml and mismatch ratio is infinite withinthe right MCA territory.    EXAM:  CT ANGIO BRAIN (W)AW IC                        EXAM:  CT ANGIO NECK (W)AW IC                        PROCEDURE DATE:  2020    INTERPRETATION:  PROCEDURE: CTA brain with and without intravenous contrast.  INDICATION: Aphasia  TECHNIQUE: Multiple thin section axial images were obtained through the [place] following the intravenous injection of 80 ml of Optiray 350. Multiple 3-D reformatted images were generated from the axial cuts.    COMPARISON: None  CORRELATION: CT head 2012  FINDINGS: The CTA examination demonstrates the right internal carotid artery to be normal in caliber. There is a normal bifurcation into the right A1 and M1 segments. There is abrupt cut off of the right M1 segment with approximately 4 mm from its origin with a somewhat tapered appearance. The noncontrast CT demonstrates presumed retained material within the right middle cerebral artery (series 5 images 31-33). This may be secondary to underlying severe stenosis with delayed filling rather than occlusion. There is a paucity of intraluminal contrast identified within the right insula and within right MCA cortical branches is somewhat irregular appearance. There is focal area of moderate narrowing involving the proximal left A1 segment at its origin (series 10 image 37).  The left internal carotid artery is normal in caliber. There is a normal bifurcation into the left A1 and M1 segments. There is a focal area of high-grade stenosis involving the left M1 segment (series 10 image 40 and series 7 image 256). There is a normal left MCA bifurcation.  There is tortuosity of the vertebrobasilar system. The right vertebral artery is dominant with the left being underdeveloped. The basilar artery is normal in caliber. There is a normal bifurcation into the posterior cerebral arteries. The right P1 segment is aplastic with a prominent right posterior communicating artery supplying the right PCA compatible with a fetal origin.  Please see concurrent CT had for intracranial findings.  IMPRESSION: Probable high-grade stenosis of the right M1 segment rather than occlusion. Multifocal areas of narrowing involving the right A1 and left M1 segment which may be secondary to intracranial atherosclerosis.  PROCEDURE: CTA neck with and without intravenous contrast.  INDICATION: Aphasia  TECHNIQUE: Multiple axial thin section were obtained through the neck following the intravenous injection of 80 ml of Optiray 350. Multiple 3-D reformatted images weregenerated from the axial cuts.    COMPARISON: None  FINDINGS: The CTA examination demonstrates the right common carotid artery to be normal in caliber. There is a normal bifurcation into the right internal and external carotid arteries. There is no hemodynamially significant stenosis. There is a retropharyngeal course to the right internal carotid artery.  The left common carotid artery is normal in caliber. There is a normal bifurcation into the left internal and external carotid arteries. There is no hemodynamically significant stenosis.   The right vertebral artery is dominant with the left being underdeveloped.  The aortic arch appears intact without narrowing of the origin of the great vessels.  IMPRESSION: Normal CTA of theneck.    EXAM:  CT BRAIN STROKE PROTOCOL                        PROCEDURE DATE:  2020    INTERPRETATION:  PROCEDURE: CT head without intravenous contrast  CLINICAL INDICATION: Stroke code, right-sided weakness, outside CTA shows left MCA occlusion as per stroke PA.  TECHNIQUE: Multiple axial images were obtained and viewed at 5 mm intervals from the skull base to the vertex. The images were reviewed in brain and bone windows. Sagittal and coronal reformations are provided.  COMPARISON: None  FINDINGS:   Intravascular contrast is noted, persisting from recent prior imaging. There is gyriform and cortical hyperdensity, presumed enhancement, of scattered cortex of the right frontal and posterior temporal lobes. This is favored to be subacute infarction injury. Without prior noncontrast imaging, parenchymal hemorrhage cannot be excluded. No gross subarachnoid, cisternal or intraventricular hemorrhage. There are small sites of right frontal and temporal lobe encephalomalacia from more chronic infarction as well. Gray to white matter differentiation of the left cerebral hemisphere appears preserved without evidence of recent left side transcortical infarction.   There is advanced small vessel ischemic change with confluent hypoattenuation in periventricular and subcortical white matter and numerous hypodense foci of throughout the deep gray nuclei, asymmetric to the left at the thalamic level. There is mild enlargement of the lateral and third ventricles, likely due to central volume loss given degree of small vessel ischemic change. No evidence of acute hydrocephalus, midline shift or downward herniation.   Bone window images show mild ethmoid sinus mucosal thickening. No calvarial fracture or destructive lesion.  IMPRESSION:   Gyriform sites of cortical hyperdensity, presumed contrast enhancement, are favored to be subacute sites of infarction, superimposed on more chronic sites of right MCA infarction. Please correlate with outside noncontrast head CTto ensure this is not hemorrhage.  No visible transcortical infarction on the left side at this time.  Case reviewed with ZONIA Guerrero 2:25 PM at 2019. Images were acquired at 2:21 PM.    EXAM:  MR BRAIN                        PROCEDURE DATE:  2020    INTERPRETATION:  I, Kemar Richards MD, have reviewed the images and the report and agree with the findings, with the following modification:   Technique: NoncontrastMRI of the brain using dedicated stroke protocol including axial diffusion weighted imaging, axial flair and axial GRE were obtained.  Agree with the findings of a large right MCA territorial infarction involving the right frontal and temporal lobes and the right basal ganglia. There is mass effect on the right lateral ventricle without significant midline shift.  Evaluation for hemorrhage is severely limited due to extensive motion artifact. There is apparent susceptibility related signal loss within the right thalamus and corona radiata which may be artifactual due to motion. Continued follow-up is recommended.  Agree with the finding of a chronic left parietal infarction.  ******PRELIMINARY REPORT******   PROCEDURE: MRI BRAIN without contrast  INDICATION: MCA occlusion, stroke.  TECHNIQUE: Axial T2, FLAIR and diffusion weighted images of the brain were obtained.  COMPARISON: Comparison is made to prior CT of the ead from 2020.  FINDINGS: Limited study due to motion artifact. The MRI examination of the brain demonstrates the ventricles, cisternal spaces, and cortical sulci to be appropriate for the patient's stated age. There is an area of consolidation involving the left parietal lobe. Abnormal diffusion-weighted high intensity signal is seen in the right basal ganglia, posterior frontal lobe and temporal lobe, indicative of MCA territory infarct. There are also periventricular hyperintense foci consistent with small vessel ischemic disease. Unable to asses flow-voids. The visualized paranasal sinuses are free of mucosal disease. The mastoid air cells demonstrate hyperintense signal in the right mastoid air cell.  IMPRESSION: Limited study due to motion artifact, however there is right MCA territory infarct involving the basal ganglia, frontal lobe and temporal lobe as described above. There is also chronic encephalomalacia seen in the left parietal lobe, likely from prior infarction. Small vessel ischemic disease.    Echocardiogram w/ Bubble and Doppler (20 @ 13:20)   CONCLUSIONS:   1. There is mild concentric left ventricular hypertrophy. The left ventricle is normal in size and systolic function with a calculated ejection fraction of >75%. Hyperdynamic left ventricular systolic function with cavity obliteration resulting in an intra-cavitary gradient of 49 mmHg.   2. The right ventricle is normal in size. Right ventricular systolic function is normal.   3. Trace mitral regurgitation.   4. There is no evidence of tricuspid regurgitation.   5. There was insufficient tricuspid regurgitation detected from which to calculate pulmonary artery systolic pressure.   6. No pericardial effusion is seen.   7. Nondiagnostic bubble study.Left Ventricle:              Normal - Men Normal - Women  IVSd (2D):         1.22 cm    (0.6-1.0)     (0.6-0.9)  LVPWd (2D):        1.10cm    (0.6-1.0)     (0.6-0.9)  LVIDd (2D):        3.51 cm    (4.2-5.8)     (3.8-5.2)  LVIDd i BSA (2D): 1.85 cm/m²  (2.2-3.0)     (2.3-3.1)  LVIDs (2D):        2.43 cm  LV FS (2D):         30.8 %      (>25%)  LV EF (MOD BP):      72 %       (>55%)    PHYSICAL EXAM:  T(C): 36.8 (20 @ 05:30), Max: 36.8 (20 @ 22:15)  HR: 78 (20 @ 11:35) (66 - 80)  BP: 139/77 (20 @ 11:35) (101/60 - 177/88)  RR: 18 (20 @ 11:35) (18 - 20)  SpO2: 93% (20 @ 11:35) (93% - 99%)  Wt(kg): 82.3kg    General:  man laying in bed in NAD  HEENT: NC/AT; PERRL, Non icteric, clear conjunctiva Avoiding sight from light source. Dry lips NGT+feeds  Neck: supple, no JVD,   Cardiovascular: +S1/S2; RRR, no M/R/G  Respiratory: CTA b/l; no W/R/R  Gastrointestinal: soft, NT/ND; +BSx4   : Voiding freely  Extremities: WWP; 2+ peripheral pulses; no edema   Neurological: AAOx0; not able to follow commands, or respond to name, has spontaneous movements of the right upper extremity and right lower extremity, unable to move his left hand and leg.   Psych: Calm  Skin: Non jaundiced   Lymph: Normal  Preadmit Karnofsky: 100%           Current Karnofsky: 40%  Cachexia (Y/N): No  BMI: 31.2    ADVANCED DIRECTIVES:     Full Code       No documented HCP form found on [facility]     No Living will / POA / Advance directives found on [place] / [facility].     No documented GOC notes on [place]     Decision maker: The patient does not demonstrate capacity for complex medical decision making given medical issues.   Legal surrogate: No documented HCP in paper chart/[place]/[facility]. No information on marital status or children. Documented information in the chart for brother Tim Madrid [phone] and "cousins" Brien Dicktristian [phone], and Rafa Kevin [phone]. Uncertain if patient's parents are alive. If they are they would be the next legal surrogates. If they are not then the brother and cousins would share on decision making.    GOALS OF CARE DISCUSSION:       Palliative care info/counseling provided	             REFERRALS:	        Palliative Med        Unit SW/Case Mgmt       Speech/Swallow       Nutrition/Dietician       PT/OT

## 2020-02-07 NOTE — PROGRESS NOTE ADULT - PROBLEM SELECTOR PLAN 4
#Overflow incontinence:   Patient with increase urinary output, concern for overflow incontinence.  - c/w Doxazosin 1mg 2mg qd at bedtime   - goldberg discontinued today   - failed TOV, so continue with bladder scans and straight cath q6 Patient presenting with Na from 145 to 151 in the last 48 hours. Likely secondary to pO intake in the setting of inability to tolerate oral intake.   - C/w with tube feeds with Jevity  - Free water flushes 250 cc q 4 hours

## 2020-02-07 NOTE — PROGRESS NOTE ADULT - PROBLEM SELECTOR PLAN 9
1.       PCP Contacted on Admission: (Y/N) --> Name & Phone #:  2.       Date of Contact with PCP:  3.       PCP Contacted at Discharge: (Y/N)  4.       Summary of Handoff Given to PCP:  5.       Post-Discharge Appointment Date and Location: [facility] [email or web address]]

## 2020-02-07 NOTE — PROGRESS NOTE ADULT - PROBLEM SELECTOR PLAN 6
F: No fluids   E: Replete K < 4.0, and Mg <2.0   N: Jevity  SCD: Lovenox #Overflow incontinence:   Patient with increase urinary output, concern for overflow incontinence.  - c/w Doxazosin 1mg 2mg qd at bedtime   - goldberg discontinued today   - failed TOV, so continue with bladder scans and straight cath q6

## 2020-02-07 NOTE — PROGRESS NOTE ADULT - SUBJECTIVE AND OBJECTIVE BOX
OVERNIGHT EVENTS: Patient pulled out his NG tube last night, and it was replaced and confirmed by X-ray. He also pulled out his toenail. He was placed on mittens for safety. No other acute events.     SUBJECTIVE / INTERVAL HPI: Patient seen and examined at bedside. Patient this morning appear to be resting comfortably, in NAD.     VITAL SIGNS:  Vital Signs Last 24 Hrs  T(C): 36.8 (07 Feb 2020 05:30), Max: 36.8 (06 Feb 2020 22:15)  T(F): 98.2 (07 Feb 2020 05:30), Max: 98.2 (06 Feb 2020 22:15)  HR: 66 (07 Feb 2020 08:31) (66 - 80)  BP: 101/60 (07 Feb 2020 08:31) (101/60 - 177/88)  BP(mean): 75 (07 Feb 2020 08:31) (75 - 122)  RR: 18 (07 Feb 2020 08:31) (18 - 20)  SpO2: 95% (07 Feb 2020 08:31) (93% - 99%)    REVIEW OF SYSTEMS:  Unable to obtain given mental status.     PHYSICAL EXAM:  General: Middle aged man in NAD, appears to be resting comfortably   HEENT: NC/AT; PERRL, clear conjunctiva  Neck: supple, no JVD,   Cardiovascular: +S1/S2; RRR, no M/R/G  Respiratory: CTA b/l; no W/R/R  Gastrointestinal: soft, NT/ND; +BSx4, NG tube in place   Extremities: WWP; 2+ peripheral pulses; no edema   Neurological: AAOx0; not able to follow commands, or respond to name, has spontaneous movements of the right upper extremity and right lower extremity, unable to move his left hand and leg.     MEDICATIONS:  MEDICATIONS  (STANDING):  amLODIPine   Tablet 10 milliGRAM(s) Oral every 24 hours  aspirin  chewable 81 milliGRAM(s) Oral daily  atorvastatin 80 milliGRAM(s) Oral at bedtime  carvedilol 6.25 milliGRAM(s) Oral every 12 hours  clopidogrel Tablet 75 milliGRAM(s) Oral daily  doxazosin 4 milliGRAM(s) Oral at bedtime  enalapril 10 milliGRAM(s) Oral every 24 hours  enoxaparin Injectable 40 milliGRAM(s) SubCutaneous every 24 hours  levETIRAcetam  Solution 500 milliGRAM(s) Enteral Tube every 12 hours  nystatin Powder 1 Application(s) Topical two times a day    MEDICATIONS  (PRN):  acetaminophen    Suspension .. 650 milliGRAM(s) Oral every 8 hours PRN Temp greater or equal to 38C (100.4F)      ALLERGIES:  Allergies    Allergy Status Unknown    Intolerances        LABS:                        15.1   6.48  )-----------( 188      ( 07 Feb 2020 07:18 )             44.8     02-07    151<H>  |  113<H>  |  26<H>  ----------------------------<  123<H>  3.9   |  25  |  1.42<H>    Ca    9.7      07 Feb 2020 07:18  Mg     2.3     02-07          CAPILLARY BLOOD GLUCOSE          RADIOLOGY & ADDITIONAL TESTS: Reviewed. Transfer Note from  to [facility]:     64M with PMH of dementia, HTN and CVA (2 months ago) transferred from [facility] s/p possible stroke (left facial and eyelid drooling and speech slurring transferred for thrombectomy as patient outside window of tPA. CTH/brain and perfusion imaging negative for stroke on admission. Patient s/p aborted thrombectomy, due to likely chronic strokes, as posterior collaterals noted on angiogram. Patient requiring emergent re-intubation following thrombectomy for airway protection and concern for possible stroke in setting of left extremity tremor. Repeat stroke workup negative, with concern for seizure. Patient Keppra loaded, with initiation of Keppra 500mg BID. vEEG without evidence of seizure activity, however, will continue Keppra given repeated noted left sided tremor. Patient successfully extubated, however. non-arousable, somnolent, and lethargic - concern for stroke vs. seizure activity with resultant metabolic encephalopathy. Hospital course complicated by persistent hypertension, initially requiring nicardipine gtt, now since discontinued. Home amlodipine 5mg increased to 10mg, with initiation of enalapril and coreg and blood pressures have been <SBP goal of 180. Patient's family is now in discussion with palliative, for PEG tube placement.  Patient is medically optimized for step-down to [facility].       OVERNIGHT EVENTS: Patient pulled out his NG tube last night, and it was replaced and confirmed by X-ray. He also pulled out his toenail. He was placed on mittens for safety. No other acute events.     SUBJECTIVE / INTERVAL HPI: Patient seen and examined at bedside. Patient this morning appear to be resting comfortably, in NAD.     VITAL SIGNS:  Vital Signs Last 24 Hrs  T(C): 36.8 (07 Feb 2020 05:30), Max: 36.8 (06 Feb 2020 22:15)  T(F): 98.2 (07 Feb 2020 05:30), Max: 98.2 (06 Feb 2020 22:15)  HR: 66 (07 Feb 2020 08:31) (66 - 80)  BP: 101/60 (07 Feb 2020 08:31) (101/60 - 177/88)  BP(mean): 75 (07 Feb 2020 08:31) (75 - 122)  RR: 18 (07 Feb 2020 08:31) (18 - 20)  SpO2: 95% (07 Feb 2020 08:31) (93% - 99%)    REVIEW OF SYSTEMS:  Unable to obtain given mental status.     PHYSICAL EXAM:  General: Middle aged man in NAD, appears to be resting comfortably   HEENT: NC/AT; PERRL, clear conjunctiva  Neck: supple, no JVD,   Cardiovascular: +S1/S2; RRR, no M/R/G  Respiratory: CTA b/l; no W/R/R  Gastrointestinal: soft, NT/ND; +BSx4, NG tube in place   Extremities: WWP; 2+ peripheral pulses; no edema   Neurological: AAOx0; not able to follow commands, or respond to name, has spontaneous movements of the right upper extremity and right lower extremity, unable to move his left hand and leg.     MEDICATIONS:  MEDICATIONS  (STANDING):  amLODIPine   Tablet 10 milliGRAM(s) Oral every 24 hours  aspirin  chewable 81 milliGRAM(s) Oral daily  atorvastatin 80 milliGRAM(s) Oral at bedtime  carvedilol 6.25 milliGRAM(s) Oral every 12 hours  clopidogrel Tablet 75 milliGRAM(s) Oral daily  doxazosin 4 milliGRAM(s) Oral at bedtime  enalapril 10 milliGRAM(s) Oral every 24 hours  enoxaparin Injectable 40 milliGRAM(s) SubCutaneous every 24 hours  levETIRAcetam  Solution 500 milliGRAM(s) Enteral Tube every 12 hours  nystatin Powder 1 Application(s) Topical two times a day    MEDICATIONS  (PRN):  acetaminophen    Suspension .. 650 milliGRAM(s) Oral every 8 hours PRN Temp greater or equal to 38C (100.4F)      ALLERGIES:  Allergies    Allergy Status Unknown    Intolerances        LABS:                        15.1   6.48  )-----------( 188      ( 07 Feb 2020 07:18 )             44.8     02-07    151<H>  |  113<H>  |  26<H>  ----------------------------<  123<H>  3.9   |  25  |  1.42<H>    Ca    9.7      07 Feb 2020 07:18  Mg     2.3     02-07          CAPILLARY BLOOD GLUCOSE          RADIOLOGY & ADDITIONAL TESTS: Reviewed.

## 2020-02-07 NOTE — CONSULT NOTE ADULT - PROBLEM SELECTOR RECOMMENDATION 3
-Continue current medications Currently on Amlodipine, Carvedilol, Doxazosin, and Enalapril.     Management as per primary team.

## 2020-02-08 LAB
ALBUMIN SERPL ELPH-MCNC: 3.5 G/DL — SIGNIFICANT CHANGE UP (ref 3.3–5)
ALP SERPL-CCNC: 61 U/L — SIGNIFICANT CHANGE UP (ref 40–120)
ALT FLD-CCNC: 48 U/L — HIGH (ref 10–45)
ANION GAP SERPL CALC-SCNC: 12 MMOL/L — SIGNIFICANT CHANGE UP (ref 5–17)
AST SERPL-CCNC: 57 U/L — HIGH (ref 10–40)
BILIRUB DIRECT SERPL-MCNC: <0.2 MG/DL — SIGNIFICANT CHANGE UP (ref 0–0.2)
BILIRUB INDIRECT FLD-MCNC: >0.1 MG/DL — LOW (ref 0.2–1)
BILIRUB SERPL-MCNC: 0.3 MG/DL — SIGNIFICANT CHANGE UP (ref 0.2–1.2)
BUN SERPL-MCNC: 32 MG/DL — HIGH (ref 7–23)
CALCIUM SERPL-MCNC: 9.7 MG/DL — SIGNIFICANT CHANGE UP (ref 8.4–10.5)
CHLORIDE SERPL-SCNC: 115 MMOL/L — HIGH (ref 96–108)
CO2 SERPL-SCNC: 21 MMOL/L — LOW (ref 22–31)
CREAT SERPL-MCNC: 1.69 MG/DL — HIGH (ref 0.5–1.3)
CULTURE RESULTS: SIGNIFICANT CHANGE UP
CULTURE RESULTS: SIGNIFICANT CHANGE UP
GLUCOSE SERPL-MCNC: 124 MG/DL — HIGH (ref 70–99)
HCT VFR BLD CALC: 41.7 % — SIGNIFICANT CHANGE UP (ref 39–50)
HGB BLD-MCNC: 14.2 G/DL — SIGNIFICANT CHANGE UP (ref 13–17)
MAGNESIUM SERPL-MCNC: 2.3 MG/DL — SIGNIFICANT CHANGE UP (ref 1.6–2.6)
MCHC RBC-ENTMCNC: 32.2 PG — SIGNIFICANT CHANGE UP (ref 27–34)
MCHC RBC-ENTMCNC: 34.1 GM/DL — SIGNIFICANT CHANGE UP (ref 32–36)
MCV RBC AUTO: 94.6 FL — SIGNIFICANT CHANGE UP (ref 80–100)
NRBC # BLD: 0 /100 WBCS — SIGNIFICANT CHANGE UP (ref 0–0)
NT-PROBNP SERPL-SCNC: 42 PG/ML — SIGNIFICANT CHANGE UP (ref 0–300)
PLATELET # BLD AUTO: 196 K/UL — SIGNIFICANT CHANGE UP (ref 150–400)
POTASSIUM SERPL-MCNC: 3.7 MMOL/L — SIGNIFICANT CHANGE UP (ref 3.5–5.3)
POTASSIUM SERPL-SCNC: 3.7 MMOL/L — SIGNIFICANT CHANGE UP (ref 3.5–5.3)
PROT SERPL-MCNC: 6 G/DL — SIGNIFICANT CHANGE UP (ref 6–8.3)
RBC # BLD: 4.41 M/UL — SIGNIFICANT CHANGE UP (ref 4.2–5.8)
RBC # FLD: 13 % — SIGNIFICANT CHANGE UP (ref 10.3–14.5)
SODIUM SERPL-SCNC: 148 MMOL/L — HIGH (ref 135–145)
SPECIMEN SOURCE: SIGNIFICANT CHANGE UP
SPECIMEN SOURCE: SIGNIFICANT CHANGE UP
WBC # BLD: 7.01 K/UL — SIGNIFICANT CHANGE UP (ref 3.8–10.5)
WBC # FLD AUTO: 7.01 K/UL — SIGNIFICANT CHANGE UP (ref 3.8–10.5)

## 2020-02-08 PROCEDURE — 71045 X-RAY EXAM CHEST 1 VIEW: CPT | Mod: 26

## 2020-02-08 PROCEDURE — 99232 SBSQ HOSP IP/OBS MODERATE 35: CPT

## 2020-02-08 RX ORDER — SODIUM CHLORIDE 9 MG/ML
500 INJECTION, SOLUTION INTRAVENOUS ONCE
Refills: 0 | Status: COMPLETED | OUTPATIENT
Start: 2020-02-08 | End: 2020-02-08

## 2020-02-08 RX ORDER — MODAFINIL 200 MG/1
100 TABLET ORAL
Refills: 0 | Status: DISCONTINUED | OUTPATIENT
Start: 2020-02-09 | End: 2020-02-14

## 2020-02-08 RX ORDER — MODAFINIL 200 MG/1
100 TABLET ORAL ONCE
Refills: 0 | Status: DISCONTINUED | OUTPATIENT
Start: 2020-02-08 | End: 2020-02-08

## 2020-02-08 RX ORDER — MODAFINIL 200 MG/1
200 TABLET ORAL EVERY 24 HOURS
Refills: 0 | Status: DISCONTINUED | OUTPATIENT
Start: 2020-02-08 | End: 2020-02-08

## 2020-02-08 RX ORDER — POTASSIUM CHLORIDE 20 MEQ
40 PACKET (EA) ORAL ONCE
Refills: 0 | Status: COMPLETED | OUTPATIENT
Start: 2020-02-08 | End: 2020-02-08

## 2020-02-08 RX ORDER — LEVETIRACETAM 250 MG/1
500 TABLET, FILM COATED ORAL ONCE
Refills: 0 | Status: DISCONTINUED | OUTPATIENT
Start: 2020-02-08 | End: 2020-02-08

## 2020-02-08 RX ORDER — LABETALOL HCL 100 MG
10 TABLET ORAL ONCE
Refills: 0 | Status: DISCONTINUED | OUTPATIENT
Start: 2020-02-08 | End: 2020-02-08

## 2020-02-08 RX ADMIN — Medication 40 MILLIEQUIVALENT(S): at 11:28

## 2020-02-08 RX ADMIN — CLOPIDOGREL BISULFATE 75 MILLIGRAM(S): 75 TABLET, FILM COATED ORAL at 11:28

## 2020-02-08 RX ADMIN — CARVEDILOL PHOSPHATE 6.25 MILLIGRAM(S): 80 CAPSULE, EXTENDED RELEASE ORAL at 09:09

## 2020-02-08 RX ADMIN — MODAFINIL 100 MILLIGRAM(S): 200 TABLET ORAL at 06:08

## 2020-02-08 RX ADMIN — MODAFINIL 100 MILLIGRAM(S): 200 TABLET ORAL at 11:28

## 2020-02-08 RX ADMIN — AMLODIPINE BESYLATE 10 MILLIGRAM(S): 2.5 TABLET ORAL at 06:08

## 2020-02-08 RX ADMIN — NYSTATIN CREAM 1 APPLICATION(S): 100000 CREAM TOPICAL at 06:08

## 2020-02-08 RX ADMIN — Medication 81 MILLIGRAM(S): at 11:28

## 2020-02-08 RX ADMIN — SODIUM CHLORIDE 166.67 MILLILITER(S): 9 INJECTION, SOLUTION INTRAVENOUS at 10:01

## 2020-02-08 RX ADMIN — ENOXAPARIN SODIUM 40 MILLIGRAM(S): 100 INJECTION SUBCUTANEOUS at 22:20

## 2020-02-08 RX ADMIN — LEVETIRACETAM 500 MILLIGRAM(S): 250 TABLET, FILM COATED ORAL at 09:09

## 2020-02-08 RX ADMIN — NYSTATIN CREAM 1 APPLICATION(S): 100000 CREAM TOPICAL at 17:36

## 2020-02-08 NOTE — PROGRESS NOTE ADULT - PROBLEM SELECTOR PLAN 5
Patient has slightly rising Creatine to 1.42, from baseline 0.93 likely in the setting of poor oral intake.   - F/u urine lytes   - Trend BMP   - Avoid nephrotoxic medications Patient has slightly rising Creatine from baseline 0.93 likely in the setting of poor oral intake.   - F/u urine lytes   - Trend BMP   - Avoid nephrotoxic medications

## 2020-02-08 NOTE — PROGRESS NOTE ADULT - ASSESSMENT
65 y/o M with PMHx of HTN and CVA (2 months ago) transferred from [facility] s/p stroke (left facial and eyelid drooling and speech slurring presenting for possible thrombectomy, as patient outside window for tPA - c/b reintubation following thrombectomy, now with metabolic encephalopathy, concern for seizure vs acute stroke. 63 y/o M with PMHx of HTN and CVA (2 months ago) transferred from [facility] s/p stroke (left facial and eyelid drooling and speech slurring presenting for possible thrombectomy, as patient outside window for tPA - c/b reintubation following thrombectomy, now with metabolic encephalopathy, concern for chronic vs acute stroke.

## 2020-02-08 NOTE — PROGRESS NOTE ADULT - PROBLEM SELECTOR PLAN 4
Patient presenting with Na from 145 to 151 in the last 48 hours. Likely secondary to pO intake in the setting of inability to tolerate oral intake.   - C/w with tube feeds with Jevity  - Free water flushes 250 cc q 4 hours Likely secondary to pO intake in the setting of inability to tolerate oral intake.   - C/w with tube feeds with Jevity  - Free water flushes 250 cc q 4 hours  - discontinue FWF when Na in normal range

## 2020-02-08 NOTE — PROGRESS NOTE ADULT - PROBLEM SELECTOR PLAN 9
1.       PCP Contacted on Admission: (Y/N) --> Name & Phone #:  2.       Date of Contact with PCP:  3.       PCP Contacted at Discharge: (Y/N)  4.       Summary of Handoff Given to PCP:  5.       Post-Discharge Appointment Date and Location: [facility]

## 2020-02-08 NOTE — PROGRESS NOTE ADULT - SUBJECTIVE AND OBJECTIVE BOX
THIS NOTE IS INCOMPLETE    OVERNIGHT EVENTS:    SUBJECTIVE / INTERVAL HPI: Patient seen and examined at bedside.     VITAL SIGNS:  Vital Signs Last 24 Hrs  T(C): 36.7 (08 Feb 2020 12:18), Max: 37.1 (08 Feb 2020 01:58)  T(F): 98.1 (08 Feb 2020 12:18), Max: 98.8 (08 Feb 2020 01:58)  HR: 77 (08 Feb 2020 12:18) (66 - 77)  BP: 124/61 (08 Feb 2020 12:18) (114/65 - 155/77)  BP(mean): 82 (08 Feb 2020 08:13) (82 - 108)  RR: 18 (08 Feb 2020 12:18) (16 - 18)  SpO2: 97% (08 Feb 2020 12:18) (94% - 97%)    PHYSICAL EXAM:    General: WDWN  HEENT: NC/AT; PERRL, anicteric sclera; MMM  Neck: supple  Cardiovascular: +S1/S2; RRR  Respiratory: CTA B/L; no W/R/R  Gastrointestinal: soft, NT/ND; +BSx4  Extremities: WWP; no edema, clubbing or cyanosis  Vascular: 2+ radial, DP/PT pulses B/L  Neurological: AAOx3; no focal deficits    MEDICATIONS:  MEDICATIONS  (STANDING):  amLODIPine   Tablet 10 milliGRAM(s) Oral every 24 hours  aspirin  chewable 81 milliGRAM(s) Oral daily  atorvastatin 80 milliGRAM(s) Oral at bedtime  carvedilol 6.25 milliGRAM(s) Oral every 12 hours  clopidogrel Tablet 75 milliGRAM(s) Oral daily  doxazosin 4 milliGRAM(s) Oral at bedtime  enalapril 10 milliGRAM(s) Oral every 24 hours  enoxaparin Injectable 40 milliGRAM(s) SubCutaneous every 24 hours  levETIRAcetam  Solution 500 milliGRAM(s) Enteral Tube every 12 hours  modafinil 100 milliGRAM(s) Oral <User Schedule>  nystatin Powder 1 Application(s) Topical two times a day    MEDICATIONS  (PRN):  acetaminophen    Suspension .. 650 milliGRAM(s) Oral every 8 hours PRN Temp greater or equal to 38C (100.4F)      ALLERGIES:  Allergies    Allergy Status Unknown    Intolerances        LABS:                        14.2   7.01  )-----------( 196      ( 08 Feb 2020 06:34 )             41.7     02-08    148<H>  |  115<H>  |  32<H>  ----------------------------<  124<H>  3.7   |  21<L>  |  1.69<H>    Ca    9.7      08 Feb 2020 06:34  Mg     2.3     02-08          CAPILLARY BLOOD GLUCOSE              RADIOLOGY & ADDITIONAL TESTS: Reviewed.      ASSESSMENT:    PLAN: THIS NOTE IS INCOMPLETE  Hospital Course:  64M with PMH of dementia, HTN and CVA (2 months ago) transferred from [facility] s/p possible stroke (left facial and eyelid drooling and speech slurring). Patient was transferred to [facility] for thrombectomy as patient outside window of tPA. CTH/brain and perfusion imaging negative for stroke on admission. Patient had posterior collaterals on angiogram, making chronic ischemia a more likely etiology of presentation, so thrombectomy aborted. However, patient emergently intubation following thrombectomy for airway protection and concern for possible stroke in setting of new left extremity tremor. Repeat stroke workup negative, however with elevated concern for seizure. Patient Keppra loaded, with initiation of Keppra 500mg BID. vEEG without evidence of seizure activity, however, will continue Keppra given new left sided tremor. Patient successfully extubated, however, non-arousable, somnolent, and lethargic, encephalopathic. Hospital course complicated by persistent hypertension, initially requiring nicardipine gtt. Home amlodipine 5mg increased to 10mg, with initiation of enalapril and coreg and blood pressures. Patient's family discussing PEG tube placement with palliative care team.  Patient is medically optimized for step-down to [facility].     SUBJECTIVE / INTERVAL HPI: Patient seen and examined at bedside.     VITAL SIGNS:  Vital Signs Last 24 Hrs  T(C): 36.7 (08 Feb 2020 12:18), Max: 37.1 (08 Feb 2020 01:58)  T(F): 98.1 (08 Feb 2020 12:18), Max: 98.8 (08 Feb 2020 01:58)  HR: 77 (08 Feb 2020 12:18) (66 - 77)  BP: 124/61 (08 Feb 2020 12:18) (114/65 - 155/77)  BP(mean): 82 (08 Feb 2020 08:13) (82 - 108)  RR: 18 (08 Feb 2020 12:18) (16 - 18)  SpO2: 97% (08 Feb 2020 12:18) (94% - 97%)    PHYSICAL EXAM:    General: WDWN  HEENT: NC/AT; PERRL, anicteric sclera; MMM  Neck: supple  Cardiovascular: +S1/S2; RRR  Respiratory: CTA B/L; no W/R/R  Gastrointestinal: soft, NT/ND; +BSx4  Extremities: WWP; no edema, clubbing or cyanosis  Vascular: 2+ radial, DP/PT pulses B/L  Neurological: AAOx3; no focal deficits    MEDICATIONS:  MEDICATIONS  (STANDING):  amLODIPine   Tablet 10 milliGRAM(s) Oral every 24 hours  aspirin  chewable 81 milliGRAM(s) Oral daily  atorvastatin 80 milliGRAM(s) Oral at bedtime  carvedilol 6.25 milliGRAM(s) Oral every 12 hours  clopidogrel Tablet 75 milliGRAM(s) Oral daily  doxazosin 4 milliGRAM(s) Oral at bedtime  enalapril 10 milliGRAM(s) Oral every 24 hours  enoxaparin Injectable 40 milliGRAM(s) SubCutaneous every 24 hours  levETIRAcetam  Solution 500 milliGRAM(s) Enteral Tube every 12 hours  modafinil 100 milliGRAM(s) Oral <User Schedule>  nystatin Powder 1 Application(s) Topical two times a day    MEDICATIONS  (PRN):  acetaminophen    Suspension .. 650 milliGRAM(s) Oral every 8 hours PRN Temp greater or equal to 38C (100.4F)      ALLERGIES:  Allergies    Allergy Status Unknown    Intolerances        LABS:                        14.2   7.01  )-----------( 196      ( 08 Feb 2020 06:34 )             41.7     02-08    148<H>  |  115<H>  |  32<H>  ----------------------------<  124<H>  3.7   |  21<L>  |  1.69<H>    Ca    9.7      08 Feb 2020 06:34  Mg     2.3     02-08          CAPILLARY BLOOD GLUCOSE              RADIOLOGY & ADDITIONAL TESTS: Reviewed.      ASSESSMENT:    PLAN: Hospital Course:  64M with PMH of dementia, HTN and CVA (2 months ago) transferred from [facility] s/p possible stroke (left facial and eyelid drooling and speech slurring). Patient was transferred to [facility] for thrombectomy as patient outside window of tPA. CTH/brain and perfusion imaging negative for stroke on admission. Patient had posterior collaterals on angiogram, making chronic ischemia a more likely etiology of presentation, so thrombectomy aborted. However, patient emergently intubation following thrombectomy for airway protection and concern for possible stroke in setting of new left extremity tremor. Repeat stroke workup negative, however with elevated concern for seizure. Patient Keppra loaded, with initiation of Keppra 500mg BID. vEEG without evidence of seizure activity, however, will continue Keppra given new left sided tremor. Patient successfully extubated, however, non-arousable, somnolent, and lethargic, encephalopathic. Hospital course complicated by persistent hypertension, initially requiring nicardipine gtt. Home amlodipine 5mg increased to 10mg, with initiation of enalapril and coreg and blood pressures. Patient's family discussing PEG tube placement with palliative care team.  Patient is medically optimized for step-down to [facility].     SUBJECTIVE / INTERVAL HPI: Patient seen and examined at bedside. He tracks with his eyes, responds to pain and moves his right side spontaneously but does not communicate, does not shake his head yes/no to questioning.    VITAL SIGNS:  Vital Signs Last 24 Hrs  T(C): 36.7 (08 Feb 2020 12:18), Max: 37.1 (08 Feb 2020 01:58)  T(F): 98.1 (08 Feb 2020 12:18), Max: 98.8 (08 Feb 2020 01:58)  HR: 77 (08 Feb 2020 12:18) (66 - 77)  BP: 124/61 (08 Feb 2020 12:18) (114/65 - 155/77)  BP(mean): 82 (08 Feb 2020 08:13) (82 - 108)  RR: 18 (08 Feb 2020 12:18) (16 - 18)  SpO2: 97% (08 Feb 2020 12:18) (94% - 97%)    PHYSICAL EXAM:  General: middle aged  male laying in bed in NAD squirming with his right upper and lower limb  HEENT: PERRL, anicteric sclera; MMM, NGT in place  Neck: supple  Cardiovascular: +S1/S2; RRR  Respiratory: CTA B/L; no W/R/R, good respiratory effort, comfortable on room air  Gastrointestinal: soft, NT to deep palpation/ND; +BSx4 normoactive  : goldberg in place draining concentrated urine  Extremities: WWP; edema more prominent on the left side in UE, bilateral lower extremity edema, wearing zyflo boots  Vascular: 2+ radial, DP/PT pulses B/L  Neurological: AAOx0; +eye tracking, responds to painful stimulus, spontaneously moves right UE, Right LE    MEDICATIONS  (STANDING):  amLODIPine   Tablet 10 milliGRAM(s) Oral every 24 hours  aspirin  chewable 81 milliGRAM(s) Oral daily  atorvastatin 80 milliGRAM(s) Oral at bedtime  carvedilol 6.25 milliGRAM(s) Oral every 12 hours  clopidogrel Tablet 75 milliGRAM(s) Oral daily  doxazosin 4 milliGRAM(s) Oral at bedtime  enalapril 10 milliGRAM(s) Oral every 24 hours  enoxaparin Injectable 40 milliGRAM(s) SubCutaneous every 24 hours  levETIRAcetam  Solution 500 milliGRAM(s) Enteral Tube every 12 hours  modafinil 100 milliGRAM(s) Oral <User Schedule>  nystatin Powder 1 Application(s) Topical two times a day    MEDICATIONS  (PRN):  acetaminophen    Suspension .. 650 milliGRAM(s) Oral every 8 hours PRN Temp greater or equal to 38C (100.4F)    ALLERGIES:  Allergy Status Unknown    LABS:                        14.2   7.01  )-----------( 196      ( 08 Feb 2020 06:34 )             41.7     02-08    148<H>  |  115<H>  |  32<H>  ----------------------------<  124<H>  3.7   |  21<L>  |  1.69<H>    Ca    9.7      08 Feb 2020 06:34  Mg     2.3     02-08    CAPILLARY BLOOD GLUCOSE      RADIOLOGY & ADDITIONAL TESTS: Reviewed.

## 2020-02-08 NOTE — PROGRESS NOTE ADULT - PROBLEM SELECTOR PLAN 2
History of CVA, Per medical records patient with CVA, 2 months ago. 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   - Patient now more alert at times, though still lethargic/minimally responsive. Vocalizing but nothing discernable. Moves LE b/l and RUE.    - c/w ASA 81mg PO daily and Plavix 75mg PO daily  - c/w Atorvastatin 80mg PO daily  - holding off on LOC for now given marginal respiratory status History of CVA, Per medical records patient with CVA, 2 months ago. 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   - c/w ASA 81mg PO daily and Plavix 75mg PO daily  - c/w Atorvastatin 80mg PO daily  - holding off on LOC for now given marginal respiratory status History of CVA, Per medical records patient with CVA, 2 months ago. 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   - c/w ASA 81mg PO daily and Plavix 75mg PO daily  - c/w Atorvastatin 80mg PO daily History of CVA, Per medical records patient with CVA, 2 months ago. 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 No LOC.   - c/w ASA 81mg PO daily and Plavix 75mg PO daily  - c/w Atorvastatin 80mg PO daily

## 2020-02-08 NOTE — PROGRESS NOTE ADULT - PROBLEM SELECTOR PLAN 4
Patient Na beginning to trend down  - C/w with tube feeds with Jevity  - Free water flushes 250 cc q 4 hours

## 2020-02-08 NOTE — PROGRESS NOTE ADULT - PROBLEM SELECTOR PLAN 3
Patient with reported history of hypertension. Patient on amlodipine 5mg outpatient. BP on arrival SBP >200. BP difficult to control initially requiring cardipine gtt for management.   - c/w home amlodipine increased to 10mg qd  - c/w enalapril 10mg qd  - c/w Coreg 6.25mg BID   - may give labatelol 10mg IV push or Lasix 20mg IV push if continued episodes of HTN   - c/w doxazosin 2mg for BPH, however may contribute to resolving hypertension   - SBP goal <180 SBP goal <180  - c/w home amlodipine increased to 10mg qd  - c/w enalapril 10mg qd  - c/w Coreg 6.25mg BID   - may give labatelol 10mg IV push or Lasix 20mg IV push if continued episodes of HTN   - c/w doxazosin 2mg for BPH, however may contribute to resolving hypertension   - follow up secondary HTN workup with RP US, AM cortisol, TSH, urine metanephrines, renin/chester ratio, Ca (hyperparathyroidism). SBP goal <180  - c/w home amlodipine increased to 10mg qd  - c/w enalapril 10mg qd  - c/w Coreg 6.25mg BID   - may give labatelol 10mg IV push or Lasix 20mg IV push if continued episodes of HTN   - c/w doxazosin 2mg for BPH, however may contribute to resolving hypertension

## 2020-02-08 NOTE — PROGRESS NOTE ADULT - SUBJECTIVE AND OBJECTIVE BOX
Transfer Note from  to [facility]:     64M with PMH of dementia, HTN and CVA (2 months ago) transferred from [facility] s/p possible stroke (left facial and eyelid drooling and speech slurring transferred for thrombectomy as patient outside window of tPA. CTH/brain and perfusion imaging negative for stroke on admission. Patient s/p aborted thrombectomy, due to likely chronic strokes, as posterior collaterals noted on angiogram. Patient requiring emergent re-intubation following thrombectomy for airway protection and concern for possible stroke in setting of left extremity tremor. Repeat stroke workup negative, with concern for seizure. Patient Keppra loaded, with initiation of Keppra 500mg BID. vEEG without evidence of seizure activity, however, will continue Keppra given repeated noted left sided tremor. Patient successfully extubated, however. non-arousable, somnolent, and lethargic - concern for stroke vs. seizure activity with resultant metabolic encephalopathy. Hospital course complicated by persistent hypertension, initially requiring nicardipine gtt, now since discontinued. Home amlodipine 5mg increased to 10mg, with initiation of enalapril and coreg and blood pressures have been <SBP goal of 180. Patient's family is now in discussion with palliative, for PEG tube placement.  Patient is medically optimized for step-down to [facility].       OVERNIGHT EVENTS: RODY    SUBJECTIVE / INTERVAL HPI: Patient seen and examined at bedside. Patient this morning appear to be resting comfortably, in NAD. Does not follow commands and unable to obtain ROS    ICU Vital Signs Last 24 Hrs  T(C): 36.4 (08 Feb 2020 06:00), Max: 37.1 (08 Feb 2020 01:58)  T(F): 97.5 (08 Feb 2020 06:00), Max: 98.8 (08 Feb 2020 01:58)  HR: 74 (08 Feb 2020 08:13) (66 - 78)  BP: 114/65 (08 Feb 2020 08:13) (114/65 - 155/77)  BP(mean): 82 (08 Feb 2020 08:13) (82 - 108)  ABP: --  ABP(mean): --  RR: 18 (08 Feb 2020 08:13) (16 - 18)  SpO2: 96% (08 Feb 2020 08:13) (93% - 97%)       PHYSICAL EXAM:  General: Middle aged man in NAD, appears to be resting comfortably, not following commands  HEENT: NC/AT; PERRL, clear conjunctiva  Neck: supple   Cardiovascular: +S1/S2; RRR   Respiratory: CTA b/l; no W/R/R  Gastrointestinal: soft, NT/ND; +BSx4, NG tube in place   Extremities: WWP; 2+ peripheral pulses; no edema   Neurological: AAOx0; not able to follow commands      MEDICATIONS  (STANDING):  amLODIPine   Tablet 10 milliGRAM(s) Oral every 24 hours  aspirin  chewable 81 milliGRAM(s) Oral daily  atorvastatin 80 milliGRAM(s) Oral at bedtime  carvedilol 6.25 milliGRAM(s) Oral every 12 hours  clopidogrel Tablet 75 milliGRAM(s) Oral daily  doxazosin 4 milliGRAM(s) Oral at bedtime  enalapril 10 milliGRAM(s) Oral every 24 hours  enoxaparin Injectable 40 milliGRAM(s) SubCutaneous every 24 hours  lactated ringers Bolus 500 milliLiter(s) IV Bolus once  levETIRAcetam  Solution 500 milliGRAM(s) Enteral Tube every 12 hours  modafinil 100 milliGRAM(s) Oral every 24 hours  nystatin Powder 1 Application(s) Topical two times a day  potassium chloride   Powder 40 milliEquivalent(s) Enteral Tube once    MEDICATIONS  (PRN):  acetaminophen    Suspension .. 650 milliGRAM(s) Oral every 8 hours PRN Temp greater or equal to 38C (100.4F)      .  LABS:                         14.2   7.01  )-----------( 196      ( 08 Feb 2020 06:34 )             41.7     02-08    148<H>  |  115<H>  |  32<H>  ----------------------------<  124<H>  3.7   |  21<L>  |  1.69<H>    Ca    9.7      08 Feb 2020 06:34  Mg     2.3     02-08                    RADIOLOGY, EKG & ADDITIONAL TESTS: Reviewed.     RADIOLOGY & ADDITIONAL TESTS: Reviewed.

## 2020-02-08 NOTE — PROGRESS NOTE ADULT - PROBLEM SELECTOR PLAN 5
Patient has rising creatinine, urine lytes/FENA consistent w/ pre-renal   - will bolus LR 500cc/3 hours (pt already hyperchloremic)   - Trend BMP   - Avoid nephrotoxic medications

## 2020-02-08 NOTE — PROGRESS NOTE ADULT - SUBJECTIVE AND OBJECTIVE BOX
Neurology Stroke Progress Note    INTERVAL HPI/OVERNIGHT EVENTS:  Patient seen and examined. Opens eyes to voice today, moves right leg on top of left, otherwise does not follow commands.     MEDICATIONS  (STANDING):  amLODIPine   Tablet 10 milliGRAM(s) Oral every 24 hours  aspirin  chewable 81 milliGRAM(s) Oral daily  atorvastatin 80 milliGRAM(s) Oral at bedtime  carvedilol 6.25 milliGRAM(s) Oral every 12 hours  clopidogrel Tablet 75 milliGRAM(s) Oral daily  doxazosin 4 milliGRAM(s) Oral at bedtime  enalapril 10 milliGRAM(s) Oral every 24 hours  enoxaparin Injectable 40 milliGRAM(s) SubCutaneous every 24 hours  levETIRAcetam  Solution 500 milliGRAM(s) Enteral Tube every 12 hours  modafinil 100 milliGRAM(s) Oral every 24 hours  nystatin Powder 1 Application(s) Topical two times a day  potassium chloride   Powder 40 milliEquivalent(s) Enteral Tube once    MEDICATIONS  (PRN):  acetaminophen    Suspension .. 650 milliGRAM(s) Oral every 8 hours PRN Temp greater or equal to 38C (100.4F)      Allergies    Allergy Status Unknown    Intolerances        ROS: As per HPI, otherwise negative    Vital Signs Last 24 Hrs  T(C): 36.4 (08 Feb 2020 06:00), Max: 37.1 (08 Feb 2020 01:58)  T(F): 97.5 (08 Feb 2020 06:00), Max: 98.8 (08 Feb 2020 01:58)  HR: 74 (08 Feb 2020 08:13) (66 - 78)  BP: 114/65 (08 Feb 2020 08:13) (114/65 - 155/77)  BP(mean): 82 (08 Feb 2020 08:13) (82 - 108)  RR: 18 (08 Feb 2020 08:13) (16 - 18)  SpO2: 96% (08 Feb 2020 08:13) (93% - 97%)    Physical exam:  Mental status: Lethargic, not speaking. Opens eyes to repeated stimuli. Does not follows commands.     Cranial Nerves:  II: Visual fields are full to confrontation. Pupils equally round and reactive to light b/l.   III, IV, VI: Does not track with eyes.   VII: No facial droop, face symmetric with normal eye closure and smile.  Motor: Moves rightleg spontaneously antigravity, 3/5. Right arm minimal movement Left arm no movement. No movement seen on left leg  Sensation: Decreased sensation of left side  Coordination: Unable to cooperate.   Reflexes: L upgoing toe, R equivocal toe.   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 Second stroke code called for intermittent jerking and extremity posturing. Repeat CTH unchanged, vEEG negative.  MRI brain revealed right MCA infarct. Patient extubated 2/2/2020, but now lethargic and not following commands. Improved wakefulness today. Stable for step down to [facility]    1)Secondary stroke prevention  -Continue ASA 81mg PO daily and Plavix 75mg PO daily   -Continue Atorvastatin 80mg PO daily     2) Stroke risk factors  -HTN - c/w home Amlodipine   -Prev CVA (baseline dysarthria and left hand weakness)    3) Further workup   -Due to severe reaction to benzos, there is a concern for safety of doing LOC as patient would likely receive versed during this procedure; however, LOC would be beneficial for stroke workup as it could potentially  if there is a clot in the left atrium. Given pt's continued altered mental status, will continue to hold off on LOC  - Continue Keppra 500mg BID  -Can increase modafenil to twice a day - 7 am and 1 pm  - PT/OT rec for AR  -Will need PEG tube placement    DVT prophylaxis   -Lovenox SQ and SCDs Neurology Stroke Progress Note    INTERVAL HPI/OVERNIGHT EVENTS:  Patient seen and examined. Opens eyes to voice today, moves right leg on top of left, otherwise does not follow commands.     MEDICATIONS  (STANDING):  amLODIPine   Tablet 10 milliGRAM(s) Oral every 24 hours  aspirin  chewable 81 milliGRAM(s) Oral daily  atorvastatin 80 milliGRAM(s) Oral at bedtime  carvedilol 6.25 milliGRAM(s) Oral every 12 hours  clopidogrel Tablet 75 milliGRAM(s) Oral daily  doxazosin 4 milliGRAM(s) Oral at bedtime  enalapril 10 milliGRAM(s) Oral every 24 hours  enoxaparin Injectable 40 milliGRAM(s) SubCutaneous every 24 hours  levETIRAcetam  Solution 500 milliGRAM(s) Enteral Tube every 12 hours  modafinil 100 milliGRAM(s) Oral every 24 hours  nystatin Powder 1 Application(s) Topical two times a day  potassium chloride   Powder 40 milliEquivalent(s) Enteral Tube once    MEDICATIONS  (PRN):  acetaminophen    Suspension .. 650 milliGRAM(s) Oral every 8 hours PRN Temp greater or equal to 38C (100.4F)      Allergies  Allergy Status Unknown    ROS: As per HPI, otherwise negative    Vital Signs Last 24 Hrs  T(C): 36.4 (08 Feb 2020 06:00), Max: 37.1 (08 Feb 2020 01:58)  T(F): 97.5 (08 Feb 2020 06:00), Max: 98.8 (08 Feb 2020 01:58)  HR: 74 (08 Feb 2020 08:13) (66 - 78)  BP: 114/65 (08 Feb 2020 08:13) (114/65 - 155/77)  BP(mean): 82 (08 Feb 2020 08:13) (82 - 108)  RR: 18 (08 Feb 2020 08:13) (16 - 18)  SpO2: 96% (08 Feb 2020 08:13) (93% - 97%)    Physical exam:  Mental status: Lethargic, not speaking. Opens eyes to repeated stimuli. Does not follows commands.     Cranial Nerves:  II: Visual fields are full to confrontation. Pupils equally round and reactive to light b/l.   III, IV, VI: Does not track with eyes.   VII: No facial droop, face symmetric with normal eye closure and smile.  Motor: Moves rightleg spontaneously antigravity, 3/5. Right arm minimal movement Left arm no movement. No movement seen on left leg  Sensation: Decreased sensation of left side  Coordination: Unable to cooperate.   Reflexes: L upgoing toe, R equivocal toe.   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 Second stroke code called for intermittent jerking and extremity posturing. Repeat CTH unchanged, vEEG negative.  MRI brain revealed right MCA infarct. Patient extubated 2/2/2020, but now lethargic and not following commands. Improved wakefulness today. Stable for step down to [facility]    1)Secondary stroke prevention  -Continue ASA 81mg PO daily and Plavix 75mg PO daily   -Continue Atorvastatin 80mg PO daily     2) Stroke risk factors  -HTN - c/w home Amlodipine   -Prev CVA (baseline dysarthria and left hand weakness)    3) Further workup   -Due to severe reaction to benzos, there is a concern for safety of doing LOC as patient would likely receive versed during this procedure; however, LOC would be beneficial for stroke workup as it could potentially  if there is a clot in the left atrium. Given pt's continued altered mental status, will continue to hold off on LOC  - Continue Keppra 500mg BID  -Can increase modafenil to twice a day - 7 am and 1 pm  - PT/OT rec for AR  -Will need PEG tube placement    DVT prophylaxis   -Lovenox SQ and SCDs

## 2020-02-08 NOTE — PROGRESS NOTE ADULT - PROBLEM SELECTOR PLAN 9
1.       PCP Contacted on Admission: (Y/N) --> Name & Phone #:  2.       Date of Contact with PCP:  3.       PCP Contacted at Discharge: (Y/N)  4.       Summary of Handoff Given to PCP:  5.       Post-Discharge Appointment Date and Location: [facility]

## 2020-02-08 NOTE — PROGRESS NOTE ADULT - PROBLEM SELECTOR PLAN 1
#Metabolic Encephalopathy 2/2 stroke vs seizure (less likely):   Unclear etiology of events. CTH negative at [place] and [facility] (stroke code x2). s/p aborted thrombectomy with evidence of chronic ischemia. Repeat CT with evidence of contrast extravasation but no acute ischemic or hemorrhagic event. Patient with notable tremors and facial grimaces. VEEG showed no signs of seizure and MRI brain reveals R MCA infarct affecting the basal ganglia, frontal, and temporal lobes, likely etiology of metabolic encephalopathy.   - c/w Keppra 500mg BID  - minimize ativan and sedation  - epilepsy following, appreciate recs  - stroke team following appreciate recs  - PT recommending of acute rehab facility #Metabolic Encephalopathy 2/2 stroke vs seizure (less likely):   Unclear etiology of events. CTH negative at [place] and [facility] (stroke code x2).  VEEG showed no signs of seizure and MRI brain reveals R MCA infarct affecting the basal ganglia, frontal, and temporal lobes, likely etiology of metabolic encephalopathy.   - c/w Keppra 500mg BID  - minimize ativan and sedation  - epilepsy following, appreciate recs  - stroke team following appreciate recs  - PT recommending of acute rehab facility #Metabolic Encephalopathy 2/2 stroke vs seizure (less likely):   Unclear etiology of events. CTH negative at [place] and [facility] (stroke code x2).  VEEG showed no signs of seizure and MRI brain reveals R MCA infarct affecting the basal ganglia, frontal, and temporal lobes, likely etiology of metabolic encephalopathy.   - c/w Keppra 500mg BID  - minimize ativan and sedation  - stroke team following appreciate recs  - PT recommending of acute rehab facility  - palliative care team on board: currently full code discussing PEG  - start modafinil 100 BID #Metabolic Encephalopathy 2/2 stroke  CTH negative at [facility] (stroke code x2).  MRI brain reveals R MCA infarct affecting the basal ganglia, frontal, and temporal lobes, likely etiology of metabolic encephalopathy. vEEG negative for seizures.  - c/w Keppra 500mg BID given lower threshold for seizures  - seizure precautions, aspiration precautions  - minimize ativan and sedation  - stroke team following appreciate recs  - PT recommending of acute rehab facility  - palliative care team on board: currently full code discussing PEG  - start modafinil 200mg QD

## 2020-02-08 NOTE — PROGRESS NOTE ADULT - PROBLEM SELECTOR PLAN 7
Per family, patient with history of depression. Previously on sertaline, now on trazadone 100mg.  - holding home meds in setting of lethargy Per family, patient with history of depression. Previously on sertraline, now on trazadone 100mg.  - holding home meds in setting of lethargy

## 2020-02-08 NOTE — PROGRESS NOTE ADULT - PROBLEM SELECTOR PLAN 1
#Metabolic Encephalopathy 2/2 stroke   Unclear etiology of events. CTH negative at [place] and [facility] (stroke code x2). s/p aborted thrombectomy with evidence of chronic ischemia. Repeat CT with evidence of contrast extravasation but no acute ischemic or hemorrhagic event. Patient with notable tremors and facial grimaces. VEEG showed no signs of seizure and MRI brain reveals R MCA infarct affecting the basal ganglia, frontal, and temporal lobes, likely etiology of metabolic encephalopathy.   - c/w Keppra 500mg BID  - minimize ativan and sedation  - epilepsy following, appreciate recs  - stroke team following appreciate recs  - PT recommending of acute rehab facility

## 2020-02-08 NOTE — PROGRESS NOTE ADULT - ASSESSMENT
65 y/o M with PMHx of HTN and CVA (2 months ago) transferred from [facility] s/p stroke (left facial and eyelid drooling and speech slurring presenting for possible thrombectomy, as patient outside window for tPA - c/b reintubation following thrombectomy, now with metabolic encephalopathy 2/2 stroke

## 2020-02-09 LAB
ALBUMIN SERPL ELPH-MCNC: 3.2 G/DL — LOW (ref 3.3–5)
ALBUMIN SERPL ELPH-MCNC: 3.5 G/DL — SIGNIFICANT CHANGE UP (ref 3.3–5)
ALP SERPL-CCNC: 52 U/L — SIGNIFICANT CHANGE UP (ref 40–120)
ALP SERPL-CCNC: 55 U/L — SIGNIFICANT CHANGE UP (ref 40–120)
ALT FLD-CCNC: 37 U/L — SIGNIFICANT CHANGE UP (ref 10–45)
ALT FLD-CCNC: 43 U/L — SIGNIFICANT CHANGE UP (ref 10–45)
ANION GAP SERPL CALC-SCNC: 10 MMOL/L — SIGNIFICANT CHANGE UP (ref 5–17)
ANION GAP SERPL CALC-SCNC: 12 MMOL/L — SIGNIFICANT CHANGE UP (ref 5–17)
APPEARANCE UR: CLEAR — SIGNIFICANT CHANGE UP
AST SERPL-CCNC: 42 U/L — HIGH (ref 10–40)
AST SERPL-CCNC: 48 U/L — HIGH (ref 10–40)
BILIRUB SERPL-MCNC: 0.5 MG/DL — SIGNIFICANT CHANGE UP (ref 0.2–1.2)
BILIRUB SERPL-MCNC: 0.7 MG/DL — SIGNIFICANT CHANGE UP (ref 0.2–1.2)
BILIRUB UR-MCNC: NEGATIVE — SIGNIFICANT CHANGE UP
BUN SERPL-MCNC: 25 MG/DL — HIGH (ref 7–23)
BUN SERPL-MCNC: 26 MG/DL — HIGH (ref 7–23)
CALCIUM SERPL-MCNC: 8.9 MG/DL — SIGNIFICANT CHANGE UP (ref 8.4–10.5)
CALCIUM SERPL-MCNC: 9.2 MG/DL — SIGNIFICANT CHANGE UP (ref 8.4–10.5)
CHLORIDE SERPL-SCNC: 115 MMOL/L — HIGH (ref 96–108)
CHLORIDE SERPL-SCNC: 115 MMOL/L — HIGH (ref 96–108)
CO2 SERPL-SCNC: 22 MMOL/L — SIGNIFICANT CHANGE UP (ref 22–31)
CO2 SERPL-SCNC: 24 MMOL/L — SIGNIFICANT CHANGE UP (ref 22–31)
COLOR SPEC: YELLOW — SIGNIFICANT CHANGE UP
CREAT ?TM UR-MCNC: 180 MG/DL — SIGNIFICANT CHANGE UP
CREAT SERPL-MCNC: 1.43 MG/DL — HIGH (ref 0.5–1.3)
CREAT SERPL-MCNC: 1.56 MG/DL — HIGH (ref 0.5–1.3)
DIFF PNL FLD: NEGATIVE — SIGNIFICANT CHANGE UP
GLUCOSE SERPL-MCNC: 108 MG/DL — HIGH (ref 70–99)
GLUCOSE SERPL-MCNC: 129 MG/DL — HIGH (ref 70–99)
GLUCOSE UR QL: NEGATIVE — SIGNIFICANT CHANGE UP
HCT VFR BLD CALC: 39.4 % — SIGNIFICANT CHANGE UP (ref 39–50)
HCT VFR BLD CALC: 41.7 % — SIGNIFICANT CHANGE UP (ref 39–50)
HGB BLD-MCNC: 12.8 G/DL — LOW (ref 13–17)
HGB BLD-MCNC: 13.9 G/DL — SIGNIFICANT CHANGE UP (ref 13–17)
KETONES UR-MCNC: NEGATIVE — SIGNIFICANT CHANGE UP
LACTATE SERPL-SCNC: 0.9 MMOL/L — SIGNIFICANT CHANGE UP (ref 0.5–2)
LEUKOCYTE ESTERASE UR-ACNC: NEGATIVE — SIGNIFICANT CHANGE UP
MAGNESIUM SERPL-MCNC: 2.1 MG/DL — SIGNIFICANT CHANGE UP (ref 1.6–2.6)
MCHC RBC-ENTMCNC: 31.8 PG — SIGNIFICANT CHANGE UP (ref 27–34)
MCHC RBC-ENTMCNC: 32.2 PG — SIGNIFICANT CHANGE UP (ref 27–34)
MCHC RBC-ENTMCNC: 32.5 GM/DL — SIGNIFICANT CHANGE UP (ref 32–36)
MCHC RBC-ENTMCNC: 33.3 GM/DL — SIGNIFICANT CHANGE UP (ref 32–36)
MCV RBC AUTO: 96.5 FL — SIGNIFICANT CHANGE UP (ref 80–100)
MCV RBC AUTO: 98 FL — SIGNIFICANT CHANGE UP (ref 80–100)
NITRITE UR-MCNC: NEGATIVE — SIGNIFICANT CHANGE UP
NRBC # BLD: 0 /100 WBCS — SIGNIFICANT CHANGE UP (ref 0–0)
NRBC # BLD: 0 /100 WBCS — SIGNIFICANT CHANGE UP (ref 0–0)
PH UR: 6 — SIGNIFICANT CHANGE UP (ref 5–8)
PHOSPHATE SERPL-MCNC: 3.1 MG/DL — SIGNIFICANT CHANGE UP (ref 2.5–4.5)
PLATELET # BLD AUTO: 178 K/UL — SIGNIFICANT CHANGE UP (ref 150–400)
PLATELET # BLD AUTO: 196 K/UL — SIGNIFICANT CHANGE UP (ref 150–400)
POTASSIUM SERPL-MCNC: 4 MMOL/L — SIGNIFICANT CHANGE UP (ref 3.5–5.3)
POTASSIUM SERPL-MCNC: 4.5 MMOL/L — SIGNIFICANT CHANGE UP (ref 3.5–5.3)
POTASSIUM SERPL-SCNC: 4 MMOL/L — SIGNIFICANT CHANGE UP (ref 3.5–5.3)
POTASSIUM SERPL-SCNC: 4.5 MMOL/L — SIGNIFICANT CHANGE UP (ref 3.5–5.3)
PROT SERPL-MCNC: 5.5 G/DL — LOW (ref 6–8.3)
PROT SERPL-MCNC: 6.1 G/DL — SIGNIFICANT CHANGE UP (ref 6–8.3)
PROT UR-MCNC: 30 MG/DL
RBC # BLD: 4.02 M/UL — LOW (ref 4.2–5.8)
RBC # BLD: 4.32 M/UL — SIGNIFICANT CHANGE UP (ref 4.2–5.8)
RBC # FLD: 13.1 % — SIGNIFICANT CHANGE UP (ref 10.3–14.5)
RBC # FLD: 13.2 % — SIGNIFICANT CHANGE UP (ref 10.3–14.5)
SODIUM SERPL-SCNC: 149 MMOL/L — HIGH (ref 135–145)
SODIUM SERPL-SCNC: 149 MMOL/L — HIGH (ref 135–145)
SODIUM UR-SCNC: 84 MMOL/L — SIGNIFICANT CHANGE UP
SP GR SPEC: 1.02 — SIGNIFICANT CHANGE UP (ref 1–1.03)
TSH SERPL-MCNC: 0.9 UIU/ML — SIGNIFICANT CHANGE UP (ref 0.35–4.94)
UROBILINOGEN FLD QL: 0.2 E.U./DL — SIGNIFICANT CHANGE UP
UUN UR-MCNC: 1214 MG/DL — SIGNIFICANT CHANGE UP
WBC # BLD: 6.03 K/UL — SIGNIFICANT CHANGE UP (ref 3.8–10.5)
WBC # BLD: 6.34 K/UL — SIGNIFICANT CHANGE UP (ref 3.8–10.5)
WBC # FLD AUTO: 6.03 K/UL — SIGNIFICANT CHANGE UP (ref 3.8–10.5)
WBC # FLD AUTO: 6.34 K/UL — SIGNIFICANT CHANGE UP (ref 3.8–10.5)

## 2020-02-09 PROCEDURE — 99233 SBSQ HOSP IP/OBS HIGH 50: CPT

## 2020-02-09 PROCEDURE — 71045 X-RAY EXAM CHEST 1 VIEW: CPT | Mod: 26

## 2020-02-09 PROCEDURE — 99233 SBSQ HOSP IP/OBS HIGH 50: CPT | Mod: GC

## 2020-02-09 RX ORDER — SODIUM CHLORIDE 9 MG/ML
1000 INJECTION INTRAMUSCULAR; INTRAVENOUS; SUBCUTANEOUS ONCE
Refills: 0 | Status: COMPLETED | OUTPATIENT
Start: 2020-02-09 | End: 2020-02-09

## 2020-02-09 RX ORDER — PIPERACILLIN AND TAZOBACTAM 4; .5 G/20ML; G/20ML
3.38 INJECTION, POWDER, LYOPHILIZED, FOR SOLUTION INTRAVENOUS EVERY 6 HOURS
Refills: 0 | Status: DISCONTINUED | OUTPATIENT
Start: 2020-02-09 | End: 2020-02-10

## 2020-02-09 RX ORDER — PIPERACILLIN AND TAZOBACTAM 4; .5 G/20ML; G/20ML
3.38 INJECTION, POWDER, LYOPHILIZED, FOR SOLUTION INTRAVENOUS ONCE
Refills: 0 | Status: DISCONTINUED | OUTPATIENT
Start: 2020-02-09 | End: 2020-02-09

## 2020-02-09 RX ORDER — VANCOMYCIN HCL 1 G
1250 VIAL (EA) INTRAVENOUS EVERY 12 HOURS
Refills: 0 | Status: DISCONTINUED | OUTPATIENT
Start: 2020-02-09 | End: 2020-02-10

## 2020-02-09 RX ORDER — SODIUM CHLORIDE 9 MG/ML
500 INJECTION INTRAMUSCULAR; INTRAVENOUS; SUBCUTANEOUS
Refills: 0 | Status: DISCONTINUED | OUTPATIENT
Start: 2020-02-09 | End: 2020-02-11

## 2020-02-09 RX ADMIN — Medication 10 MILLIGRAM(S): at 03:33

## 2020-02-09 RX ADMIN — SODIUM CHLORIDE 1000 MILLILITER(S): 9 INJECTION INTRAMUSCULAR; INTRAVENOUS; SUBCUTANEOUS at 12:27

## 2020-02-09 RX ADMIN — MODAFINIL 100 MILLIGRAM(S): 200 TABLET ORAL at 12:49

## 2020-02-09 RX ADMIN — Medication 166.67 MILLIGRAM(S): at 16:30

## 2020-02-09 RX ADMIN — Medication 81 MILLIGRAM(S): at 12:49

## 2020-02-09 RX ADMIN — PIPERACILLIN AND TAZOBACTAM 200 GRAM(S): 4; .5 INJECTION, POWDER, LYOPHILIZED, FOR SOLUTION INTRAVENOUS at 18:43

## 2020-02-09 RX ADMIN — NYSTATIN CREAM 1 APPLICATION(S): 100000 CREAM TOPICAL at 18:43

## 2020-02-09 RX ADMIN — NYSTATIN CREAM 1 APPLICATION(S): 100000 CREAM TOPICAL at 06:48

## 2020-02-09 RX ADMIN — AMLODIPINE BESYLATE 10 MILLIGRAM(S): 2.5 TABLET ORAL at 09:35

## 2020-02-09 RX ADMIN — MODAFINIL 100 MILLIGRAM(S): 200 TABLET ORAL at 08:41

## 2020-02-09 RX ADMIN — LEVETIRACETAM 500 MILLIGRAM(S): 250 TABLET, FILM COATED ORAL at 09:35

## 2020-02-09 RX ADMIN — Medication 4 MILLIGRAM(S): at 03:39

## 2020-02-09 RX ADMIN — ATORVASTATIN CALCIUM 80 MILLIGRAM(S): 80 TABLET, FILM COATED ORAL at 03:33

## 2020-02-09 RX ADMIN — ATORVASTATIN CALCIUM 80 MILLIGRAM(S): 80 TABLET, FILM COATED ORAL at 21:13

## 2020-02-09 RX ADMIN — CARVEDILOL PHOSPHATE 6.25 MILLIGRAM(S): 80 CAPSULE, EXTENDED RELEASE ORAL at 03:32

## 2020-02-09 RX ADMIN — LEVETIRACETAM 500 MILLIGRAM(S): 250 TABLET, FILM COATED ORAL at 03:34

## 2020-02-09 RX ADMIN — CLOPIDOGREL BISULFATE 75 MILLIGRAM(S): 75 TABLET, FILM COATED ORAL at 12:49

## 2020-02-09 RX ADMIN — LEVETIRACETAM 500 MILLIGRAM(S): 250 TABLET, FILM COATED ORAL at 21:13

## 2020-02-09 RX ADMIN — ENOXAPARIN SODIUM 40 MILLIGRAM(S): 100 INJECTION SUBCUTANEOUS at 21:13

## 2020-02-09 RX ADMIN — SODIUM CHLORIDE 50 MILLILITER(S): 9 INJECTION INTRAMUSCULAR; INTRAVENOUS; SUBCUTANEOUS at 21:10

## 2020-02-09 RX ADMIN — Medication 650 MILLIGRAM(S): at 10:00

## 2020-02-09 RX ADMIN — CARVEDILOL PHOSPHATE 6.25 MILLIGRAM(S): 80 CAPSULE, EXTENDED RELEASE ORAL at 09:35

## 2020-02-09 RX ADMIN — Medication 650 MILLIGRAM(S): at 09:35

## 2020-02-09 RX ADMIN — PIPERACILLIN AND TAZOBACTAM 200 GRAM(S): 4; .5 INJECTION, POWDER, LYOPHILIZED, FOR SOLUTION INTRAVENOUS at 12:49

## 2020-02-09 NOTE — CHART NOTE - NSCHARTNOTEFT_GEN_A_CORE
New NG tube had been placed by dayteam resident on 2/8/20 around 9PM.   On assessment, pt in no acute distress and NG tube ~49cm, taped in place. Immediate CXR seen around midnight and confirmed with radiology that NG tube around JG junction and should be advanced 6-8cm.  NG tube advanced to 55cm at 12:45AM 2/9/20 and Urgent CXR ordered. New NG tube had been placed by day team senior resident on 2/8/20 around 9PM.     On assessment, pt in NAD and NG tube ~49cm, taped in place. Immediate CXR seen in PACs around midnight and confirmed with radiology that NG tube located at GE junction and should be advanced 6-8cm. NG tube advanced to ~55cm at 12:45AM 2/9/20 and Urgent CXR ordered. On repeat CXR per radiology good positioning and is overlying gastric bubble. Order placed for that NGT can be used (feeds and medicine).

## 2020-02-09 NOTE — PROGRESS NOTE ADULT - PROBLEM SELECTOR PLAN 2
History of CVA, Per medical records patient with CVA, 2 months ago. 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 No LOC.   - c/w ASA 81mg PO daily and Plavix 75mg PO daily  - c/w Atorvastatin 80mg PO daily

## 2020-02-09 NOTE — PROGRESS NOTE ADULT - SUBJECTIVE AND OBJECTIVE BOX
Pt seen and examined at bedside.    Awake, alert, oriented x 0.  NAD  NCAT, NGT in place  neck supple  s1s2 RRR  CTA b/l  abd soft NTND  ext 1+ b/l LE edema  skin w/w/p  moving RLE and RUE, not following commands    Complete Blood Count (02.09.20 @ 06:59)    Nucleated RBC: 0 /100 WBCs    WBC Count: 6.34 K/uL    RBC Count: 4.32 M/uL    Hemoglobin: 13.9 g/dL    Hematocrit: 41.7 %    Mean Cell Volume: 96.5 fl    Mean Cell Hemoglobin: 32.2 pg    Mean Cell Hemoglobin Conc: 33.3 gm/dL    Red Cell Distrib Width: 13.1 %    Platelet Count - Automated: 196 K/uL      Complete Blood Count (02.09.20 @ 06:59)    Nucleated RBC: 0 /100 WBCs    WBC Count: 6.34 K/uL    RBC Count: 4.32 M/uL    Hemoglobin: 13.9 g/dL    Hematocrit: 41.7 %    Mean Cell Volume: 96.5 fl    Mean Cell Hemoglobin: 32.2 pg    Mean Cell Hemoglobin Conc: 33.3 gm/dL    Red Cell Distrib Width: 13.1 %    Platelet Count - Automated: 196 K/uL    Comprehensive Metabolic Panel in AM (02.09.20 @ 06:58)    Sodium, Serum: 149 mmol/L    Potassium, Serum: 4.0 mmol/L    Chloride, Serum: 115 mmol/L    Carbon Dioxide, Serum: 22 mmol/L    Anion Gap, Serum: 12 mmol/L    Blood Urea Nitrogen, Serum: 25 mg/dL    Creatinine, Serum: 1.43 mg/dL    Glucose, Serum: 108 mg/dL    Calcium, Total Serum: 9.2 mg/dL    Protein Total, Serum: 6.1 g/dL    Albumin, Serum: 3.5 g/dL    Bilirubin Total, Serum: 0.7 mg/dL    Alkaline Phosphatase, Serum: 55 U/L    Aspartate Aminotransferase (AST/SGOT): 48 U/L    Alanine Aminotransferase (ALT/SGPT): 43 U/L    eGFR if Non : 52: The units for eGFR are ml/min/1.73m2 (normalized body surface area). The  eGFR is calculated from a serum creatinine using the CKD-EPI equation.  Other variables required for calculation are race, age and sex. Among  patients with chronic kidney disease (CKD), the eGFR is useful in  determining the stage of disease according to KDOQI CKD classification.  All eGFR results are reported numerically with the following  interpretation.          GFR                    With                        Without     (ml/min/1.73 m2)    Kidney Damage       Kidney Damage        >= 90                    Stage 1                     Normal        60-89                    Stage 2                     Decreased GFR        30-59                    Stage 3         Stage 3        15-29                    Stage 4                     Stage 4        < 15                      Stage 5                     Stage 5  Each stage of CKD assumes that the associated GFR level has been in  effect for at least 3 months. Determination of stages one and two (with  eGFR > 59 ml/min/m2) requires estimation of kidney damage for at least 3  months as defined by structural or functional abnormalities.  Limitations: All estimates of GFR will be less accurate for patientsat  extremes of muscle mass (including but not limited to frail elderly,  critically ill, or cancer patients), those with unusual diets, and those  with conditions associated with reduced secretion or extrarenal  elimination of creatinine. The eGFR equation is not recommended for use  in patients with unstable creatinine levels. mL/min/1.73M2    eGFR if : 60: The units for eGFR are ml/min/1.73m2 (normalized body surface area). The  eGFR is calculated from a serum creatinine using the CKD-EPI equation.  Other variables required for calculation are race, age and sex. Among  patients with chronic kidney disease (CKD), the eGFR is useful in  determining the stage of disease according to KDOQI CKD classification.  All eGFR results are reported numerically with the following  interpretation.          GFR                    With                        Without     (ml/min/1.73 m2)    Kidney Damage       Kidney Damage        >= 90                    Stage 1                     Normal        60-89                    Stage 2                     Decreased GFR        30-59                    Stage 3         Stage 3        15-29                    Stage 4                     Stage 4        < 15                      Stage 5                     Stage 5  Each stage of CKD assumes that the associated GFR level has been in  effect for at least 3 months. Determination of stages one and two (with  eGFR > 59 ml/min/m2) requires estimation of kidney damage for at least 3  months as defined by structural or functional abnormalities.  Limitations: All estimates of GFR will be less accurate for patientsat  extremes of muscle mass (including but not limited to frail elderly,  critically ill, or cancer patients), those with unusual diets, and those  with conditions associated with reduced secretion or extrarenal  elimination of creatinine. The eGFR equation is not recommended for use  in patients with unstable creatinine levels. mL/min/1.73M2

## 2020-02-09 NOTE — PROGRESS NOTE ADULT - ASSESSMENT
63 y/o M with PMHx of HTN and CVA (2 months ago) transferred from [facility] s/p stroke (left facial and eyelid drooling and speech slurring presenting for possible thrombectomy, as patient outside window for tPA - c/b reintubation following thrombectomy, now with metabolic encephalopathy 2/2 stroke

## 2020-02-09 NOTE — PROGRESS NOTE ADULT - PROBLEM SELECTOR PLAN 3
100.4 axillary today  -CXR without significant change.  -started vancomycin and zosyn renally dosed  -f/u UA, blood cx.

## 2020-02-09 NOTE — PROGRESS NOTE ADULT - PROBLEM SELECTOR PLAN 4
now controlled on enalapril, amlodipine, coreg  - may give labatelol 10mg IV push or Lasix 20mg IV push if episodes of HTN   - SBP goal <180

## 2020-02-09 NOTE — PROGRESS NOTE ADULT - PROBLEM SELECTOR PLAN 1
likely 2/2 R MCA infarct affecting the basal ganglia, frontal, and temporal lobes  -management per neuro  - c/w Keppra 500mg BID  - minimize ativan and sedation  - epilepsy following, appreciate recs  - stroke team following appreciate recs  - PT recommending of acute rehab facility

## 2020-02-09 NOTE — PROGRESS NOTE ADULT - PROBLEM SELECTOR PLAN 5
Likely secondary to pO intake in the setting of inability to tolerate oral intake.   - C/w with tube feeds with Jevity  - Free water flushes 250 cc q 4 hours  - discontinue FWF when Na in normal range

## 2020-02-09 NOTE — PROVIDER CONTACT NOTE (CHANGE IN STATUS NOTIFICATION) - BACKGROUND
65YO M, transferred from [facility], s/p Rt CVA, hx HTN, PAULY, Depression, Metabolic encephalopathy.

## 2020-02-09 NOTE — PROGRESS NOTE ADULT - PROBLEM SELECTOR PLAN 1
#Metabolic Encephalopathy 2/2 stroke  CTH negative at [facility] (stroke code x2).  MRI brain reveals R MCA infarct affecting the basal ganglia, frontal, and temporal lobes, likely etiology of metabolic encephalopathy. vEEG negative for seizures.  - c/w Keppra 500mg BID given lower threshold for seizures  - seizure precautions, aspiration precautions  - minimize ativan and sedation  - stroke team following appreciate recs  - PT recommending of acute rehab facility  - palliative care team on board: currently full code discussing PEG  - start modafinil 200mg QD

## 2020-02-09 NOTE — PROVIDER CONTACT NOTE (CHANGE IN STATUS NOTIFICATION) - ASSESSMENT
Unresponsive to sternal rub, appears asleep,VS Temp 98.1 axillary, HR 66, B/P 94/58, RR 16, SPo2 95% RA, patient baseline b/p 130s-140s

## 2020-02-09 NOTE — PROGRESS NOTE ADULT - PROBLEM SELECTOR PLAN 6
Patient has slightly rising Creatine from baseline 0.93 likely in the setting of poor oral intake.   - F/u urine lytes   - Trend BMP   - Avoid nephrotoxic medications

## 2020-02-09 NOTE — PROGRESS NOTE ADULT - PROBLEM SELECTOR PLAN 4
SBP goal <180  - c/w home amlodipine increased to 10mg qd  - c/w enalapril 10mg qd  - c/w Coreg 6.25mg BID   - may give labatelol 10mg IV push or Lasix 20mg IV push if continued episodes of HTN   - c/w doxazosin 2mg for BPH, however may contribute to resolving hypertension

## 2020-02-09 NOTE — PROGRESS NOTE ADULT - SUBJECTIVE AND OBJECTIVE BOX
Hospital Course:  64M with PMH of dementia, HTN and CVA (2 months ago) transferred from [facility] s/p possible stroke (left facial and eyelid drooling and speech slurring). Patient was transferred to [facility] for thrombectomy as patient outside window of tPA. CTH/brain and perfusion imaging negative for stroke on admission. Patient had posterior collaterals on angiogram, making chronic ischemia a more likely etiology of presentation, so thrombectomy aborted. However, patient emergently intubation following thrombectomy for airway protection and concern for possible stroke in setting of new left extremity tremor. Repeat stroke workup negative, however with elevated concern for seizure. Patient Keppra loaded, with initiation of Keppra 500mg BID. vEEG without evidence of seizure activity, however, will continue Keppra given new left sided tremor. Patient successfully extubated, however, non-arousable, somnolent, and lethargic, encephalopathic. Hospital course complicated by persistent hypertension, initially requiring nicardipine gtt. Home amlodipine 5mg increased to 10mg, with initiation of enalapril and coreg and blood pressures. Patient's family discussing PEG tube placement with palliative care team.  Patient is medically optimized for step-down to [facility] on . On , he had fever with 100.4 axillary temperature and was given tylenol. CXR was done and appeared clear. Shortly thereafter, in the early afternoon of , he became hypotensive to SBP 80s and difficult to arouse, not responding to sternal rub after he was AAOx0 this AM. He was given a 1L NS bolus and started on vancomycin and zosyn empirically.    SUBJECTIVE / INTERVAL HPI: Patient seen and examined at bedside. He tracks with his eyes, responds to pain and moves his right side spontaneously but does not communicate, does not shake his head yes/no to questioning.    VITAL SIGNS:  Vital Signs Last 24 Hrs  T(C): 36.8 (2020 14:45), Max: 38 (2020 09:30)  T(F): 98.2 (2020 14:45), Max: 100.4 (2020 09:30)  HR: 73 (2020 14:45) (57 - 85)  BP: 157/94 (2020 14:45) (94/58 - 157/94)  RR: 16 (2020 14:45) (16 - 16)  SpO2: 96% (2020 14:45) (94% - 98%)    PHYSICAL EXAM:  General: middle aged  male laying in bed in NAD squirming with his right upper and lower limb  HEENT: PERRL, anicteric sclera; MMM, NGT in place  Neck: supple  Cardiovascular: +S1/S2; RRR  Respiratory: CTA B/L; no W/R/R, good respiratory effort, comfortable on room air  Gastrointestinal: soft, NT to deep palpation/ND; +BSx4 normoactive  : goldberg in place draining concentrated urine  Extremities: WWP; edema more prominent on the left side in UE, bilateral lower extremity edema, wearing zyflo boots  Vascular: 2+ radial, DP/PT pulses B/L  Neurological: AAOx0; +eye tracking, responds to painful stimulus, spontaneously moves right UE, Right LE    MEDICATIONS  (STANDING):  aspirin  chewable 81 milliGRAM(s) Oral daily  atorvastatin 80 milliGRAM(s) Oral at bedtime  clopidogrel Tablet 75 milliGRAM(s) Oral daily  doxazosin 4 milliGRAM(s) Oral at bedtime  enoxaparin Injectable 40 milliGRAM(s) SubCutaneous every 24 hours  levETIRAcetam  Solution 500 milliGRAM(s) Enteral Tube every 12 hours  modafinil 100 milliGRAM(s) Oral <User Schedule>  nystatin Powder 1 Application(s) Topical two times a day  piperacillin/tazobactam IVPB.. 3.375 Gram(s) IV Intermittent every 6 hours  vancomycin  IVPB 1250 milliGRAM(s) IV Intermittent every 12 hours    MEDICATIONS  (PRN):  acetaminophen    Suspension .. 650 milliGRAM(s) Oral every 8 hours PRN Temp greater or equal to 38C (100.4F)      ALLERGIES:  Allergy Status Unknown      LABS:                         12.8   6.03  )-----------( 178      ( 2020 13:27 )             39.4     02-09    149<H>  |  115<H>  |  26<H>  ----------------------------<  129<H>  4.5   |  24  |  1.56<H>    Ca    8.9      2020 13:27  Phos  3.1     -  Mg     2.1         TPro  5.5<L>  /  Alb  3.2<L>  /  TBili  0.5  /  DBili  x   /  AST  42<H>  /  ALT  37  /  AlkPhos  52        Urinalysis Basic - ( 2020 12:39 )    Color: Yellow / Appearance: Clear / S.025 / pH: x  Gluc: x / Ketone: NEGATIVE  / Bili: Negative / Urobili: 0.2 E.U./dL   Blood: x / Protein: 30 mg/dL / Nitrite: NEGATIVE   Leuk Esterase: NEGATIVE / RBC: < 5 /HPF / WBC 5-10 /HPF   Sq Epi: x / Non Sq Epi: 0-5 /HPF / Bacteria: Present /HPF        Lactate, Blood: 0.9 mmol/L ( @ 13:22)      RADIOLOGY, EKG & ADDITIONAL TESTS: Reviewed.

## 2020-02-09 NOTE — PROGRESS NOTE ADULT - ASSESSMENT
63 y/o M with PMHx of HTN and CVA (2 months ago) transferred from [facility] s/p stroke (left facial and eyelid drooling and speech slurring presenting for possible thrombectomy, as patient outside window for tPA - c/b reintubation following thrombectomy, now with metabolic encephalopathy, concern for chronic vs acute stroke.

## 2020-02-09 NOTE — PROGRESS NOTE ADULT - PROBLEM SELECTOR PLAN 8
Per family, patient with history of depression. Previously on sertraline, now on trazadone 100mg.  - holding home meds in setting of lethargy

## 2020-02-09 NOTE — PROGRESS NOTE ADULT - PROBLEM SELECTOR PLAN 2
History of CVA, Per medical records patient with CVA, 2 months ago. 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    - c/w ASA 81mg PO daily and Plavix 75mg PO daily  - c/w Atorvastatin 80mg PO daily  - holding off on LOC for now given marginal respiratory status

## 2020-02-10 DIAGNOSIS — J69.0 PNEUMONITIS DUE TO INHALATION OF FOOD AND VOMIT: ICD-10-CM

## 2020-02-10 LAB
ALBUMIN SERPL ELPH-MCNC: 3.3 G/DL — SIGNIFICANT CHANGE UP (ref 3.3–5)
ALP SERPL-CCNC: 53 U/L — SIGNIFICANT CHANGE UP (ref 40–120)
ALT FLD-CCNC: 37 U/L — SIGNIFICANT CHANGE UP (ref 10–45)
ANION GAP SERPL CALC-SCNC: 11 MMOL/L — SIGNIFICANT CHANGE UP (ref 5–17)
APTT BLD: 27.1 SEC — LOW (ref 27.5–36.3)
AST SERPL-CCNC: 41 U/L — HIGH (ref 10–40)
BILIRUB SERPL-MCNC: 0.6 MG/DL — SIGNIFICANT CHANGE UP (ref 0.2–1.2)
BUN SERPL-MCNC: 20 MG/DL — SIGNIFICANT CHANGE UP (ref 7–23)
CALCIUM SERPL-MCNC: 8.8 MG/DL — SIGNIFICANT CHANGE UP (ref 8.4–10.5)
CHLORIDE SERPL-SCNC: 114 MMOL/L — HIGH (ref 96–108)
CO2 SERPL-SCNC: 21 MMOL/L — LOW (ref 22–31)
CREAT SERPL-MCNC: 1.44 MG/DL — HIGH (ref 0.5–1.3)
GLUCOSE SERPL-MCNC: 109 MG/DL — HIGH (ref 70–99)
HCT VFR BLD CALC: 40.2 % — SIGNIFICANT CHANGE UP (ref 39–50)
HGB BLD-MCNC: 13 G/DL — SIGNIFICANT CHANGE UP (ref 13–17)
INR BLD: 1.18 — HIGH (ref 0.88–1.16)
LEGIONELLA AG UR QL: NEGATIVE — SIGNIFICANT CHANGE UP
MAGNESIUM SERPL-MCNC: 2.1 MG/DL — SIGNIFICANT CHANGE UP (ref 1.6–2.6)
MCHC RBC-ENTMCNC: 31.5 PG — SIGNIFICANT CHANGE UP (ref 27–34)
MCHC RBC-ENTMCNC: 32.3 GM/DL — SIGNIFICANT CHANGE UP (ref 32–36)
MCV RBC AUTO: 97.3 FL — SIGNIFICANT CHANGE UP (ref 80–100)
NRBC # BLD: 0 /100 WBCS — SIGNIFICANT CHANGE UP (ref 0–0)
PHOSPHATE SERPL-MCNC: 2.6 MG/DL — SIGNIFICANT CHANGE UP (ref 2.5–4.5)
PLATELET # BLD AUTO: 183 K/UL — SIGNIFICANT CHANGE UP (ref 150–400)
POTASSIUM SERPL-MCNC: 3.9 MMOL/L — SIGNIFICANT CHANGE UP (ref 3.5–5.3)
POTASSIUM SERPL-SCNC: 3.9 MMOL/L — SIGNIFICANT CHANGE UP (ref 3.5–5.3)
PROT SERPL-MCNC: 5.8 G/DL — LOW (ref 6–8.3)
PROTHROM AB SERPL-ACNC: 13.5 SEC — HIGH (ref 10–12.9)
RBC # BLD: 4.13 M/UL — LOW (ref 4.2–5.8)
RBC # FLD: 12.9 % — SIGNIFICANT CHANGE UP (ref 10.3–14.5)
SODIUM SERPL-SCNC: 146 MMOL/L — HIGH (ref 135–145)
WBC # BLD: 7.16 K/UL — SIGNIFICANT CHANGE UP (ref 3.8–10.5)
WBC # FLD AUTO: 7.16 K/UL — SIGNIFICANT CHANGE UP (ref 3.8–10.5)

## 2020-02-10 PROCEDURE — 99233 SBSQ HOSP IP/OBS HIGH 50: CPT | Mod: GC

## 2020-02-10 PROCEDURE — 99233 SBSQ HOSP IP/OBS HIGH 50: CPT

## 2020-02-10 PROCEDURE — 99358 PROLONG SERVICE W/O CONTACT: CPT

## 2020-02-10 PROCEDURE — 99497 ADVNCD CARE PLAN 30 MIN: CPT

## 2020-02-10 PROCEDURE — 99231 SBSQ HOSP IP/OBS SF/LOW 25: CPT

## 2020-02-10 RX ORDER — LANOLIN ALCOHOL/MO/W.PET/CERES
5 CREAM (GRAM) TOPICAL AT BEDTIME
Refills: 0 | Status: DISCONTINUED | OUTPATIENT
Start: 2020-02-10 | End: 2020-02-18

## 2020-02-10 RX ORDER — AMPICILLIN SODIUM AND SULBACTAM SODIUM 250; 125 MG/ML; MG/ML
3 INJECTION, POWDER, FOR SUSPENSION INTRAMUSCULAR; INTRAVENOUS EVERY 6 HOURS
Refills: 0 | Status: COMPLETED | OUTPATIENT
Start: 2020-02-10 | End: 2020-02-16

## 2020-02-10 RX ADMIN — MODAFINIL 100 MILLIGRAM(S): 200 TABLET ORAL at 12:46

## 2020-02-10 RX ADMIN — PIPERACILLIN AND TAZOBACTAM 200 GRAM(S): 4; .5 INJECTION, POWDER, LYOPHILIZED, FOR SOLUTION INTRAVENOUS at 06:29

## 2020-02-10 RX ADMIN — Medication 166.67 MILLIGRAM(S): at 07:06

## 2020-02-10 RX ADMIN — AMPICILLIN SODIUM AND SULBACTAM SODIUM 200 GRAM(S): 250; 125 INJECTION, POWDER, FOR SUSPENSION INTRAMUSCULAR; INTRAVENOUS at 12:46

## 2020-02-10 RX ADMIN — Medication 81 MILLIGRAM(S): at 12:46

## 2020-02-10 RX ADMIN — LEVETIRACETAM 500 MILLIGRAM(S): 250 TABLET, FILM COATED ORAL at 21:24

## 2020-02-10 RX ADMIN — LEVETIRACETAM 500 MILLIGRAM(S): 250 TABLET, FILM COATED ORAL at 09:49

## 2020-02-10 RX ADMIN — AMPICILLIN SODIUM AND SULBACTAM SODIUM 200 GRAM(S): 250; 125 INJECTION, POWDER, FOR SUSPENSION INTRAMUSCULAR; INTRAVENOUS at 19:10

## 2020-02-10 RX ADMIN — AMPICILLIN SODIUM AND SULBACTAM SODIUM 200 GRAM(S): 250; 125 INJECTION, POWDER, FOR SUSPENSION INTRAMUSCULAR; INTRAVENOUS at 23:30

## 2020-02-10 RX ADMIN — NYSTATIN CREAM 1 APPLICATION(S): 100000 CREAM TOPICAL at 06:29

## 2020-02-10 RX ADMIN — NYSTATIN CREAM 1 APPLICATION(S): 100000 CREAM TOPICAL at 19:11

## 2020-02-10 RX ADMIN — MODAFINIL 100 MILLIGRAM(S): 200 TABLET ORAL at 07:06

## 2020-02-10 RX ADMIN — ATORVASTATIN CALCIUM 80 MILLIGRAM(S): 80 TABLET, FILM COATED ORAL at 21:24

## 2020-02-10 RX ADMIN — CLOPIDOGREL BISULFATE 75 MILLIGRAM(S): 75 TABLET, FILM COATED ORAL at 12:45

## 2020-02-10 RX ADMIN — PIPERACILLIN AND TAZOBACTAM 200 GRAM(S): 4; .5 INJECTION, POWDER, LYOPHILIZED, FOR SOLUTION INTRAVENOUS at 00:24

## 2020-02-10 NOTE — PROGRESS NOTE ADULT - PROBLEM SELECTOR PLAN 8
Per family, patient with history of depression. Previously on sertaline, now on trazadone 100mg.  - holding home meds in setting of lethargy Patient AAOx0, unable to make decisions. Next of kin is son in [facility]. Brother would like to take brother to [place] to pursue rehab.   - pending contact with brother  - consent for PEG in AM

## 2020-02-10 NOTE — CHART NOTE - NSCHARTNOTEFT_GEN_A_CORE
Admitting Diagnosis:   Patient is a 64y old  Male who presents with a chief complaint of [facility] for [facility] (10 Feb 2020 12:58)      PAST MEDICAL & SURGICAL HISTORY:  Hypertension  CVA (cerebral vascular accident)  No significant past surgical history      Current Nutrition Order:   Diet, NPO after Midnight:      NPO Start Date: 10-Feb-2020,   NPO Start Time: 23:59  Except Medications (02-10-20 @ 13:49)      PO Intake: Good (%) [   ]  Fair (50-75%) [   ] Poor (<25%) [   ]- N/A; Pt is NPO.     GI Issues:   No N/V/D/C noted per RN.   No TF residuals per flowsheet.   Unclear last BM date-RN confirms.     Pain: Unable to assess pain at this time.     Skin Integrity: Surgical incision noted; Edema 2+ left hand.    Labs:   02-10    146<H>  |  114<H>  |  20  ----------------------------<  109<H>  3.9   |  21<L>  |  1.44<H>    Ca    8.8      10 Feb 2020 06:23  Phos  2.6     02-10  Mg     2.1     02-10    TPro  5.8<L>  /  Alb  3.3  /  TBili  0.6  /  DBili  x   /  AST  41<H>  /  ALT  37  /  AlkPhos  53  02-10    CAPILLARY BLOOD GLUCOSE          Medications:  MEDICATIONS  (STANDING):  ampicillin/sulbactam  IVPB 3 Gram(s) IV Intermittent every 6 hours  aspirin  chewable 81 milliGRAM(s) Oral daily  atorvastatin 80 milliGRAM(s) Oral at bedtime  clopidogrel Tablet 75 milliGRAM(s) Oral daily  enoxaparin Injectable 40 milliGRAM(s) SubCutaneous every 24 hours  levETIRAcetam  Solution 500 milliGRAM(s) Enteral Tube every 12 hours  modafinil 100 milliGRAM(s) Oral <User Schedule>  nystatin Powder 1 Application(s) Topical two times a day  sodium chloride 0.9%. 500 milliLiter(s) (50 mL/Hr) IV Continuous <Continuous>    MEDICATIONS  (PRN):  acetaminophen    Suspension .. 650 milliGRAM(s) Oral every 8 hours PRN Temp greater or equal to 38C (100.4F)      Weight:  (1/31) 82.3 kg     Weight Change: No new wts to assess at this time.     Nutrition Focused Physical Exam: Completed [   ]  Not Pertinent [ X  ]    Estimated energy needs:   Ht: 5'8" (estimated), Wt: 82.3kg, IBW: 70kg, 117.5%IBW.   *Pt w/ updated height documented as 5'4" (as of 2/4/20). ?Accuracy. Will continue to use ABW (82.3kg) for calculations as pt w/ unclear ht.    Needs calculated based on [facility] standards of care. Needs adjusted for age  20-25kcal/kg= 1646-2057kcal  1.0-1.2gms pro/kg= 82-98gms protein    Subjective:   65 y/o M with PMHx of HTN and CVA (2 months ago) transferred from [facility] s/p stroke (left facial and eyelid drooling and speech slurring) presenting for possible thrombectomy, as patient outside window for tPA. 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 Patient was brought to the MICU but after procedural sedation began to wear off, appeared to be contracted with intermittent left leg jerking. Second SC called, patient intubated, and repeat CTH remained unchanged. S/P gastric intubation/lavage 2/1. Now with metabolic encephalopathy, concern for seizure vs acute stroke. MRI brain revealed right MCA infarct. Pt now extubated, however noted to be lethargic/restless/not responding formally/unarousable with limited movement of left lower extremity. Pt medically optimized for stepdown to RMF 2/8. Palliative consulted for GOC discussion, currently full code. GI consulted for possible placement of PEG.     Pt visited, seen sleeping, left to rest. No visitors present at bedside. Observed tube feeds running at goal 65 ml/hr at the time of assessment, tube feeds now held (NPO) with plans for PEG placement. Per RN, no N/V/D/C noted, states only concern is pt tends to move around throughout the day and is a concern as he has high aspiration risk. RN requesting for bolus feeds. Please see below for full nutrition recs. Will continue to f/u per RD protocol.     Previous Nutrition Diagnosis:  Inadequate Energy Intake RT inability to meet needs w/ NPO status AEB consuming 0% EER.    Active [ X ]  Resolved [   ]    Goal: Diet to be advanced as medically feasible, pt to consume >75% EER via tolerated route.     Recommendations:  1) If pt to get PEG placement, recommend continue with previous EN regimen: Jevity 1.2 via PEG @ goal 65 ml/hr x 24hrs (providing 1560ml TV, 1872kcal, 86gms protein, 1258ml water (23kcal/kg and 1.0gms pro/kg based on ABW of 82.3kg).   >> Monitor s/s intolerance, maintain aspiration precautions at all times.   >> Additional water per MD discretion.  2) If pt to transition to bolus feeds, recommend: 7 cans Jevity 1.2 via PEG   >> Can have 2 cans for breakfast, 2 cans for lunch, 1 can for snack, 2 cans for dinner. Total provides: 1659 ml TV, 1991 kcal, 92 g protein, 1337ml free H2O; This meets: 24kcal/kg and 1.1 g protein/kg per ABW (82.3 kg)  3) Monitor lytes and replete prn.   4) Bowel regimen per MD discretion.     Education:   N/A; pt lethargic, no visitors present at bedside.     Risk Level: High [ X ] Moderate [   ] Low [   ] Admitting Diagnosis:   Patient is a 64y old  Male who presents with a chief complaint of [facility] for [facility] (10 Feb 2020 12:58)      PAST MEDICAL & SURGICAL HISTORY:  Hypertension  CVA (cerebral vascular accident)  No significant past surgical history      Current Nutrition Order:   Diet, NPO after Midnight:      NPO Start Date: 10-Feb-2020,   NPO Start Time: 23:59  Except Medications (02-10-20 @ 13:49)      PO Intake: Good (%) [   ]  Fair (50-75%) [   ] Poor (<25%) [   ]- N/A; Pt is NPO.     GI Issues:   No N/V/D/C noted per RN.   No TF residuals per flowsheet.   Unclear last BM date-RN confirms.     Pain: Unable to assess pain at this time.     Skin Integrity: Surgical incision noted; Edema 2+ left hand.    Labs:   02-10    146<H>  |  114<H>  |  20  ----------------------------<  109<H>  3.9   |  21<L>  |  1.44<H>    Ca    8.8      10 Feb 2020 06:23  Phos  2.6     02-10  Mg     2.1     02-10    TPro  5.8<L>  /  Alb  3.3  /  TBili  0.6  /  DBili  x   /  AST  41<H>  /  ALT  37  /  AlkPhos  53  02-10    CAPILLARY BLOOD GLUCOSE          Medications:  MEDICATIONS  (STANDING):  ampicillin/sulbactam  IVPB 3 Gram(s) IV Intermittent every 6 hours  aspirin  chewable 81 milliGRAM(s) Oral daily  atorvastatin 80 milliGRAM(s) Oral at bedtime  clopidogrel Tablet 75 milliGRAM(s) Oral daily  enoxaparin Injectable 40 milliGRAM(s) SubCutaneous every 24 hours  levETIRAcetam  Solution 500 milliGRAM(s) Enteral Tube every 12 hours  modafinil 100 milliGRAM(s) Oral <User Schedule>  nystatin Powder 1 Application(s) Topical two times a day  sodium chloride 0.9%. 500 milliLiter(s) (50 mL/Hr) IV Continuous <Continuous>    MEDICATIONS  (PRN):  acetaminophen    Suspension .. 650 milliGRAM(s) Oral every 8 hours PRN Temp greater or equal to 38C (100.4F)      Weight:  (1/31) 82.3 kg     Weight Change: No new wts to assess at this time.     Nutrition Focused Physical Exam: Completed [   ]  Not Pertinent [ X  ]    Estimated energy needs:   Ht: 5'8" (estimated), Wt: 82.3kg, IBW: 70kg, 117.5%IBW.   *Pt w/ updated height documented as 5'4" (as of 2/4/20). ?Accuracy. Will continue to use ABW (82.3kg) for calculations as pt w/ unclear ht.    Needs calculated based on [facility] standards of care. Needs adjusted for age  20-25kcal/kg= 1646-2057kcal  1.0-1.2gms pro/kg= 82-98gms protein    Subjective:   65 y/o M with PMHx of HTN and CVA (2 months ago) transferred from [facility] s/p stroke (left facial and eyelid drooling and speech slurring) presenting for possible thrombectomy, as patient outside window for tPA. 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 Patient was brought to the MICU but after procedural sedation began to wear off, appeared to be contracted with intermittent left leg jerking. Second SC called, patient intubated, and repeat CTH remained unchanged. S/P gastric intubation/lavage 2/1. Now with metabolic encephalopathy, concern for seizure vs acute stroke. MRI brain revealed right MCA infarct. Pt now extubated, however noted to be lethargic/restless/not responding formally/unarousable with limited movement of left lower extremity. Pt medically optimized for stepdown to RMF 2/8. Palliative consulted for GOC discussion, currently full code. GI consulted for possible placement of PEG.     Pt visited, seen sleeping, left to rest. No visitors present at bedside. Observed tube feeds running at goal 65 ml/hr at the time of assessment, tube feeds now held (NPO) with plans for PEG placement. Per RN, no N/V/D/C noted, states only concern is pt tends to move around throughout the day and is a concern as he has high aspiration risk. RN requesting for bolus feeds. Please see below for full nutrition recs-d/w team. Will continue to f/u per RD protocol.     Previous Nutrition Diagnosis:  Inadequate Energy Intake RT inability to meet needs w/ NPO status AEB consuming 0% EER.    Active [ X ]  Resolved [   ]    Goal: Diet to be advanced as medically feasible, pt to consume >75% EER via tolerated route.     Recommendations:  1) If pt to get PEG placement, recommend continue with previous EN regimen: Jevity 1.2 via PEG @ goal 65 ml/hr x 24hrs (providing 1560ml TV, 1872kcal, 86gms protein, 1258ml water (23kcal/kg and 1.0gms pro/kg based on ABW of 82.3kg).   >> Monitor s/s intolerance, maintain aspiration precautions at all times.   >> Additional water per MD discretion.  2) If pt to transition to bolus feeds, recommend: 7 cans Jevity 1.2 via PEG   >> Can have 2 cans for breakfast, 2 cans for lunch, 1 can for snack, 2 cans for dinner. Total provides: 1659 ml TV, 1991 kcal, 92 g protein, 1337ml free H2O; This meets: 24kcal/kg and 1.1 g protein/kg per ABW (82.3 kg)  3) Monitor lytes and replete prn.   4) Bowel regimen per MD discretion.   *Discussed with team.     Education:   N/A; pt lethargic, no visitors present at bedside.     Risk Level: High [ X ] Moderate [   ] Low [   ] Admitting Diagnosis:   Patient is a 64y old  Male who presents with a chief complaint of [facility] for [facility] (10 Feb 2020 12:58)      PAST MEDICAL & SURGICAL HISTORY:  Hypertension  CVA (cerebral vascular accident)  No significant past surgical history      Current Nutrition Order:   Diet, NPO after Midnight:      NPO Start Date: 10-Feb-2020,   NPO Start Time: 23:59  Except Medications (02-10-20 @ 13:49)      PO Intake: Good (%) [   ]  Fair (50-75%) [   ] Poor (<25%) [   ]- N/A; Pt is NPO.     GI Issues:   No N/V/D/C noted per RN.   No TF residuals per flowsheet.   Unclear last BM date-RN confirms.     Pain: Unable to assess pain at this time.     Skin Integrity: Surgical incision noted; Edema 2+ left hand.    Labs:   02-10    146<H>  |  114<H>  |  20  ----------------------------<  109<H>  3.9   |  21<L>  |  1.44<H>    Ca    8.8      10 Feb 2020 06:23  Phos  2.6     02-10  Mg     2.1     02-10    TPro  5.8<L>  /  Alb  3.3  /  TBili  0.6  /  DBili  x   /  AST  41<H>  /  ALT  37  /  AlkPhos  53  02-10    CAPILLARY BLOOD GLUCOSE          Medications:  MEDICATIONS  (STANDING):  ampicillin/sulbactam  IVPB 3 Gram(s) IV Intermittent every 6 hours  aspirin  chewable 81 milliGRAM(s) Oral daily  atorvastatin 80 milliGRAM(s) Oral at bedtime  clopidogrel Tablet 75 milliGRAM(s) Oral daily  enoxaparin Injectable 40 milliGRAM(s) SubCutaneous every 24 hours  levETIRAcetam  Solution 500 milliGRAM(s) Enteral Tube every 12 hours  modafinil 100 milliGRAM(s) Oral <User Schedule>  nystatin Powder 1 Application(s) Topical two times a day  sodium chloride 0.9%. 500 milliLiter(s) (50 mL/Hr) IV Continuous <Continuous>    MEDICATIONS  (PRN):  acetaminophen    Suspension .. 650 milliGRAM(s) Oral every 8 hours PRN Temp greater or equal to 38C (100.4F)      Weight:  (1/31) 82.3 kg     Weight Change: No new wts to assess at this time.     Nutrition Focused Physical Exam: Completed [   ]  Not Pertinent [ X  ]    Estimated energy needs:   Ht: 5'8" (estimated), Wt: 82.3kg, IBW: 70kg, 117.5%IBW.   *Pt w/ updated height documented as 5'4" (as of 2/4/20). ?Accuracy. Will continue to use ABW (82.3kg) for calculations as pt w/ unclear ht.    Needs calculated based on [facility] standards of care. Needs adjusted for age  20-25kcal/kg= 1646-2057kcal  1.0-1.2gms pro/kg= 82-98gms protein    Subjective:   65 y/o M with PMHx of HTN and CVA (2 months ago) transferred from [facility] s/p stroke (left facial and eyelid drooling and speech slurring) presenting for possible thrombectomy, as patient outside window for tPA. 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 Patient was brought to the MICU but after procedural sedation began to wear off, appeared to be contracted with intermittent left leg jerking. Second SC called, patient intubated, and repeat CTH remained unchanged. S/P gastric intubation/lavage 2/1. Now with metabolic encephalopathy, concern for seizure vs acute stroke. MRI brain revealed right MCA infarct. Pt now extubated, however noted to be lethargic/restless/not responding formally/unarousable with limited movement of left lower extremity. Pt medically optimized for stepdown to RMF 2/8. Palliative consulted for GOC discussion, currently full code. GI consulted for possible placement of PEG.     Pt visited, seen sleeping, left to rest. No visitors present at bedside. Observed tube feeds running at goal 65 ml/hr at the time of assessment, tube feeds to be held at midnight w/ plans for PEG placement tomorrow 2/11. Per RN, no N/V/D/C noted, states only concern is pt tends to move around throughout the day and is a concern as he has high aspiration risk. RN requesting for bolus feeds. Please see below for full nutrition recs-d/w team. Will continue to f/u per RD protocol.     Previous Nutrition Diagnosis:  Inadequate Energy Intake RT inability to meet needs w/ NPO status AEB consuming 0% EER.    Active [ X ]  Resolved [   ]    Goal: Diet to be advanced as medically feasible, pt to consume >75% EER via tolerated route.     Recommendations:  1) If pt to get PEG placement, recommend continue with previous EN regimen: Jevity 1.2 via PEG @ goal 65 ml/hr x 24hrs (providing 1560ml TV, 1872kcal, 86gms protein, 1258ml water (23kcal/kg and 1.0gms pro/kg based on ABW of 82.3kg).   >> Monitor s/s intolerance, maintain aspiration precautions at all times.   >> Additional water per MD discretion.  2) If pt to transition to bolus feeds, recommend: 7 cans Jevity 1.2 via PEG   >> Can have 2 cans for breakfast, 2 cans for lunch, 1 can for snack, 2 cans for dinner. Total provides: 1659 ml TV, 1991 kcal, 92 g protein, 1337ml free H2O; This meets: 24kcal/kg and 1.1 g protein/kg per ABW (82.3 kg)  3) Monitor lytes and replete prn.   4) Bowel regimen per MD discretion.   *Discussed with team.     Education:   N/A; pt lethargic, no visitors present at bedside.     Risk Level: High [ X ] Moderate [   ] Low [   ]

## 2020-02-10 NOTE — PROGRESS NOTE ADULT - SUBJECTIVE AND OBJECTIVE BOX
OVERNIGHT EVENTS:    SUBJECTIVE / INTERVAL HPI: Patient seen and examined at bedside.     VITAL SIGNS:  Vital Signs Last 24 Hrs  T(C): 37.1 (10 Feb 2020 05:35), Max: 38 (2020 09:30)  T(F): 98.8 (10 Feb 2020 05:35), Max: 100.4 (2020 09:30)  HR: 84 (10 Feb 2020 05:35) (57 - 84)  BP: 143/78 (10 Feb 2020 05:35) (94/58 - 157/94)  BP(mean): --  RR: 16 (10 Feb 2020 05:35) (16 - 16)  SpO2: 96% (10 Feb 2020 05:35) (94% - 97%)    PHYSICAL EXAM:    General: WDWN  HEENT: NC/AT; PERRL, anicteric sclera; MMM  Neck: supple  Cardiovascular: +S1/S2; RRR  Respiratory: CTA B/L; no W/R/R  Gastrointestinal: soft, NT/ND; +BSx4  Extremities: WWP; no edema, clubbing or cyanosis  Vascular: 2+ radial, DP/PT pulses B/L  Neurological: AAOx3; no focal deficits    MEDICATIONS:  MEDICATIONS  (STANDING):  aspirin  chewable 81 milliGRAM(s) Oral daily  atorvastatin 80 milliGRAM(s) Oral at bedtime  clopidogrel Tablet 75 milliGRAM(s) Oral daily  enoxaparin Injectable 40 milliGRAM(s) SubCutaneous every 24 hours  levETIRAcetam  Solution 500 milliGRAM(s) Enteral Tube every 12 hours  modafinil 100 milliGRAM(s) Oral <User Schedule>  nystatin Powder 1 Application(s) Topical two times a day  piperacillin/tazobactam IVPB.. 3.375 Gram(s) IV Intermittent every 6 hours  sodium chloride 0.9%. 500 milliLiter(s) (50 mL/Hr) IV Continuous <Continuous>  vancomycin  IVPB 1250 milliGRAM(s) IV Intermittent every 12 hours    MEDICATIONS  (PRN):  acetaminophen    Suspension .. 650 milliGRAM(s) Oral every 8 hours PRN Temp greater or equal to 38C (100.4F)      ALLERGIES:  Allergies    Allergy Status Unknown    Intolerances        LABS:                        12.8   6.03  )-----------( 178      ( 2020 13:27 )             39.4     02-    149<H>  |  115<H>  |  26<H>  ----------------------------<  129<H>  4.5   |  24  |  1.56<H>    Ca    8.9      2020 13:27  Phos  3.1     02-  Mg     2.1     02-    TPro  5.5<L>  /  Alb  3.2<L>  /  TBili  0.5  /  DBili  x   /  AST  42<H>  /  ALT  37  /  AlkPhos  52  02-      Urinalysis Basic - ( 2020 12:39 )    Color: Yellow / Appearance: Clear / S.025 / pH: x  Gluc: x / Ketone: NEGATIVE  / Bili: Negative / Urobili: 0.2 E.U./dL   Blood: x / Protein: 30 mg/dL / Nitrite: NEGATIVE   Leuk Esterase: NEGATIVE / RBC: < 5 /HPF / WBC 5-10 /HPF   Sq Epi: x / Non Sq Epi: 0-5 /HPF / Bacteria: Present /HPF      CAPILLARY BLOOD GLUCOSE          RADIOLOGY & ADDITIONAL TESTS: Reviewed.    ASSESSMENT:    PLAN: OVERNIGHT EVENTS: No acute events overnight. Afebrile. VSS. HD stable.    SUBJECTIVE / INTERVAL HPI: Patient seen and examined at bedside thsi AM, unable to obtain ROS as patient non-arousable.     VITAL SIGNS:  Vital Signs Last 24 Hrs  T(C): 37.1 (10 Feb 2020 05:35), Max: 38 (2020 09:30)  T(F): 98.8 (10 Feb 2020 05:35), Max: 100.4 (2020 09:30)  HR: 84 (10 Feb 2020 05:35) (57 - 84)  BP: 143/78 (10 Feb 2020 05:35) (94/58 - 157/94)  BP(mean): --  RR: 16 (10 Feb 2020 05:35) (16 - 16)  SpO2: 96% (10 Feb 2020 05:35) (94% - 97%)    PHYSICAL EXAM:  General: elderly  male resting in bed; agitated; NAD  HEENT: PERRL, anicteric sclera; MMM, NGT in place  Neck: supple; no JVD; no cerviacl or submandibular lymphadenopathy   Cardiovascular: +S1/S2; RRR  Respiratory: CTA B/L; no W/R/R  Gastrointestinal: overly nourished; soft; +BSx4  : goldberg in place draining concentrated urine  Extremities: WWP; !+ lower extremity edema L>R edema   Vascular: 2+ radial, DP/PT pulses B/L  Neurological: AAOx0; responds to noxious stimuli, spontaneously moves right UE, Right LE    MEDICATIONS:  MEDICATIONS  (STANDING):  aspirin  chewable 81 milliGRAM(s) Oral daily  atorvastatin 80 milliGRAM(s) Oral at bedtime  clopidogrel Tablet 75 milliGRAM(s) Oral daily  enoxaparin Injectable 40 milliGRAM(s) SubCutaneous every 24 hours  levETIRAcetam  Solution 500 milliGRAM(s) Enteral Tube every 12 hours  modafinil 100 milliGRAM(s) Oral <User Schedule>  nystatin Powder 1 Application(s) Topical two times a day  piperacillin/tazobactam IVPB.. 3.375 Gram(s) IV Intermittent every 6 hours  sodium chloride 0.9%. 500 milliLiter(s) (50 mL/Hr) IV Continuous <Continuous>  vancomycin  IVPB 1250 milliGRAM(s) IV Intermittent every 12 hours    MEDICATIONS  (PRN):  acetaminophen    Suspension .. 650 milliGRAM(s) Oral every 8 hours PRN Temp greater or equal to 38C (100.4F)      ALLERGIES:  Allergies    Allergy Status Unknown    Intolerances        LABS:                        12.8   6.03  )-----------( 178      ( 2020 13:27 )             39.4     02-    149<H>  |  115<H>  |  26<H>  ----------------------------<  129<H>  4.5   |  24  |  1.56<H>    Ca    8.9      2020 13:27  Phos  3.1     02-  Mg     2.1     -    TPro  5.5<L>  /  Alb  3.2<L>  /  TBili  0.5  /  DBili  x   /  AST  42<H>  /  ALT  37  /  AlkPhos  52  02-      Urinalysis Basic - ( 2020 12:39 )    Color: Yellow / Appearance: Clear / S.025 / pH: x  Gluc: x / Ketone: NEGATIVE  / Bili: Negative / Urobili: 0.2 E.U./dL   Blood: x / Protein: 30 mg/dL / Nitrite: NEGATIVE   Leuk Esterase: NEGATIVE / RBC: < 5 /HPF / WBC 5-10 /HPF   Sq Epi: x / Non Sq Epi: 0-5 /HPF / Bacteria: Present /HPF      CAPILLARY BLOOD GLUCOSE          RADIOLOGY & ADDITIONAL TESTS: Reviewed.    ASSESSMENT:    PLAN:

## 2020-02-10 NOTE — PROGRESS NOTE ADULT - PROBLEM SELECTOR PLAN 9
F: 500cc LR bolus  E: Replete K < 4.0, and Mg <2.0   N: Jevity  SCD: Lovenox F: N/A  E: Replete K < 4.0, and Mg <2.0   N: Jevity 1.2 @ goal 65cc/hr, NPO at midnight for PEG   SCD: Lovenox

## 2020-02-10 NOTE — CONSULT NOTE ADULT - ASSESSMENT
64 M  PMH HTN, recent CVA, transferred from [place] with CVA of MCA, outside tPA window, now with metabolic encephalopathy 2/2 stroke and poor functional recovery    1. CVA, AMS  - Management as per neuro primary  - Palliative discussion for GOC  - Currently on ASA, plavix, high intensity statin    2. HTN  - S/p nicardipine gtt, currently resolved. No meds at present, continue to monitor bp    3. GOC  - F/u palliative care recs  - Consider TOV

## 2020-02-10 NOTE — PROGRESS NOTE ADULT - PROBLEM SELECTOR PLAN 3
Blood pressure medications discontinued over the weekend as patient became hypotensive and he was started on unasyn for suspected aspiration pneumonia.    Management as primary team. Medicine consult following. Appreciate recs. Blood pressure medications discontinued over the weekend as patient became hypotensive and he was started on Unasyn for suspected aspiration pneumonia.    Management as primary team. Medicine consult following. As per primary team, patient's brother Tim Villanueva [phone] is the HCP and making decisions for his care, but no evidence of HCP found.   He is now pending a PEG tube and rehab placement. Palliative care was consulted for goals of care discussion and to provide support services to the family. Called the brother this morning, he would like to speak face to face and will come back later today. Will update once discussion taken place. Currently FULL CODE. Brother plans to take him back to [place].     Will discuss further once brother at bedside. Please notify palliative when brother at bedside. As per primary team, patient's brother Tim Villanueva [phone] is the HCP and making decisions for his care, but no evidence of HCP found.   He is now pending a PEG tube and rehab placement. Palliative care was consulted for goals of care discussion and to provide support services to the family. Called the brother this morning, he would like to speak face to face and will come back later today. Will update once discussion taken place. Currently FULL CODE. Brother plans to take him back to [place].     Will discuss further once brother at bedside. Please notify palliative when brother at bedside. Once discussion is done will complete GOC note.

## 2020-02-10 NOTE — CHART NOTE - NSCHARTNOTEFT_GEN_A_CORE
PALLIATIVE MEDICINE COORDINATION OF CARE NOTE FOR ADINA MADRID    Patient last seen on: 2/7/2020 in order to manage: GOC and was recommended: Have family meeting to discuss GOC    30 Minutes; Start: 9:30am End: 10:00am, of non-face-to-face prolonged service provided that relates to (face-to-face) care that has or will occur and ongoing patient management, including one or more of the following:   - Reviewed records from other physicians or other health care professional services, including one or more of the following: other medical records and diagnostic / radiology study results       - Other: Medication reviewed.    The patient HAS used PRN's in the last 24h. Patient received 1 dose of APAP 650mg in the last 24h. MME: 0mg    MEDICATIONS  (STANDING):  ampicillin/sulbactam  IVPB 3 Gram(s) IV Intermittent every 6 hours  aspirin  chewable 81 milliGRAM(s) Oral daily  atorvastatin 80 milliGRAM(s) Oral at bedtime  clopidogrel Tablet 75 milliGRAM(s) Oral daily  enoxaparin Injectable 40 milliGRAM(s) SubCutaneous every 24 hours  levETIRAcetam  Solution 500 milliGRAM(s) Enteral Tube every 12 hours  modafinil 100 milliGRAM(s) Oral <User Schedule>  nystatin Powder 1 Application(s) Topical two times a day  sodium chloride 0.9%. 500 milliLiter(s) (50 mL/Hr) IV Continuous <Continuous>  MEDICATIONS  (PRN):  acetaminophen    Suspension .. 650 milliGRAM(s) Oral every 8 hours PRN Temp greater or equal to 38C (100.4F)    - Other: Advanced directives     Full Code       No documented HCP form found on [facility]     No Living will / POA / Advance directives found on [place] / [facility].     No documented GOC notes on [facility]    - Other: Coordination/Plan of care     1st [facility] admission       Current admission LOS: 10 days     LACE score: 6        Discussed with primary team and plan to have conversations with brother once he is present at bedside.

## 2020-02-10 NOTE — PROGRESS NOTE ADULT - ASSESSMENT
65 y/o M with PMHx of HTN and CVA (2 months ago) transferred from [facility] s/p stroke presenting for possible thrombectomy, as patient outside window for tPA - c/b reintubation following thrombectomy, now with metabolic encephalopathy, concern for seizure vs acute stroke. MRI with R MCA infarct. Palliative team consulted for goals of care discussion and support services. 65 y/o M with PMHx of HTN and CVA (2 months ago) transferred from [facility] s/p stroke presenting for possible thrombectomy, as patient outside window for tPA - c/b reintubation following thrombectomy, now with metabolic encephalopathy, concern for seizure vs acute stroke. MRI with R MCA infarct. Palliative team consulted for goals of care discussion and support services.

## 2020-02-10 NOTE — PROGRESS NOTE ADULT - PROBLEM SELECTOR PLAN 2
As per primary team, patient's brother Tim Villanueva [phone] is the HCP and making decisions for his care, but no evidence of HCP found.   He is now pending a PEG tube and rehab placement. Palliative care was consulted for goals of care discussion and to provide support services to the family. Called the brother this morning, he would like to speak face to face and will come back later today. Will update once discussion taken place. Currently FULL CODE. Brother plans to take him back to [place]. Will discuss As per primary team, patient's brother Tim Villanueva [phone] is the HCP and making decisions for his care, but no evidence of HCP found.   He is now pending a PEG tube and rehab placement. Palliative care was consulted for goals of care discussion and to provide support services to the family. Called the brother this morning, he would like to speak face to face and will come back later today. Will update once discussion taken place. Currently FULL CODE. Brother plans to take him back to [place].     Will discuss further once brother at bedside. Please notify palliative when brother at bedside. Blood pressure medications discontinued over the weekend as patient became hypotensive and he was started on Unasyn for suspected aspiration pneumonia.    Management as primary team. Medicine consult following.

## 2020-02-10 NOTE — PROGRESS NOTE ADULT - SUBJECTIVE AND OBJECTIVE BOX
Physical Medicine and Rehabilitation Progress Note:    Patient is a 64y old  Male who presents with a chief complaint of transfer from [facility] for stroke (10 Feb 2020 12:58)      HPI:  History obtained from [facility] medical record, as patient altered and subsequently intubated.     65 y/o male with PMH hypertension CVA (2 months ago at [facility]), dementia and depression transferred from [facility] with concern for stroke (MCA). Patient presented to [facility] after presenting to pharmacy with shirt and shoes on backwards and episode of falling on floor and speech slurring in pharmacy, found to have left sided facial drooping of eyelids, slurred speech, and expressive aphasia on presentation to [facility]. Last known normal unknown. Baseline mental status AAOx3 able to follow commands, completes ADLs independently.     Patient outside of window for tPA, transferred to [facility] for possible thrombectomy. At [facility], stroke code called on admission - imaging negative for acute infarct. Thrombectomy of R MCA attempted, however aborted as noted to have stump occlusion of mid right M1 with extensive JOSEPHINE collaterals - likely chronic occulusion. Patient extubated and upon presentation to MICU, patient noted to be altered with tongue rolling back, with contraction of left upper and lower extremity - patient intubated for airway protection. Second stroke code called, with repeat imaging without evidence of acute infarct. Patients admitted to MICU, intubated pending vEEG.      [facility]: Vitals: T: Afebrile | HR: 91-93 | BP: 147-159/ (MAP: ) | RR: 20-22 |   Labs: BMP: WNL | UA: WNL | BAL <10 | Lipid panel: ; Cholesterol 176 |   Interventions: CTH: No acute intracranial abnormality; chronic microvascular ischemic and volume changes - old posterior right frontal cortical infarct noted.   CT Perfusion Brain w/ contrast: Right MCA abnormal flow pattern consistent with ischemic change; no discrete evidence of infarct.     [facility]: Vitals: T: Afebrile | HR: 90 | BP: 208/123 | RR: 14  Labs: WBC 6.88; Hgb 13; Plt 144; Cr 1.21; Glucose 104  CT Brain Stroke (on arrival): Gyriform sites of cortical hyperdensity, presumed contrast enhancement, are favored to be subacute sites of infarction, superimposed on more chronic sites of right MCA infarction.  CT Brain Stroke (2nd stroke code, following aborted thrombectomy): Compared to prior study from earlier in the day, no significant change in scattered enhancement of the right frontal and posterior temporal lobes, likely secondary to subacute infarction  CT Perfusion: Abnormal perfusion with RAPID calculated mismatch volume of 44 ml and mismatch ratio is infinite within the right MCA territory.  CT Angio Head: Probable high-grade stenosis of the right M1 segment rather than occlusion. Multifocal areas of narrowing involving the right A1 and left M1 segment which may be secondary to intracranial atherosclerosis.  CT Angio Neck: Normal CTA neck.     Interventions: Intubated and sedated s/p thrombectomy, vEEG placed (31 Jan 2020 19:25)                            13.0   7.16  )-----------( 183      ( 10 Feb 2020 06:23 )             40.2       02-10    146<H>  |  114<H>  |  20  ----------------------------<  109<H>  3.9   |  21<L>  |  1.44<H>    Ca    8.8      10 Feb 2020 06:23  Phos  2.6     02-10  Mg     2.1     02-10    TPro  5.8<L>  /  Alb  3.3  /  TBili  0.6  /  DBili  x   /  AST  41<H>  /  ALT  37  /  AlkPhos  53  02-10    Vital Signs Last 24 Hrs  T(C): 37 (10 Feb 2020 13:02), Max: 37.1 (10 Feb 2020 05:35)  T(F): 98.6 (10 Feb 2020 13:02), Max: 98.8 (10 Feb 2020 05:35)  HR: 77 (10 Feb 2020 13:02) (73 - 95)  BP: 129/80 (10 Feb 2020 13:02) (119/73 - 157/94)  BP(mean): --  RR: 16 (10 Feb 2020 13:02) (16 - 16)  SpO2: 97% (10 Feb 2020 13:02) (96% - 100%)    MEDICATIONS  (STANDING):  ampicillin/sulbactam  IVPB 3 Gram(s) IV Intermittent every 6 hours  aspirin  chewable 81 milliGRAM(s) Oral daily  atorvastatin 80 milliGRAM(s) Oral at bedtime  clopidogrel Tablet 75 milliGRAM(s) Oral daily  enoxaparin Injectable 40 milliGRAM(s) SubCutaneous every 24 hours  levETIRAcetam  Solution 500 milliGRAM(s) Enteral Tube every 12 hours  modafinil 100 milliGRAM(s) Oral <User Schedule>  nystatin Powder 1 Application(s) Topical two times a day  sodium chloride 0.9%. 500 milliLiter(s) (50 mL/Hr) IV Continuous <Continuous>    MEDICATIONS  (PRN):  acetaminophen    Suspension .. 650 milliGRAM(s) Oral every 8 hours PRN Temp greater or equal to 38C (100.4F)    Currently Undergoing Physical Therapy at bedside.    Functional Status Assessment:    Therapeutic Interventions      Bed Mobility  Bed Mobility Training Rehab Potential: fair, will monitor progress closely  Bed Mobility Training Symptoms Noted During/After Treatment: fatigue  Bed Mobility Training Rolling/Turning: dependent (less than 25% patient effort);  1 person assist  Bed Mobility Training Scooting: dependent (less than 25% patient effort);  2 person assist  Bed Mobility Training Sit-to-Supine: dependent (less than 25% patient effort);  2 person assist  Bed Mobility Training Supine-to-Sit: dependent (less than 25% patient effort);  2 person assist  Bed Mobility Training Limitations: decreased strength;  impaired balance;  abnormal muscle tone;  decreased flexibility;  impaired motor control;  impaired postural control;  cognitive, decreased safety awareness;  decreased ability to use arms for pushing/pulling;  decreased ability to use legs for bridging/pushing;  impaired ability to control trunk for mobility    Sit-Stand Transfer Training  Sit-to-Stand Transfer Training Treatment not Performed: unable to treat patient due to medical status    Therapeutic Exercise  Therapeutic Exercise Detail: dependent dangling x 10 min:lethargic, eyes closed ~ 90% time, able to respond to name and track R/LPROM B long arc quads, PROM BUE functional reaching, PROM L elbow, increased LUE tone, when PROM LUE into L elbow extension increased LLE extensor tone and severe retropulsion, PROM L scap stabilization onto L forearm x 10 sec x 3 reps             PM&R Impression: as above    Current Disposition Plan: dual plan acute and subacute rehab applications secondary to mental status

## 2020-02-10 NOTE — GOALS OF CARE CONVERSATION - ADVANCED CARE PLANNING - CONVERSATION DETAILS
In addition to the EM visit, an advance care planning meeting was performed  Start time: 1:35 pm  End time: 2:10 pm  Total time: 35 minutes  A face to face meeting to discuss advance care planning was held today regarding: ADINA MADRID  Primary decision maker: Albino (patient's son)  Alternate/surrogate: Tim Madrid  Discussed patient's diagnosis of large stroke with his brother. The brother states he has been making medical decisions with the patient's son Albino who is in [facility]. He states they video chat frequently and he keeps him informed regarding medical updates. He is aware that the patient needs a PEG and he states he discussed it with Albino. They would like the patient to be moved to [place] as the brother lives there however he is aware that he needs complex medical care. He would prefer the patient to be placed in a rehab in [place] as it is cheaper for him to live and visit the patient. He states he will find the phone number for Albino and give it to us. Currently plan for PEG tomorrow and rehab placement. List of options of rehab places to be provided to patient. The brother states that the son will be unable to visit the patient. In addition to the EM visit, an advance care planning meeting was performed  Start time: 1:35 pm  End time: 2:10 pm  Total time: 35 minutes  A face to face meeting to discuss advance care planning was held today regarding: ADINA MADRID  Primary decision maker: Albino (patient's son)  Alternate/surrogate: Tim Madrid  Discussed advance directives including, but not limited to, healthcare proxy and code status.  Discussed patient's diagnosis of large stroke with his brother.   Documentation completed today: None    The brother states he has been making medical decisions with the patient's son Albino, who is in [facility]. He states they video chat frequently and he keeps him informed regarding medical updates. He is aware that the patient needs a PEG and he states he discussed it with Albino and both agree to move forward. They would like the patient to be moved to [place] as the brother lives there however he is aware that he needs complex medical care. He would prefer the patient to be placed in a rehab in [place] as it is cheaper for him to live and visit the patient there. He states he will find the phone number for Albino and give it to us. Currently plan for PEG tomorrow and rehab placement. List of options of rehab places to be provided to patient by SW/CM. The brother states that the son will be unable to visit the patient since he can't come to the [facility].

## 2020-02-10 NOTE — PROGRESS NOTE ADULT - SUBJECTIVE AND OBJECTIVE BOX
ADINA MADRID             MRN-[id number]    CC: Goals of care conversation    HPI:  History obtained from [place] medical record, as patient altered and subsequently intubated.     65 y/o male with PMH hypertension CVA (2 months ago at [facility]), dementia and depression transferred from [facility] with concern for stroke (MCA). Patient presented to [facility] after presenting to pharmacy with shirt and shoes on backwards and episode of falling on floor and speech slurring in pharmacy, found to have left sided facial drooping of eyelids, slurred speech, and expressive aphasia on presentation to [place]. Last known normal unknown. Baseline mental status AAOx3 able to follow commands, completes ADLs independently.     Patient outside of window for tPA, transferred to [facility] for possible thrombectomy. At [facility], stroke code called on admission - imaging negative for acute infarct. Thrombectomy of R MCA attempted, however aborted as noted to have stump occlusion of mid right M1 with extensive JOSEPHINE collaterals - likely chronic occulusion. Patient extubated and upon presentation to MICU, patient noted to be altered with tongue rolling back, with contraction of left upper and lower extremity - patient intubated for airway protection. Second stroke code called, with repeat imaging without evidence of acute infarct. Patients admitted to MICU, intubated pending vEEG.      [place]: Vitals: T: Afebrile | HR: 91-93 | BP: 147-159/ (MAP: ) | RR: 20-22 |   Labs: BMP: WNL | UA: WNL | BAL <10 | Lipid panel: ; Cholesterol 176 |   Interventions: CTH: No acute intracranial abnormality; chronic microvascular ischemic and volume changes - old posterior right frontal cortical infarct noted.   CT Perfusion Brain w/ contrast: Right MCA abnormal flow pattern consistent with ischemic change; no discrete evidence of infarct.     [facility]: Vitals: T: Afebrile | HR: 90 | BP: 208/123 | RR: 14  Labs: WBC 6.88; Hgb 13; Plt 144; Cr 1.21; Glucose 104  CT Brain Stroke (on arrival): Gyriform sites of cortical hyperdensity, presumed contrast enhancement, are favored to be subacute sites of infarction, superimposed on more chronic sites of right MCA infarction.  CT Brain Stroke (2nd stroke code, following aborted thrombectomy): Compared to prior study from earlier in the day, no significant change in scattered enhancement of the right frontal and posterior temporal lobes, likely secondary to subacute infarction  CT Perfusion: Abnormal perfusion with RAPID calculated mismatch volume of 44 ml and mismatch ratio is infinite within the right MCA territory.  CT Angio Head: Probable high-grade stenosis of the right M1 segment rather than occlusion. Multifocal areas of narrowing involving the right A1 and left M1 segment which may be secondary to intracranial atherosclerosis.  CT Angio Neck: Normal CTA neck.     Interventions: Intubated and sedated s/p thrombectomy, vEEG placed (31 Jan 2020 19:25)    SUBJECTIVE:    ROS: Unable to be obtained as not responding appropriately to questions   DYSPNEA: Y / N: N  NAUS/VOM: Y / N: N/A	  SECRETIONS: Y / N: N  AGITATION: Y / N: N  Pain (Y/N): N/A      -Provocation/Palliation:  -Quality/Quantity:  -Radiating:  -Severity:  -Timing/Frequency:  -Impact on ADLs:    OTHER REVIEW OF SYSTEMS:  UNABLE TO OBTAIN  due to mental status    PEx:  T(C): 37.1 (02-10-20 @ 05:35), Max: 37.1 (02-10-20 @ 05:35)  HR: 84 (02-10-20 @ 05:35) (57 - 84)  BP: 143/78 (02-10-20 @ 05:35) (94/58 - 157/94)  RR: 16 (02-10-20 @ 05:35) (16 - 16)  SpO2: 96% (02-10-20 @ 05:35) (95% - 97%)  Wt(kg): --    General: NAD  HEENT: NC/AT; PERRL, Non icteric, clear conjunctiva. Avoiding sight from light source. Dry lips NGT+feeds  Neck: supple, no JVD,   Cardiovascular: +S1/S2; RRR, no M/R/G  Respiratory: CTA b/l; no W/R/R  Gastrointestinal: soft, NT/ND; +BSx4   : Voiding freely  Extremities: WWP; 2+ peripheral pulses; no edema   Neurological: AAOx0; not able to follow commands, or respond to name, has spontaneous movements of the right upper extremity and right lower extremity, unable to move his left hand and leg.   Psych: Calm  Skin: Non jaundiced   Lymph: Normal    ALLERGIES: Allergy Status Unknown    Opiate Naive (Y/N): Yes  -iStop reviewed (Y/N): Yes. No Rx found on istop review. Ref#: [id number]    MEDICATIONS: REVIEWED  MEDICATIONS  (STANDING):  ampicillin/sulbactam  IVPB 3 Gram(s) IV Intermittent every 6 hours  aspirin  chewable 81 milliGRAM(s) Oral daily  atorvastatin 80 milliGRAM(s) Oral at bedtime  clopidogrel Tablet 75 milliGRAM(s) Oral daily  enoxaparin Injectable 40 milliGRAM(s) SubCutaneous every 24 hours  levETIRAcetam  Solution 500 milliGRAM(s) Enteral Tube every 12 hours  modafinil 100 milliGRAM(s) Oral <User Schedule>  nystatin Powder 1 Application(s) Topical two times a day  sodium chloride 0.9%. 500 milliLiter(s) (50 mL/Hr) IV Continuous <Continuous>    MEDICATIONS  (PRN):  acetaminophen    Suspension .. 650 milliGRAM(s) Oral every 8 hours PRN Temp greater or equal to 38C (100.4F)      LABS: REVIEWED  CBC:                        13.0   7.16  )-----------( 183      ( 10 Feb 2020 06:23 )             40.2     CMP:    02-10    146<H>  |  114<H>  |  20  ----------------------------<  109<H>  3.9   |  21<L>  |  1.44<H>    Ca    8.8      10 Feb 2020 06:23  Phos  2.6     02-10  Mg     2.1     02-10    TPro  5.8<L>  /  Alb  3.3  /  TBili  0.6  /  DBili  x   /  AST  41<H>  /  ALT  37  /  AlkPhos  53  02-10    Blood cultures: 02/09/2020: NGTD      IMAGING: CXR 02/09/2020: left sided opacity     ADVANCED DIRECTIVES:    Full Code    No documented HCP form found on [facility]  No Living will / POA / Advance directives found on [facility] / [facility].  No documented GOC notes on [facility]    Decision maker: The patient does not demonstrate capacity for complex medical decision making given medical issues.   Legal surrogate: No documented HCP in paper chart/[facility]. No information on marital status or children. Documented information in the chart for brother Tim Madrid [phone] and "cousins" Brien Fox [phone], and Rafa Kevin [phone]. Uncertain if patient's parents are alive. If they are they would be the next legal surrogates. If they are not then the brother and cousins would share on decision making.    PSYCHOSOCIAL-SPIRITUAL ASSESSMENT:       Reviewed       Care plan unchanged       Care plan adjusted as above	    GOALS OF CARE DISCUSSION       Palliative care info/counseling provided	      	      AGENCY CHOICE DISCUSSED:           Homecare        Hospice        [facility]        [facility]        Other:    REFERRALS	        Palliative Med        Unit SW/Case Mgmt              Speech/Swallow       Patient/Family Support       Massage Therapy       Music Therapy       Hospice       Nutrition       Ethics       PT/OT ADINA MADRID             MRN-[id number]    CC: Goals of care conversation    HPI:  History obtained from [place] medical record, as patient altered and subsequently intubated.     65 y/o male with PMH hypertension CVA (2 months ago at [facility]), dementia and depression transferred from [facility] with concern for stroke (MCA). Patient presented to [facility] after presenting to pharmacy with shirt and shoes on backwards and episode of falling on floor and speech slurring in pharmacy, found to have left sided facial drooping of eyelids, slurred speech, and expressive aphasia on presentation to [facility]. Last known normal unknown. Baseline mental status AAOx3 able to follow commands, completes ADLs independently.     Patient outside of window for tPA, transferred to [facility] for possible thrombectomy. At [facility], stroke code called on admission - imaging negative for acute infarct. Thrombectomy of R MCA attempted, however aborted as noted to have stump occlusion of mid right M1 with extensive JOSEPHINE collaterals - likely chronic occulusion. Patient extubated and upon presentation to MICU, patient noted to be altered with tongue rolling back, with contraction of left upper and lower extremity - patient intubated for airway protection. Second stroke code called, with repeat imaging without evidence of acute infarct. Patients admitted to MICU, intubated pending vEEG.      [facility]: Vitals: T: Afebrile | HR: 91-93 | BP: 147-159/ (MAP: ) | RR: 20-22 |   Labs: BMP: WNL | UA: WNL | BAL <10 | Lipid panel: ; Cholesterol 176 |   Interventions: CTH: No acute intracranial abnormality; chronic microvascular ischemic and volume changes - old posterior right frontal cortical infarct noted.   CT Perfusion Brain w/ contrast: Right MCA abnormal flow pattern consistent with ischemic change; no discrete evidence of infarct.     [facility]: Vitals: T: Afebrile | HR: 90 | BP: 208/123 | RR: 14  Labs: WBC 6.88; Hgb 13; Plt 144; Cr 1.21; Glucose 104  CT Brain Stroke (on arrival): Gyriform sites of cortical hyperdensity, presumed contrast enhancement, are favored to be subacute sites of infarction, superimposed on more chronic sites of right MCA infarction.  CT Brain Stroke (2nd stroke code, following aborted thrombectomy): Compared to prior study from earlier in the day, no significant change in scattered enhancement of the right frontal and posterior temporal lobes, likely secondary to subacute infarction  CT Perfusion: Abnormal perfusion with RAPID calculated mismatch volume of 44 ml and mismatch ratio is infinite within the right MCA territory.  CT Angio Head: Probable high-grade stenosis of the right M1 segment rather than occlusion. Multifocal areas of narrowing involving the right A1 and left M1 segment which may be secondary to intracranial atherosclerosis.  CT Angio Neck: Normal CTA neck.     Interventions: Intubated and sedated s/p thrombectomy, vEEG placed (31 Jan 2020 19:25)    SUBJECTIVE:    ROS: Unable to be obtained as not responding appropriately to questions   DYSPNEA: Y / N: N  NAUS/VOM: Y / N: N/A	  SECRETIONS: Y / N: N  AGITATION: Y / N: N  Pain (Y/N): N/A      -Provocation/Palliation:  -Quality/Quantity:  -Radiating:  -Severity:  -Timing/Frequency:  -Impact on ADLs:    OTHER REVIEW OF SYSTEMS:  UNABLE TO OBTAIN  due to mental status    PEx:  T(C): 37.1 (02-10-20 @ 05:35), Max: 37.1 (02-10-20 @ 05:35)  HR: 84 (02-10-20 @ 05:35) (57 - 84)  BP: 143/78 (02-10-20 @ 05:35) (94/58 - 157/94)  RR: 16 (02-10-20 @ 05:35) (16 - 16)  SpO2: 96% (02-10-20 @ 05:35) (95% - 97%)  Wt(kg): --    General: NAD  HEENT: NC/AT; PERRL, Non icteric, clear conjunctiva. Avoiding sight from light source. Dry lips NGT+feeds  Neck: supple, no JVD,   Cardiovascular: +S1/S2; RRR, no M/R/G  Respiratory: CTA b/l; no W/R/R  Gastrointestinal: soft, NT/ND; +BSx4   : Voiding freely  Extremities: WWP; 2+ peripheral pulses; no edema   Neurological: AAOx0; not able to follow commands, or respond to name, has spontaneous movements of the right upper extremity and right lower extremity, unable to move his left hand and leg.   Psych: Calm  Skin: Non jaundiced   Lymph: Normal    ALLERGIES: Allergy Status Unknown    Opiate Naive (Y/N): Yes  -iStop reviewed (Y/N): Yes. No Rx found on istop review. Ref#: [id number]    MEDICATIONS: REVIEWED  MEDICATIONS  (STANDING):  ampicillin/sulbactam  IVPB 3 Gram(s) IV Intermittent every 6 hours  aspirin  chewable 81 milliGRAM(s) Oral daily  atorvastatin 80 milliGRAM(s) Oral at bedtime  clopidogrel Tablet 75 milliGRAM(s) Oral daily  enoxaparin Injectable 40 milliGRAM(s) SubCutaneous every 24 hours  levETIRAcetam  Solution 500 milliGRAM(s) Enteral Tube every 12 hours  modafinil 100 milliGRAM(s) Oral <User Schedule>  nystatin Powder 1 Application(s) Topical two times a day  sodium chloride 0.9%. 500 milliLiter(s) (50 mL/Hr) IV Continuous <Continuous>    MEDICATIONS  (PRN):  acetaminophen    Suspension .. 650 milliGRAM(s) Oral every 8 hours PRN Temp greater or equal to 38C (100.4F)      LABS: REVIEWED  CBC:                        13.0   7.16  )-----------( 183      ( 10 Feb 2020 06:23 )             40.2     CMP:    02-10    146<H>  |  114<H>  |  20  ----------------------------<  109<H>  3.9   |  21<L>  |  1.44<H>    Ca    8.8      10 Feb 2020 06:23  Phos  2.6     02-10  Mg     2.1     02-10    TPro  5.8<L>  /  Alb  3.3  /  TBili  0.6  /  DBili  x   /  AST  41<H>  /  ALT  37  /  AlkPhos  53  02-10    Blood cultures: 02/09/2020: NGTD  IMAGING: CXR 02/09/2020: left sided haziness    ADVANCED DIRECTIVES:    Full Code    No documented HCP form found on [facility]  No Living will / POA / Advance directives found on [facility] / [facility].  No documented GOC notes on [facility]    Decision maker: The patient does not demonstrate capacity for complex medical decision making given medical issues.   Legal surrogate: No documented HCP in paper chart/[facility]. No information on marital status or children. Documented information in the chart for brother Tim Madrid [phone] and "cousins" Brien Fox [phone], and Rafa Kevin [phone]. Uncertain if patient's parents are alive. If they are they would be the next legal surrogates. If they are not then the brother and cousins would share on decision making.    PSYCHOSOCIAL-SPIRITUAL ASSESSMENT:       Reviewed       Care plan unchanged       Care plan adjusted as above	    GOALS OF CARE DISCUSSION       Palliative care info/counseling provided	      	      AGENCY CHOICE DISCUSSED:           Homecare        Hospice        [facility]        [facility]        Other:    REFERRALS	        Palliative Med        Unit SW/Case Mgmt              Speech/Swallow       Patient/Family Support       Massage Therapy       Music Therapy       Hospice       Nutrition       Ethics       PT/OT ADINA MADRID             MRN-[id number]    CC: Goals of care conversation    HPI:  History obtained from [place] medical record, as patient altered and subsequently intubated.     63 y/o male with PMH hypertension CVA (2 months ago at [facility]), dementia and depression transferred from [facility] with concern for stroke (MCA). Patient presented to [facility] after presenting to pharmacy with shirt and shoes on backwards and episode of falling on floor and speech slurring in pharmacy, found to have left sided facial drooping of eyelids, slurred speech, and expressive aphasia on presentation to [place]. Last known normal unknown. Baseline mental status AAOx3 able to follow commands, completes ADLs independently.     Patient outside of window for tPA, transferred to [facility] for possible thrombectomy. At [facility], stroke code called on admission - imaging negative for acute infarct. Thrombectomy of R MCA attempted, however aborted as noted to have stump occlusion of mid right M1 with extensive JOSEPHINE collaterals - likely chronic occulusion. Patient extubated and upon presentation to MICU, patient noted to be altered with tongue rolling back, with contraction of left upper and lower extremity - patient intubated for airway protection. Second stroke code called, with repeat imaging without evidence of acute infarct. Patients admitted to MICU, intubated pending vEEG.       SUBJECTIVE: Mr Madrid was seen and examined at bedside. Found more alert and awake today but no answering questions or following commands.     ROS: Unable to be obtained as not responding appropriately to questions   DYSPNEA: Y / N: N  NAUS/VOM: Y / N: N/A	  SECRETIONS: Y / N: N  AGITATION: Y / N: N  Pain (Y/N): N/A      -Provocation/Palliation:  -Quality/Quantity:  -Radiating:  -Severity:  -Timing/Frequency:  -Impact on ADLs:    OTHER REVIEW OF SYSTEMS UNABLE TO OBTAIN  due: lack of response    PEx:  T(C): 37.1 (02-10-20 @ 05:35), Max: 37.1 (02-10-20 @ 05:35)  HR: 84 (02-10-20 @ 05:35) (57 - 84)  BP: 143/78 (02-10-20 @ 05:35) (94/58 - 157/94)  RR: 16 (02-10-20 @ 05:35) (16 - 16)  SpO2: 96% (02-10-20 @ 05:35) (95% - 97%)  Wt(kg): 82.3Kg    General: Awake alert in NAD but not interacting with encounter.   HEENT: NC/AT; PERRL, Non icteric, clear conjunctiva. Rightward gaze. Dry lips NGT+feeds  Neck: supple, no JVD,   Cardiovascular: +S1/S2; RRR, no M/R/G  Respiratory: CTA b/l; no W/R/R  Gastrointestinal: soft, NT/ND; +BSx4   : Lacy+  Extremities: WWP; 2+ peripheral pulses; no edema   Neurological: AAOx0; not able to follow commands, or respond to name, has spontaneous movements of the right upper extremity and right lower extremity, unable to move his left hand and leg.   Psych: Calm  Skin: Non jaundiced   Lymph: Normal    ALLERGIES: Allergy Status Unknown    Opiate Naive (Y/N): Yes    MEDICATIONS: REVIEWED  MEDICATIONS  (STANDING):  ampicillin/sulbactam  IVPB 3 Gram(s) IV Intermittent every 6 hours  aspirin  chewable 81 milliGRAM(s) Oral daily  atorvastatin 80 milliGRAM(s) Oral at bedtime  clopidogrel Tablet 75 milliGRAM(s) Oral daily  enoxaparin Injectable 40 milliGRAM(s) SubCutaneous every 24 hours  levETIRAcetam  Solution 500 milliGRAM(s) Enteral Tube every 12 hours  modafinil 100 milliGRAM(s) Oral <User Schedule>  nystatin Powder 1 Application(s) Topical two times a day  sodium chloride 0.9%. 500 milliLiter(s) (50 mL/Hr) IV Continuous <Continuous>  MEDICATIONS  (PRN):  acetaminophen    Suspension .. 650 milliGRAM(s) Oral every 8 hours PRN Temp greater or equal to 38C (100.4F)    LABS: REVIEWED  CBC:                        13.0   7.16  )-----------( 183      ( 10 Feb 2020 06:23 )             40.2     CMP:    02-10    146<H>  |  114<H>  |  20  ----------------------------<  109<H>  3.9   |  21<L>  |  1.44<H>    Ca    8.8      10 Feb 2020 06:23  Phos  2.6     02-10  Mg     2.1     02-10    TPro  5.8<L>  /  Alb  3.3  /  TBili  0.6  /  DBili  x   /  AST  41<H>  /  ALT  37  /  AlkPhos  53  02-10    Blood cultures: 02/09/2020: NGTD    IMAGING:     EXAM:  MR BRAIN                        PROCEDURE DATE:  02/04/2020    INTERPRETATION:  IKemar MD, have reviewed the images and the report and agree with the findings, with the following modification:   Technique: NoncontrastMRI of the brain using dedicated stroke protocol including axial diffusion weighted imaging, axial flair and axial GRE were obtained.  Agree with th findings of a large right MCA territorial infarction involving the right frontal and temporal lobes and the right basal ganglia. There is mass effect on the right lateral ventricle without significant midline shift.  Evaluation for hemorrhage is severely limited due to extensive motion artifact. There is apparent susceptibility related signal loss within the right thalamus and corona radiata which may be artifactual due to motion. Continued follow-up is recommended.  Agree with the finding of a chronic left parietal infarction.  ******PRELIMINARY REPORT******   PROCEDURE: MRI BRAIN without contrast  INDICATION: MCA occlusion, stroke.  TECHNIQUE: Axial T2, FLAIR and diffusion weighted images of the brain were obtained.  COMPARISON: Comparison is made to prior CT of the head from 1/31/2020.  FINDINGS: Limited study due to motion artifact. The MRI examination of the brain demonstrates the ventricles, cisternal spaces, and cortical sulci to be appropriate for the patient's stated age. There is an area of consolidation involving the left parietal lobe. Abnormal diffusion-weighted high intensity signal is seen in the right basal ganglia, posterior frontal lobe and temporal lobe, indicative of MCA territory infarct. There are also periventricular hyperintense foci consistent with small vessel ischemic disease. Unable to asses flow-voids. The visualized paranasal sinuses are free of mucosal disease. The mastoid air cells demonstrate hyperintense signal in the right mastoid air cell.  IMPRESSION: Limited study due to motion artifact, however there is right MCA territory infarct involving the basal ganglia, frontal lobe and temporal lobe as described above. There is also chronic encephalomalacia seen in the left parietal lobe, likely from prior infarction. Small vessel ischemic disease.    Xray Chest 1 View- PORTABLE-Urgent (02.06.20 @ 23:11)   EXAM:  XR CHEST PORTABLE URGENT 1V                        PROCEDURE DATE:  02/06/2020    ******PRELIMINARY REPORT******    ******PRELIMINARY REPORT******        INTERPRETATION:    XR CHEST URGENT dated 2/6/2020 11:11 PM  HISTORY: NGT Placement  COMPARISON: Chest x-ray from 2/6/2020.  FINDINGS: Partially visualized lower lung fields are clear. Opening of the enteric tube is seen in the area of the stomach. There are no pleural effusions. The cardiomediastinal silhouette, bones and soft tissues are unremarkable.  Nonspecific bowel gas pattern is identified within the abdomen.  IMPRESSION: Appropriate positioning of enteric tube.    ADVANCED DIRECTIVES:    Full Code    No documented HCP form found on [facility]  No Living will / POA / Advance directives found on [facility] / [facility].  No documented GOC notes on [facility]    Decision maker: The patient does not demonstrate capacity for complex medical decision making given medical issues.   Legal surrogate: No documented HCP in paper chart/[facility]. No information on marital status or children. Documented information in the chart for brother Tim Madrid [phone] and "cousins" Brien Fox [phone], and Rafa Brown [phone]. Uncertain if patient's parents are alive. If they are they would be the next legal surrogates. If they are not then the brother and cousins would share on decision making.    PSYCHOSOCIAL-SPIRITUAL ASSESSMENT:       Reviewed    GOALS OF CARE DISCUSSION       Palliative care info/counseling provided       Documentation of GOC: Full code. Pending GOC conversation with brother regarding artificial feeding and hydration. 	      	      REFERRALS	        Unit SW/Case Mgmt       Speech/Swallow       Nutrition/Dietician       PT/OT ADINA MADRID             MRN-[id number]    CC: Goals of care conversation    HPI:  History obtained from [place] medical record, as patient altered and subsequently intubated.     63 y/o male with PMH hypertension CVA (2 months ago at [facility]), dementia and depression transferred from [facility] with concern for stroke (MCA). Patient presented to [facility] after presenting to pharmacy with shirt and shoes on backwards and episode of falling on floor and speech slurring in pharmacy, found to have left sided facial drooping of eyelids, slurred speech, and expressive aphasia on presentation to [facility]. Last known normal unknown. Baseline mental status AAOx3 able to follow commands, completes ADLs independently.     Patient outside of window for tPA, transferred to [facility] for possible thrombectomy. At [facility], stroke code called on admission - imaging negative for acute infarct. Thrombectomy of R MCA attempted, however aborted as noted to have stump occlusion of mid right M1 with extensive JOSEPHINE collaterals - likely chronic occulusion. Patient extubated and upon presentation to MICU, patient noted to be altered with tongue rolling back, with contraction of left upper and lower extremity - patient intubated for airway protection. Second stroke code called, with repeat imaging without evidence of acute infarct. Patients admitted to MICU, intubated pending vEEG.       SUBJECTIVE: Mr Madrid was seen and examined at bedside. Found more alert and awake today but no answering questions or following commands.     ROS: Unable to be obtained as not responding appropriately to questions   DYSPNEA: Y / N: No  NAUS/VOM: Y / N: Unable to assess	  SECRETIONS: Y / N: No  AGITATION: Y / N: No  Pain (Y/N): RANDI pain scale: 2  -Provocation/Palliation: Unable to assess  -Quality/Quantity: Unable to assess  -Radiating: Unable to assess  -Severity: No pain  -Timing/Frequency: Unable to assess  -Impact on ADLs: Unable to assess    OTHER REVIEW OF SYSTEMS UNABLE TO OBTAIN  due: lack of response    PEx:  T(C): 37.1 (02-10-20 @ 05:35), Max: 37.1 (02-10-20 @ 05:35)  HR: 84 (02-10-20 @ 05:35) (57 - 84)  BP: 143/78 (02-10-20 @ 05:35) (94/58 - 157/94)  RR: 16 (02-10-20 @ 05:35) (16 - 16)  SpO2: 96% (02-10-20 @ 05:35) (95% - 97%)  Wt(kg): 82.3Kg    General: Awake alert in NAD but not interacting with encounter.   HEENT: NC/AT; PERRL, Non icteric, clear conjunctiva. Rightward gaze. Dry lips NGT+feeds  Neck: supple, no JVD,   Cardiovascular: +S1/S2; RRR, no M/R/G  Respiratory: CTA b/l; no W/R/R  Gastrointestinal: soft, NT/ND; +BSx4   : Lacy+  Extremities: WWP; 2+ peripheral pulses; no edema   Neurological: AAOx0; not able to follow commands, or respond to name, has spontaneous movements of the right upper extremity and right lower extremity, unable to move his left hand and leg.   Psych: Calm  Skin: Non jaundiced   Lymph: Normal    ALLERGIES: Allergy Status Unknown    Opiate Naive (Y/N): Yes    MEDICATIONS: REVIEWED  MEDICATIONS  (STANDING):  ampicillin/sulbactam  IVPB 3 Gram(s) IV Intermittent every 6 hours  aspirin  chewable 81 milliGRAM(s) Oral daily  atorvastatin 80 milliGRAM(s) Oral at bedtime  clopidogrel Tablet 75 milliGRAM(s) Oral daily  enoxaparin Injectable 40 milliGRAM(s) SubCutaneous every 24 hours  levETIRAcetam  Solution 500 milliGRAM(s) Enteral Tube every 12 hours  modafinil 100 milliGRAM(s) Oral <User Schedule>  nystatin Powder 1 Application(s) Topical two times a day  sodium chloride 0.9%. 500 milliLiter(s) (50 mL/Hr) IV Continuous <Continuous>  MEDICATIONS  (PRN):  acetaminophen    Suspension .. 650 milliGRAM(s) Oral every 8 hours PRN Temp greater or equal to 38C (100.4F)    LABS: REVIEWED  CBC:                        13.0   7.16  )-----------( 183      ( 10 Feb 2020 06:23 )             40.2     CMP:    02-10    146<H>  |  114<H>  |  20  ----------------------------<  109<H>  3.9   |  21<L>  |  1.44<H>    Ca    8.8      10 Feb 2020 06:23  Phos  2.6     02-10  Mg     2.1     02-10    TPro  5.8<L>  /  Alb  3.3  /  TBili  0.6  /  DBili  x   /  AST  41<H>  /  ALT  37  /  AlkPhos  53  02-10    Blood cultures: 02/09/2020: NGTD    IMAGING:     EXAM:  MR BRAIN                        PROCEDURE DATE:  02/04/2020    INTERPRETATION:  IKemar MD, have reviewed the images and the report and agree with the findings, with the following modification:   Technique: NoncontrastMRI of the brain using dedicated stroke protocol including axial diffusion weighted imaging, axial flair and axial GRE were obtained.  Agree with th findings of a large right MCA territorial infarction involving the right frontal and temporal lobes and the right basal ganglia. There is mass effect on the right lateral ventricle without significant midline shift.  Evaluation for hemorrhage is severely limited due to extensive motion artifact. There is apparent susceptibility related signal loss within the right thalamus and corona radiata which may be artifactual due to motion. Continued follow-up is recommended.  Agree with the finding of a chronic left parietal infarction.  ******PRELIMINARY REPORT******   PROCEDURE: MRI BRAIN without contrast  INDICATION: MCA occlusion, stroke.  TECHNIQUE: Axial T2, FLAIR and diffusion weighted images of the brain were obtained.  COMPARISON: Comparison is made to prior CT of the head from 1/31/2020.  FINDINGS: Limited study due to motion artifact. The MRI examination of the brain demonstrates the ventricles, cisternal spaces, and cortical sulci to be appropriate for the patient's stated age. There is an area of consolidation involving the left parietal lobe. Abnormal diffusion-weighted high intensity signal is seen in the right basal ganglia, posterior frontal lobe and temporal lobe, indicative of MCA territory infarct. There are also periventricular hyperintense foci consistent with small vessel ischemic disease. Unable to asses flow-voids. The visualized paranasal sinuses are free of mucosal disease. The mastoid air cells demonstrate hyperintense signal in the right mastoid air cell.  IMPRESSION: Limited study due to motion artifact, however there is right MCA territory infarct involving the basal ganglia, frontal lobe and temporal lobe as described above. There is also chronic encephalomalacia seen in the left parietal lobe, likely from prior infarction. Small vessel ischemic disease.    Xray Chest 1 View- PORTABLE-Urgent (02.06.20 @ 23:11)   EXAM:  XR CHEST PORTABLE URGENT 1V                        PROCEDURE DATE:  02/06/2020    ******PRELIMINARY REPORT******    ******PRELIMINARY REPORT******        INTERPRETATION:    XR CHEST URGENT dated 2/6/2020 11:11 PM  HISTORY: NGT Placement  COMPARISON: Chest x-ray from 2/6/2020.  FINDINGS: Partially visualized lower lung fields are clear. Opening of the enteric tube is seen in the area of the stomach. There are no pleural effusions. The cardiomediastinal silhouette, bones and soft tissues are unremarkable.  Nonspecific bowel gas pattern is identified within the abdomen.  IMPRESSION: Appropriate positioning of enteric tube.    ADVANCED DIRECTIVES:    Full Code    No documented HCP form found on [facility]  No Living will / POA / Advance directives found on [facility] / [facility].  No documented GOC notes on [facility]    Decision maker: The patient does not demonstrate capacity for complex medical decision making given medical issues.   Legal surrogate: No documented HCP in paper chart/[facility]. No information on marital status or children. Documented information in the chart for brother Tim Madrid [phone] and "cousins" Brien Dominique [phone], and Rafa Brown [phone]. Uncertain if patient's parents are alive. If they are they would be the next legal surrogates. If they are not then the brother and cousins would share on decision making.    PSYCHOSOCIAL-SPIRITUAL ASSESSMENT:       Reviewed    GOALS OF CARE DISCUSSION       Palliative care info/counseling provided       Documentation of GOC: Full code. Pending GOC conversation with brother regarding artificial feeding and hydration. 	      	      REFERRALS	        Unit SW/Case Mgmt       Speech/Swallow       Nutrition/Dietician       PT/OT

## 2020-02-10 NOTE — PROGRESS NOTE ADULT - PROBLEM SELECTOR PLAN 7
#Overflow incontinence:   Patient with increase urinary output, concern for overflow incontinence.  - c/w Doxazosin 1mg 2mg qd at bedtime   - goldberg discontinued today   - failed TOV, so continue with bladder scans and straight cath q6 #Overflow incontinence:   Patient with increase urinary output, concern for overflow incontinence. Lacy in place.   - c/w Doxazosin 1mg 2mg qd at bedtime

## 2020-02-10 NOTE — PROGRESS NOTE ADULT - PROBLEM SELECTOR PLAN 6
now improving, likely prerenal due to decreased PO intake  - free water flushes  - if Cr uptrending, would check renal US.  recommend UA today as spiked fever.  - Trend BMP   - Avoid nephrotoxic medications

## 2020-02-10 NOTE — PROGRESS NOTE ADULT - PROBLEM SELECTOR PLAN 3
100.4 today  would check UA, blood cx.   CXR without significant change Patient febrile to 100.4 2/9. CXR clear, UA without evidence of infection. Blood cultures NGTD.   - vanc/zosyn d/c, patient likely aspirated, switch to Unasyn 3g q6hr (7 days total 2/10-2/16)  - f/u blood cultures  - aspiration precautions   - pending PEG tube

## 2020-02-10 NOTE — PROGRESS NOTE ADULT - ASSESSMENT
65 y/o M with PMHx of HTN and CVA (2 months ago) transferred from [facility] s/p stroke (left facial and eyelid drooling and speech slurring presenting for possible thrombectomy, as patient outside window for tPA - c/b reintubation following thrombectomy, now with metabolic encephalopathy 2/2 stroke

## 2020-02-10 NOTE — CONSULT NOTE ADULT - SUBJECTIVE AND OBJECTIVE BOX
HPI:  History obtained from medical record    64yr old M with PMHx significant for HTN, CVA, Dementia, Depression, transferred from [facility] for management of CVA in setting of AMS slurred speech, gait instability, L facial droop, and expressive aphasia. Not known when he was last normal so transferred for possible thrombectomy. On arrival went to OR for thrombectomy but found to have stump occlusion of mid right M1 with extensive JOSEPHINE collaterals, neurosurgery attempted mechanical thrombectomy using penumbra aspiration but this was unsuccessful as it appeared to be a chronic occlusion. Patient on transfer back to MICU was noted to be persistently altered and was re-intubated for airway protection. Neurology was suspicious for seizure as etiology of the AMS - so had vEEG. He is now extubated, but still remains non communicative, he is receiving enteral feeds via NGT. GI consulted for possible placement of PEG.  Discussed with brother Tim Villanueva at bedside about the R/B/A/I of PEG placement and he wants to proceed at this time. Informed consent obtained with nurse at bedside      EGD -   Colonoscopy -        PAST MEDICAL & SURGICAL HISTORY:  Hypertension  CVA (cerebral vascular accident)  No significant past surgical history      REVIEW OF SYSTEMS  Unable to obtain due to patients mental status      MEDICATIONS  (STANDING):  ampicillin/sulbactam  IVPB 3 Gram(s) IV Intermittent every 6 hours  aspirin  chewable 81 milliGRAM(s) Oral daily  atorvastatin 80 milliGRAM(s) Oral at bedtime  clopidogrel Tablet 75 milliGRAM(s) Oral daily  enoxaparin Injectable 40 milliGRAM(s) SubCutaneous every 24 hours  levETIRAcetam  Solution 500 milliGRAM(s) Enteral Tube every 12 hours  modafinil 100 milliGRAM(s) Oral <User Schedule>  nystatin Powder 1 Application(s) Topical two times a day  sodium chloride 0.9%. 500 milliLiter(s) (50 mL/Hr) IV Continuous <Continuous>    MEDICATIONS  (PRN):  acetaminophen    Suspension .. 650 milliGRAM(s) Oral every 8 hours PRN Temp greater or equal to 38C (100.4F)      Allergies  Allergy Status Unknown    Intolerances        SOCIAL HISTORY:  Unable to obtian due to patients mental status    FAMILY HISTORY:  No pertinent family history in first degree relatives      Vital Signs Last 24 Hrs  T(C): 37 (10 Feb 2020 13:02), Max: 37.1 (10 Feb 2020 05:35)  T(F): 98.6 (10 Feb 2020 13:02), Max: 98.8 (10 Feb 2020 05:35)  HR: 77 (10 Feb 2020 13:02) (73 - 95)  BP: 129/80 (10 Feb 2020 13:02) (119/73 - 157/94)  BP(mean): --  RR: 16 (10 Feb 2020 13:) (16 - 16)  SpO2: 97% (10 Feb 2020 13:) (96% - 100%)      PHYSICAL EXAM:  GEN: elderly M lying in bed in no distress, anicteric, afebrile, not pale  Eyes: MEL,   ENMT: NC, NT  Respiratory: CTA  Cardiovascular: s1 s2 no M RRR  Gastrointestinal: full, soft, BS+, no scars, NT, NR, NG no masses or organs palpated  Rectal: deferred  Extremities: no limb edema  Neurological: awake, non communicative and not following commands  Skin: intact        LABS:                    13.0   7.16  )-----------( 183      ( 10 Feb 2020 06:23 )             40.2     146<H>  |  114<H>  |  20  ----------------------------<  109<H>  3.9   |  21<L>  |  1.44<H>    Ca    8.8      10 Feb 2020 06:23  Phos  2.6     02-10  Mg     2.1     02-10    TPro  5.8<L>  /  Alb  3.3  /  TBili  0.6  /  DBili  x   /  AST  41<H>  /  ALT  37  /  AlkPhos  53  02-10    Urinalysis Basic - ( 2020 12:39 )  Color: Yellow / Appearance: Clear / S.025 / pH: x  Gluc: x / Ketone: NEGATIVE  / Bili: Negative / Urobili: 0.2 E.U./dL   Blood: x / Protein: 30 mg/dL / Nitrite: NEGATIVE   Leuk Esterase: NEGATIVE / RBC: < 5 /HPF / WBC 5-10 /HPF   Sq Epi: x / Non Sq Epi: 0-5 /HPF / Bacteria: Present /HPF          RADIOLOGY & ADDITIONAL STUDIES:

## 2020-02-10 NOTE — PROGRESS NOTE ADULT - PROBLEM SELECTOR PLAN 1
likely 2/2 R MCA infarct affecting the basal ganglia, frontal, and temporal lobes  -management per neuro  - c/w Keppra 500mg BID  - minimize ativan and sedation  - epilepsy following, appreciate recs  - stroke team following appreciate recs  - PT recommending of acute rehab facility likely 2/2 R MCA infarct affecting the basal ganglia, frontal, and temporal lobes  -management per neuro  - c/w Keppra 500mg BID  - minimize ativan and sedation  - PT recommending of acute rehab facility vs [facility]    #Depression   Per family, patient with history of depression. Previously on sertraline, now on modafenil 100mg BID.    - home trazadone 100mg held (pt non-arousable)  - holding home meds in setting of lethargy

## 2020-02-10 NOTE — PROGRESS NOTE ADULT - PROBLEM SELECTOR PLAN 2
History of CVA, Per medical records patient with CVA, 2 months ago. 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    - c/w ASA 81mg PO daily and Plavix 75mg PO daily  - c/w Atorvastatin 80mg PO daily  - holding off on LOC for now given marginal respiratory status History of CVA, Per medical records patient with CVA, 2 months ago. 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    - c/w ASA 81mg PO daily and Plavix 75mg PO daily -> will hold in setting of possible PEG in AM   - c/w Atorvastatin 80mg PO daily  -LOC to be performed outpatient

## 2020-02-10 NOTE — PROGRESS NOTE ADULT - ASSESSMENT
per Neurology    65 y/o M with PMHx of HTN and CVA (2 months ago) transferred from [facility] s/p stroke (left facial and eyelid drooling and speech slurring presenting for possible thrombectomy, as patient outside window for tPA - c/b reintubation following thrombectomy, now with metabolic encephalopathy 2/2 stroke        Problem/Plan - 1:  ·  Problem: Metabolic encephalopathy.  Plan: likely 2/2 R MCA infarct affecting the basal ganglia, frontal, and temporal lobes  -management per neuro  - c/w Keppra 500mg BID  - minimize ativan and sedation  - PT recommending of acute rehab facility vs [facility]    #Depression   Per family, patient with history of depression. Previously on sertraline, now on modafenil 100mg BID.    - home trazadone 100mg held (pt non-arousable)  - holding home meds in setting of lethargy.    Problem/Plan - 2:  ·  Problem: Cerebrovascular accident (CVA), unspecified mechanism.  Plan: History of CVA, Per medical records patient with CVA, 2 months ago. 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    - c/w ASA 81mg PO daily and Plavix 75mg PO daily -> will hold in setting of possible PEG in AM   - c/w Atorvastatin 80mg PO daily  -LOC to be performed outpatient.    Problem/Plan - 3:  ·  Problem: Aspiration pneumonia. Plan: Patient febrile to 100.4 2/9. CXR clear, UA without evidence of infection. Blood cultures NGTD.   - vanc/zosyn d/c, patient likely aspirated, switch to Unasyn 3g q6hr (7 days total 2/10-2/16)  - f/u blood cultures  - aspiration precautions   - pending PEG tube.    Problem/Plan - 4:  ·  Problem: Hypertension, unspecified type.  Plan: now controlled on enalapril, amlodipine, coreg  - may give labatelol 10mg IV push or Lasix 20mg IV push if episodes of HTN   - SBP goal <180.     Problem/Plan - 5:  ·  Problem: Hypernatremia.  Plan: Patient Na beginning to trend down  - C/w with tube feeds with Jevity  - Free water flushes 250 cc q 4 hours.     Problem/Plan - 6:  Problem: PAULY (acute kidney injury). Plan: now improving, likely prerenal due to decreased PO intake  - free water flushes  - if Cr uptrending, would check renal US.  recommend UA today as spiked fever.  - Trend BMP   - Avoid nephrotoxic medications.    Problem/Plan - 7:  ·  Problem: Overflow incontinence.  Plan: #Overflow incontinence:   Patient with increase urinary output, concern for overflow incontinence. Lacy in place.   - c/w Doxazosin 1mg 2mg qd at bedtime.    Problem/Plan - 8:  ·  Problem: Goals of care, counseling/discussion. Plan: Patient AAOx0, unable to make decisions. Next of kin is son in [facility]. Brother would like to take brother to [place] to pursue rehab.   - pending contact with brother  - consent for PEG in AM.    Problem/Plan - 9:  ·  Problem: Nutrition, metabolism, and development symptoms.  Plan: F: N/A  E: Replete K < 4.0, and Mg <2.0   N: Jevity 1.2 @ goal 65cc/hr, NPO at midnight for PEG   SCD: Lovenox.

## 2020-02-10 NOTE — CONSULT NOTE ADULT - ASSESSMENT
64yr old M with PMHx significant for HTN, CVA, Dementia, Depression, transferred from [facility] for management of CVA in setting of AMS slurred speech, gait instability, L facial droop, and expressive aphasia.     # Dysphagia -  - 2/2 CVA  - plan for PEG placement  - hold plavix/lovenox  - check INR  - NPO midnite  - will plan for PEG placement tomorrow      Discussed with attending Dr Cisse.

## 2020-02-10 NOTE — CONSULT NOTE ADULT - SUBJECTIVE AND OBJECTIVE BOX
Medicine Consult Note    MERCY ADINA  64y  Male    HPI: 64 year old M with PMH dementia, HTN, CVA (2 mo ago) transferred from [place] for r/o CVA. Found to have a negative CT head on admission, however required intubation in setting of AMS for airway protection. Has since been extubated and concern for seizures as cause of altered mental status, was given keppra load. Has had HTN requiring gtt since discontinued, now transferred with continued altered mental status from [facility] to [facility] medical floor under neurology with medicine consult. Patient does not endorse any review of systems and is unable to participate in examination. Labs notable for downtrending sodium, stable creatinine, blood cultures negative. Per neuro, continue with low dose statin and ASA 81. Has been on Unasyn for aspiration pneumonia given AMS, currently with NG tube in.    Family history: unable to obtain  PSH: unable to obtain    Review of Systems:  Unable to participate in review of systems due to neurologic status      T(C): 37.1 (02-10-20 @ 05:35), Max: 37.1 (02-10-20 @ 05:35)  HR: 84 (02-10-20 @ 05:35) (57 - 84)  BP: 143/78 (02-10-20 @ 05:35) (94/58 - 157/94)  RR: 16 (02-10-20 @ 05:35) (16 - 16)  SpO2: 96% (02-10-20 @ 05:35) (95% - 97%)  Wt(kg): --Vital Signs Last 24 Hrs  T(C): 37.1 (10 Feb 2020 05:35), Max: 37.1 (10 Feb 2020 05:35)  T(F): 98.8 (10 Feb 2020 05:35), Max: 98.8 (10 Feb 2020 05:35)  HR: 84 (10 Feb 2020 05:35) (57 - 84)  BP: 143/78 (10 Feb 2020 05:35) (94/58 - 157/94)  BP(mean): --  RR: 16 (10 Feb 2020 05:35) (16 - 16)  SpO2: 96% (10 Feb 2020 05:35) (95% - 97%)    Constitutional: WDWN M resting comfortably in bed, NAD  Head: NC/AT  Eyes: EOMI, anicteric sclera  ENT: no nasal discharge. NG tube in place  Neck: supple  Respiratory: Coarse BS b/l, no wheeze. No increased WOB  Cardiac: regular rate, +S1/S2  Gastrointestinal: soft, NT/ND, +BS  Extremities: WWP  Dermatologic: skin warm, dry  Neurologic: Alert, opens eyes to voice. Does not follow commands. Withdraws to pain RUE, no withdrawal to noxious stimulus observed in other extremities. Does not track. PER, constricted.     Consultant(s) Notes Reviewed:  [x ] YES  [ ] NO  Care Discussed with Consultants/Other Providers [ x] YES  [ ] NO    LABS:                        13.0   7.16  )-----------( 183      ( 10 Feb 2020 06:23 )             40.2     02-10    146<H>  |  114<H>  |  20  ----------------------------<  109<H>  3.9   |  21<L>  |  1.44<H>    Ca    8.8      10 Feb 2020 06:23  Phos  2.6     02-10  Mg     2.1     02-10    TPro  5.8<L>  /  Alb  3.3  /  TBili  0.6  /  DBili  x   /  AST  41<H>  /  ALT  37  /  AlkPhos  53  02-10      Urinalysis Basic - ( 2020 12:39 )    Color: Yellow / Appearance: Clear / S.025 / pH: x  Gluc: x / Ketone: NEGATIVE  / Bili: Negative / Urobili: 0.2 E.U./dL   Blood: x / Protein: 30 mg/dL / Nitrite: NEGATIVE   Leuk Esterase: NEGATIVE / RBC: < 5 /HPF / WBC 5-10 /HPF   Sq Epi: x / Non Sq Epi: 0-5 /HPF / Bacteria: Present /HPF      CAPILLARY BLOOD GLUCOSE            Urinalysis Basic - ( 2020 12:39 )    Color: Yellow / Appearance: Clear / S.025 / pH: x  Gluc: x / Ketone: NEGATIVE  / Bili: Negative / Urobili: 0.2 E.U./dL   Blood: x / Protein: 30 mg/dL / Nitrite: NEGATIVE   Leuk Esterase: NEGATIVE / RBC: < 5 /HPF / WBC 5-10 /HPF   Sq Epi: x / Non Sq Epi: 0-5 /HPF / Bacteria: Present /HPF        acetaminophen    Suspension .. 650 milliGRAM(s) Oral every 8 hours PRN  ampicillin/sulbactam  IVPB 3 Gram(s) IV Intermittent every 6 hours  aspirin  chewable 81 milliGRAM(s) Oral daily  atorvastatin 80 milliGRAM(s) Oral at bedtime  clopidogrel Tablet 75 milliGRAM(s) Oral daily  enoxaparin Injectable 40 milliGRAM(s) SubCutaneous every 24 hours  levETIRAcetam  Solution 500 milliGRAM(s) Enteral Tube every 12 hours  modafinil 100 milliGRAM(s) Oral <User Schedule>  nystatin Powder 1 Application(s) Topical two times a day  sodium chloride 0.9%. 500 milliLiter(s) IV Continuous <Continuous>      RADIOLOGY & ADDITIONAL TESTS reviewed Medicine Consult Note    MERCY ADINA  64y  Male    HPI: 64 year old M with PMH dementia, HTN, CVA (2 mo ago) transferred from [place] for r/o CVA. Found to have a negative CT head on admission, however required intubation in setting of AMS for airway protection. Has since been extubated and concern for seizures as cause of altered mental status, was given keppra load. MRI significant for R MCA infarct. Has had HTN requiring gtt since discontinued, now transferred with continued altered mental status from [facility] to [facility] medical floor under neurology with medicine consult. Patient does not endorse any review of systems and is unable to participate in examination. Labs notable for downtrending sodium, stable creatinine, blood cultures negative. Has been on Unasyn for aspiration pneumonia given AMS, currently with NG tube in. Palliative consulted for goals of care discussion     Family history: unable to obtain  PSH: unable to obtain    Review of Systems:  Unable to participate in review of systems due to neurologic status      T(C): 37.1 (02-10-20 @ 05:35), Max: 37.1 (02-10-20 @ 05:35)  HR: 84 (02-10-20 @ 05:35) (57 - 84)  BP: 143/78 (02-10-20 @ 05:35) (94/58 - 157/94)  RR: 16 (02-10-20 @ 05:35) (16 - 16)  SpO2: 96% (02-10-20 @ 05:35) (95% - 97%)  Wt(kg): --Vital Signs Last 24 Hrs  T(C): 37.1 (10 Feb 2020 05:35), Max: 37.1 (10 Feb 2020 05:35)  T(F): 98.8 (10 Feb 2020 05:35), Max: 98.8 (10 Feb 2020 05:35)  HR: 84 (10 Feb 2020 05:35) (57 - 84)  BP: 143/78 (10 Feb 2020 05:35) (94/58 - 157/94)  BP(mean): --  RR: 16 (10 Feb 2020 05:35) (16 - 16)  SpO2: 96% (10 Feb 2020 05:35) (95% - 97%)    Constitutional: WDWN M resting comfortably in bed, NAD  Head: NC/AT  Eyes: EOMI, anicteric sclera  ENT: no nasal discharge. NG tube in place  Neck: supple  Respiratory: Coarse BS b/l, no wheeze. No increased WOB  Cardiac: regular rate, +S1/S2  Gastrointestinal: soft, NT/ND, +BS  Extremities: WWP  Dermatologic: skin warm, dry  Neurologic: Alert, opens eyes to voice. Does not follow commands. Withdraws to pain RUE, no withdrawal to noxious stimulus observed in other extremities. Does not track. PER, constricted.     Consultant(s) Notes Reviewed:  [x ] YES  [ ] NO  Care Discussed with Consultants/Other Providers [ x] YES  [ ] NO    LABS:                        13.0   7.16  )-----------( 183      ( 10 Feb 2020 06:23 )             40.2     02-10    146<H>  |  114<H>  |  20  ----------------------------<  109<H>  3.9   |  21<L>  |  1.44<H>    Ca    8.8      10 Feb 2020 06:23  Phos  2.6     02-10  Mg     2.1     02-10    TPro  5.8<L>  /  Alb  3.3  /  TBili  0.6  /  DBili  x   /  AST  41<H>  /  ALT  37  /  AlkPhos  53  02-10      Urinalysis Basic - ( 2020 12:39 )    Color: Yellow / Appearance: Clear / S.025 / pH: x  Gluc: x / Ketone: NEGATIVE  / Bili: Negative / Urobili: 0.2 E.U./dL   Blood: x / Protein: 30 mg/dL / Nitrite: NEGATIVE   Leuk Esterase: NEGATIVE / RBC: < 5 /HPF / WBC 5-10 /HPF   Sq Epi: x / Non Sq Epi: 0-5 /HPF / Bacteria: Present /HPF      CAPILLARY BLOOD GLUCOSE            Urinalysis Basic - ( 2020 12:39 )    Color: Yellow / Appearance: Clear / S.025 / pH: x  Gluc: x / Ketone: NEGATIVE  / Bili: Negative / Urobili: 0.2 E.U./dL   Blood: x / Protein: 30 mg/dL / Nitrite: NEGATIVE   Leuk Esterase: NEGATIVE / RBC: < 5 /HPF / WBC 5-10 /HPF   Sq Epi: x / Non Sq Epi: 0-5 /HPF / Bacteria: Present /HPF        acetaminophen    Suspension .. 650 milliGRAM(s) Oral every 8 hours PRN  ampicillin/sulbactam  IVPB 3 Gram(s) IV Intermittent every 6 hours  aspirin  chewable 81 milliGRAM(s) Oral daily  atorvastatin 80 milliGRAM(s) Oral at bedtime  clopidogrel Tablet 75 milliGRAM(s) Oral daily  enoxaparin Injectable 40 milliGRAM(s) SubCutaneous every 24 hours  levETIRAcetam  Solution 500 milliGRAM(s) Enteral Tube every 12 hours  modafinil 100 milliGRAM(s) Oral <User Schedule>  nystatin Powder 1 Application(s) Topical two times a day  sodium chloride 0.9%. 500 milliLiter(s) IV Continuous <Continuous>      RADIOLOGY & ADDITIONAL TESTS reviewed

## 2020-02-11 DIAGNOSIS — R45.1 RESTLESSNESS AND AGITATION: ICD-10-CM

## 2020-02-11 LAB
ANION GAP SERPL CALC-SCNC: 10 MMOL/L — SIGNIFICANT CHANGE UP (ref 5–17)
APTT BLD: 26 SEC — LOW (ref 27.5–36.3)
BLD GP AB SCN SERPL QL: NEGATIVE — SIGNIFICANT CHANGE UP
BUN SERPL-MCNC: 14 MG/DL — SIGNIFICANT CHANGE UP (ref 7–23)
CALCIUM SERPL-MCNC: 8.7 MG/DL — SIGNIFICANT CHANGE UP (ref 8.4–10.5)
CHLORIDE SERPL-SCNC: 109 MMOL/L — HIGH (ref 96–108)
CO2 SERPL-SCNC: 22 MMOL/L — SIGNIFICANT CHANGE UP (ref 22–31)
CREAT SERPL-MCNC: 1.13 MG/DL — SIGNIFICANT CHANGE UP (ref 0.5–1.3)
GLUCOSE SERPL-MCNC: 106 MG/DL — HIGH (ref 70–99)
HCT VFR BLD CALC: 38.7 % — LOW (ref 39–50)
HGB BLD-MCNC: 13.1 G/DL — SIGNIFICANT CHANGE UP (ref 13–17)
INR BLD: 1.19 — HIGH (ref 0.88–1.16)
MAGNESIUM SERPL-MCNC: 2 MG/DL — SIGNIFICANT CHANGE UP (ref 1.6–2.6)
MCHC RBC-ENTMCNC: 32.3 PG — SIGNIFICANT CHANGE UP (ref 27–34)
MCHC RBC-ENTMCNC: 33.9 GM/DL — SIGNIFICANT CHANGE UP (ref 32–36)
MCV RBC AUTO: 95.3 FL — SIGNIFICANT CHANGE UP (ref 80–100)
NRBC # BLD: 0 /100 WBCS — SIGNIFICANT CHANGE UP (ref 0–0)
PHOSPHATE SERPL-MCNC: 2.1 MG/DL — LOW (ref 2.5–4.5)
PLATELET # BLD AUTO: 186 K/UL — SIGNIFICANT CHANGE UP (ref 150–400)
POTASSIUM SERPL-MCNC: 3.5 MMOL/L — SIGNIFICANT CHANGE UP (ref 3.5–5.3)
POTASSIUM SERPL-SCNC: 3.5 MMOL/L — SIGNIFICANT CHANGE UP (ref 3.5–5.3)
PROTHROM AB SERPL-ACNC: 13.6 SEC — HIGH (ref 10–12.9)
RBC # BLD: 4.06 M/UL — LOW (ref 4.2–5.8)
RBC # FLD: 12.6 % — SIGNIFICANT CHANGE UP (ref 10.3–14.5)
RH IG SCN BLD-IMP: POSITIVE — SIGNIFICANT CHANGE UP
SODIUM SERPL-SCNC: 141 MMOL/L — SIGNIFICANT CHANGE UP (ref 135–145)
WBC # BLD: 6.38 K/UL — SIGNIFICANT CHANGE UP (ref 3.8–10.5)
WBC # FLD AUTO: 6.38 K/UL — SIGNIFICANT CHANGE UP (ref 3.8–10.5)

## 2020-02-11 PROCEDURE — 99233 SBSQ HOSP IP/OBS HIGH 50: CPT | Mod: GC

## 2020-02-11 PROCEDURE — 99231 SBSQ HOSP IP/OBS SF/LOW 25: CPT

## 2020-02-11 PROCEDURE — 99358 PROLONG SERVICE W/O CONTACT: CPT

## 2020-02-11 RX ORDER — LEVETIRACETAM 250 MG/1
500 TABLET, FILM COATED ORAL EVERY 12 HOURS
Refills: 0 | Status: DISCONTINUED | OUTPATIENT
Start: 2020-02-11 | End: 2020-02-11

## 2020-02-11 RX ORDER — ENOXAPARIN SODIUM 100 MG/ML
40 INJECTION SUBCUTANEOUS ONCE
Refills: 0 | Status: COMPLETED | OUTPATIENT
Start: 2020-02-12 | End: 2020-02-12

## 2020-02-11 RX ORDER — LEVETIRACETAM 250 MG/1
500 TABLET, FILM COATED ORAL EVERY 12 HOURS
Refills: 0 | Status: DISCONTINUED | OUTPATIENT
Start: 2020-02-11 | End: 2020-02-18

## 2020-02-11 RX ORDER — SODIUM CHLORIDE 9 MG/ML
1000 INJECTION, SOLUTION INTRAVENOUS
Refills: 0 | Status: DISCONTINUED | OUTPATIENT
Start: 2020-02-11 | End: 2020-02-13

## 2020-02-11 RX ORDER — ASPIRIN/CALCIUM CARB/MAGNESIUM 324 MG
81 TABLET ORAL DAILY
Refills: 0 | Status: DISCONTINUED | OUTPATIENT
Start: 2020-02-11 | End: 2020-02-18

## 2020-02-11 RX ORDER — CLOPIDOGREL BISULFATE 75 MG/1
75 TABLET, FILM COATED ORAL DAILY
Refills: 0 | Status: DISCONTINUED | OUTPATIENT
Start: 2020-02-11 | End: 2020-02-18

## 2020-02-11 RX ORDER — POTASSIUM CHLORIDE 20 MEQ
10 PACKET (EA) ORAL
Refills: 0 | Status: COMPLETED | OUTPATIENT
Start: 2020-02-11 | End: 2020-02-11

## 2020-02-11 RX ADMIN — ATORVASTATIN CALCIUM 80 MILLIGRAM(S): 80 TABLET, FILM COATED ORAL at 21:29

## 2020-02-11 RX ADMIN — Medication 81 MILLIGRAM(S): at 17:10

## 2020-02-11 RX ADMIN — AMPICILLIN SODIUM AND SULBACTAM SODIUM 200 GRAM(S): 250; 125 INJECTION, POWDER, FOR SUSPENSION INTRAMUSCULAR; INTRAVENOUS at 23:35

## 2020-02-11 RX ADMIN — AMPICILLIN SODIUM AND SULBACTAM SODIUM 200 GRAM(S): 250; 125 INJECTION, POWDER, FOR SUSPENSION INTRAMUSCULAR; INTRAVENOUS at 11:49

## 2020-02-11 RX ADMIN — Medication 100 MILLIEQUIVALENT(S): at 20:07

## 2020-02-11 RX ADMIN — Medication 100 MILLIEQUIVALENT(S): at 20:53

## 2020-02-11 RX ADMIN — NYSTATIN CREAM 1 APPLICATION(S): 100000 CREAM TOPICAL at 17:52

## 2020-02-11 RX ADMIN — CLOPIDOGREL BISULFATE 75 MILLIGRAM(S): 75 TABLET, FILM COATED ORAL at 17:10

## 2020-02-11 RX ADMIN — AMPICILLIN SODIUM AND SULBACTAM SODIUM 200 GRAM(S): 250; 125 INJECTION, POWDER, FOR SUSPENSION INTRAMUSCULAR; INTRAVENOUS at 05:36

## 2020-02-11 RX ADMIN — SODIUM CHLORIDE 80 MILLILITER(S): 9 INJECTION, SOLUTION INTRAVENOUS at 17:10

## 2020-02-11 RX ADMIN — LEVETIRACETAM 400 MILLIGRAM(S): 250 TABLET, FILM COATED ORAL at 11:19

## 2020-02-11 RX ADMIN — NYSTATIN CREAM 1 APPLICATION(S): 100000 CREAM TOPICAL at 05:43

## 2020-02-11 RX ADMIN — LEVETIRACETAM 500 MILLIGRAM(S): 250 TABLET, FILM COATED ORAL at 21:29

## 2020-02-11 RX ADMIN — AMPICILLIN SODIUM AND SULBACTAM SODIUM 200 GRAM(S): 250; 125 INJECTION, POWDER, FOR SUSPENSION INTRAMUSCULAR; INTRAVENOUS at 17:12

## 2020-02-11 RX ADMIN — Medication 100 MILLIEQUIVALENT(S): at 17:10

## 2020-02-11 NOTE — PROGRESS NOTE ADULT - PROBLEM SELECTOR PLAN 2
History of CVA, Per medical records patient with CVA, 2 months ago. 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    - c/w ASA 81mg PO daily and Plavix 75mg PO daily -> will hold in setting of possible PEG in AM   - c/w Atorvastatin 80mg PO daily  -LOC to be performed outpatient History of CVA, Per medical records patient with CVA, 2 months ago. 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    - c/w ASA 81mg PO daily and Plavix 75mg PO daily -> restart following PEG placement   - c/w Atorvastatin 80mg PO daily  - LOC to be performed outpatient

## 2020-02-11 NOTE — PROGRESS NOTE ADULT - PROBLEM SELECTOR PLAN 6
now improving, likely prerenal due to decreased PO intake  - free water flushes  - if Cr uptrending, would check renal US.  recommend UA today as spiked fever.  - Trend BMP   - Avoid nephrotoxic medications now improving, likely prerenal due to decreased PO intake  - free water flushes 250 q5hr   - Trend BMP   - Avoid nephrotoxic medications

## 2020-02-11 NOTE — CHART NOTE - NSCHARTNOTEFT_GEN_A_CORE
PALLIATIVE MEDICINE COORDINATION OF CARE NOTE FOR ADINA MADRID    Patient last seen on: 2/10/2020 in order to manage: GOC and was recommended: Continue with PEG and subsequent placement into [facility], preferably in [place] as per the brother.    30 Minutes; Start: 9:00am End: 9:30am of non-face-to-face prolonged service provided that relates to (face-to-face) care that has or will occur and ongoing patient management, including one or more of the following:   - Reviewed records from other physicians or other health care professional services, including one or more of the following: other medical records and diagnostic / radiology study results       - Other: Medication reviewed.    The patient HAS NOT used PRN's in the last 24h.    MEDICATIONS  (STANDING):  ampicillin/sulbactam  IVPB 3 Gram(s) IV Intermittent every 6 hours  atorvastatin 80 milliGRAM(s) Oral at bedtime  levETIRAcetam  IVPB 500 milliGRAM(s) IV Intermittent every 12 hours  modafinil 100 milliGRAM(s) Oral <User Schedule>  nystatin Powder 1 Application(s) Topical two times a day  MEDICATIONS  (PRN):  acetaminophen    Suspension .. 650 milliGRAM(s) Oral every 8 hours PRN Temp greater or equal to 38C (100.4F)  melatonin 5 milliGRAM(s) Oral at bedtime PRN Insomnia    - Other: Advanced directives      Full Code        No documented HCP form found on [facility]      No Living will / POA / Advance directives found on [facility] / [facility].      Documented GOC notes on [facility] on 2/10/2020    - Other: Coordination/Plan of care     1[facility] admission to [facility]      Current admission LOS: 11 days     LACE score: 6 ADVANCE ILLNESS PATIENT.     Patient NOT previously seen by palliative medicine consult service.      Decision maker: The patient does not demonstrate capacity for complex medical decision making given medical issues.   Legal surrogate: No documented HCP in paper chart/[facility]. No information on marital status or children. Documented information in the chart for brother Tim Madrid [phone] and "cousins" Brien Corderoandrea [phone], and Rafa Kevin [phone]. Parents not living. His son Albino [phone] living in [facility] would be the legal surrogate and has been coordinating decisions with brother Tim.

## 2020-02-11 NOTE — PROGRESS NOTE ADULT - SUBJECTIVE AND OBJECTIVE BOX
ADINA MADRID             MRN-[id number]    CC: [facility] Discussion    HPI:  History obtained from [facility] medical record, as patient altered and subsequently intubated.     63 y/o male with PMH hypertension CVA (2 months ago at [facility]), dementia and depression transferred from [facility] with concern for stroke (MCA). Patient presented to [facility] after presenting to pharmacy with shirt and shoes on backwards and episode of falling on floor and speech slurring in pharmacy, found to have left sided facial drooping of eyelids, slurred speech, and expressive aphasia on presentation to [facility]. Last known normal unknown. Baseline mental status AAOx3 able to follow commands, completes ADLs independently.     Patient outside of window for tPA, transferred to [facility] for possible thrombectomy. At [facility], stroke code called on admission - imaging negative for acute infarct. Thrombectomy of R MCA attempted, however aborted as noted to have stump occlusion of mid right M1 with extensive JOSEPHINE collaterals - likely chronic occulusion. Patient extubated and upon presentation to MICU, patient noted to be altered with tongue rolling back, with contraction of left upper and lower extremity - patient intubated for airway protection. Second stroke code called, with repeat imaging without evidence of acute infarct. Patients admitted to MICU, intubated pending vEEG.      [facility]: Vitals: T: Afebrile | HR: 91-93 | BP: 147-159/ (MAP: ) | RR: 20-22 |   Labs: BMP: WNL | UA: WNL | BAL <10 | Lipid panel: ; Cholesterol 176 |   Interventions: CTH: No acute intracranial abnormality; chronic microvascular ischemic and volume changes - old posterior right frontal cortical infarct noted.   CT Perfusion Brain w/ contrast: Right MCA abnormal flow pattern consistent with ischemic change; no discrete evidence of infarct.     [facility]: Vitals: T: Afebrile | HR: 90 | BP: 208/123 | RR: 14  Labs: WBC 6.88; Hgb 13; Plt 144; Cr 1.21; Glucose 104  CT Brain Stroke (on arrival): Gyriform sites of cortical hyperdensity, presumed contrast enhancement, are favored to be subacute sites of infarction, superimposed on more chronic sites of right MCA infarction.  CT Brain Stroke (2nd stroke code, following aborted thrombectomy): Compared to prior study from earlier in the day, no significant change in scattered enhancement of the right frontal and posterior temporal lobes, likely secondary to subacute infarction  CT Perfusion: Abnormal perfusion with RAPID calculated mismatch volume of 44 ml and mismatch ratio is infinite within the right MCA territory.  CT Angio Head: Probable high-grade stenosis of the right M1 segment rather than occlusion. Multifocal areas of narrowing involving the right A1 and left M1 segment which may be secondary to intracranial atherosclerosis.  CT Angio Neck: Normal CTA neck.     Interventions: Intubated and sedated s/p thrombectomy, vEEG placed (31 Jan 2020 19:25)    SUBJECTIVE: Lying in bed, not answering questions appropriately.     ROS: Unable to be obtained as not responding appropriately to questions   DYSPNEA: Y / N: N  NAUS/VOM: Y / N: N/A	  SECRETIONS: Y / N: N  AGITATION: Y / N: N  Pain (Y/N): N/A      -Provocation/Palliation:  -Quality/Quantity:  -Radiating:  -Severity:  -Timing/Frequency:  -Impact on ADLs:    OTHER REVIEW OF SYSTEMS:  UNABLE TO OBTAIN  due to: lack of response     PEx:  T(C): 36.5 (02-11-20 @ 05:17), Max: 37 (02-10-20 @ 13:02)  HR: 73 (02-11-20 @ 05:17) (73 - 77)  BP: 125/72 (02-11-20 @ 05:17) (125/72 - 158/89)  RR: 17 (02-11-20 @ 05:17) (16 - 17)  SpO2: 96% (02-11-20 @ 05:17) (96% - 100%)  Wt(kg): --    General: Awake alert in NAD but not interacting with encounter.   HEENT: NC/AT; PERRL, Non icteric, clear conjunctiva. Rightward gaze. Dry lips NGT+feeds  Neck: supple, no JVD,   Cardiovascular: +S1/S2; RRR, no M/R/G  Respiratory: CTA b/l; no W/R/R  Gastrointestinal: soft, NT/ND; +BSx4   : Lacy+  Extremities: WWP; 2+ peripheral pulses; no edema   Neurological: AAOx0; not able to follow commands, or respond to name, has spontaneous movements of the right upper extremity and right lower extremity, unable to move his left hand and leg.   Psych: Calm  Skin: Non jaundiced   Lymph: Normal    ALLERGIES: Allergy Status Unknown    OPIATE NAÏVE (Y/N): Yes    MEDICATIONS: REVIEWED  MEDICATIONS  (STANDING):  ampicillin/sulbactam  IVPB 3 Gram(s) IV Intermittent every 6 hours  atorvastatin 80 milliGRAM(s) Oral at bedtime  levETIRAcetam  IVPB 500 milliGRAM(s) IV Intermittent every 12 hours  modafinil 100 milliGRAM(s) Oral <User Schedule>  nystatin Powder 1 Application(s) Topical two times a day    MEDICATIONS  (PRN):  acetaminophen    Suspension .. 650 milliGRAM(s) Oral every 8 hours PRN Temp greater or equal to 38C (100.4F)  melatonin 5 milliGRAM(s) Oral at bedtime PRN Insomnia      LABS: REVIEWED  CBC:                        13.1   6.38  )-----------( 186      ( 11 Feb 2020 05:45 )             38.7     CMP:    02-11    141  |  109<H>  |  14  ----------------------------<  106<H>  3.5   |  22  |  1.13    Ca    8.7      11 Feb 2020 05:45  Phos  2.1     02-11  Mg     2.0     02-11    TPro  5.8<L>  /  Alb  3.3  /  TBili  0.6  /  DBili  x   /  AST  41<H>  /  ALT  37  /  AlkPhos  53  02-10    EXAM:  MR BRAIN                        PROCEDURE DATE:  02/04/2020    INTERPRETATION:  IKemar MD, have reviewed the images and the report and agree with the findings, with the following modification:   Technique: NoncontrastMRI of the brain using dedicated stroke protocol including axial diffusion weighted imaging, axial flair and axial GRE were obtained.  Agree with th findings of a large right MCA territorial infarction involving the right frontal and temporal lobes and the right basal ganglia. There is mass effect on the right lateral ventricle without significant midline shift.  Evaluation for hemorrhage is severely limited due to extensive motion artifact. There is apparent susceptibility related signal loss within the right thalamus and corona radiata which may be artifactual due to motion. Continued follow-up is recommended.  Agree with the finding of a chronic left parietal infarction.  ******PRELIMINARY REPORT******   PROCEDURE: MRI BRAIN without contrast  INDICATION: MCA occlusion, stroke.  TECHNIQUE: Axial T2, FLAIR and diffusion weighted images of the brain were obtained.  COMPARISON: Comparison is made to prior CT of the head from 1/31/2020.  FINDINGS: Limited study due to motion artifact. The MRI examination of the brain demonstrates the ventricles, cisternal spaces, and cortical sulci to be appropriate for the patient's stated age. There is an area of consolidation involving the left parietal lobe. Abnormal diffusion-weighted high intensity signal is seen in the right basal ganglia, posterior frontal lobe and temporal lobe, indicative of MCA territory infarct. There are also periventricular hyperintense foci consistent with small vessel ischemic disease. Unable to asses flow-voids. The visualized paranasal sinuses are free of mucosal disease. The mastoid air cells demonstrate hyperintense signal in the right mastoid air cell.  IMPRESSION: Limited study due to motion artifact, however there is right MCA territory infarct involving the basal ganglia, frontal lobe and temporal lobe as described above. There is also chronic encephalomalacia seen in the left parietal lobe, likely from prior infarction. Small vessel ischemic disease.    Xray Chest 1 View- PORTABLE-Urgent (02.06.20 @ 23:11)   EXAM:  XR CHEST PORTABLE URGENT 1V                        PROCEDURE DATE:  02/06/2020    ******PRELIMINARY REPORT******    ******PRELIMINARY REPORT******        INTERPRETATION:    XR CHEST URGENT dated 2/6/2020 11:11 PM  HISTORY: NGT Placement  COMPARISON: Chest x-ray from 2/6/2020.  FINDINGS: Partially visualized lower lung fields are clear. Opening of the enteric tube is seen in the area of the stomach. There are no pleural effusions. The cardiomediastinal silhouette, bones and soft tissues are unremarkable.  Nonspecific bowel gas pattern is identified within the abdomen.  IMPRESSION: Appropriate positioning of enteric tube.    IMAGING: REVIEWED  ADVANCED DIRECTIVES: FULL CODE    DECISION MAKER: Patient does not have the capacity to make medical decisions. No documented HCP in chart. Brother Tim Madrid [phone] is making medical decisions. Patient has 1 son who is in [facility]. [place] [phone].       PSYCHOSOCIAL-SPIRITUAL ASSESSMENT:       Reviewed    GOALS OF CARE DISCUSSION       Palliative care info/counseling provided	           Advanced Directives addressed please see Advance Care Planning Note	           Documentation of GOC  	      AGENCY CHOICE DISCUSSED:       Rehab    REFERRALS	        Palliative Med               Patient/Family Support       Music Therapy       PT/OT ADINA MADRID             MRN-[id number]    CC: Goals of Care Discussion    HPI:  History obtained from [facility] medical record, as patient altered and subsequently intubated.     63 y/o male with PMH hypertension CVA (2 months ago at [facility]), dementia and depression transferred from [facility] with concern for stroke (MCA). Patient presented to [facility] after presenting to pharmacy with shirt and shoes on backwards and episode of falling on floor and speech slurring in pharmacy, found to have left sided facial drooping of eyelids, slurred speech, and expressive aphasia on presentation to [facility]. Last known normal unknown. Baseline mental status AAOx3 able to follow commands, completes ADLs independently.     Patient outside of window for tPA, transferred to [facility] for possible thrombectomy. At [facility], stroke code called on admission - imaging negative for acute infarct. Thrombectomy of R MCA attempted, however aborted as noted to have stump occlusion of mid right M1 with extensive JOSEPHINE collaterals - likely chronic occulusion. Patient extubated and upon presentation to MICU, patient noted to be altered with tongue rolling back, with contraction of left upper and lower extremity - patient intubated for airway protection. Second stroke code called, with repeat imaging without evidence of acute infarct. Patients admitted to MICU, intubated pending vEEG.      [facility]: Vitals: T: Afebrile | HR: 91-93 | BP: 147-159/ (MAP: ) | RR: 20-22 |   Labs: BMP: WNL | UA: WNL | BAL <10 | Lipid panel: ; Cholesterol 176 |   Interventions: CTH: No acute intracranial abnormality; chronic microvascular ischemic and volume changes - old posterior right frontal cortical infarct noted.   CT Perfusion Brain w/ contrast: Right MCA abnormal flow pattern consistent with ischemic change; no discrete evidence of infarct.     [facility]: Vitals: T: Afebrile | HR: 90 | BP: 208/123 | RR: 14  Labs: WBC 6.88; Hgb 13; Plt 144; Cr 1.21; Glucose 104  CT Brain Stroke (on arrival): Gyriform sites of cortical hyperdensity, presumed contrast enhancement, are favored to be subacute sites of infarction, superimposed on more chronic sites of right MCA infarction.  CT Brain Stroke (2nd stroke code, following aborted thrombectomy): Compared to prior study from earlier in the day, no significant change in scattered enhancement of the right frontal and posterior temporal lobes, likely secondary to subacute infarction  CT Perfusion: Abnormal perfusion with RAPID calculated mismatch volume of 44 ml and mismatch ratio is infinite within the right MCA territory.  CT Angio Head: Probable high-grade stenosis of the right M1 segment rather than occlusion. Multifocal areas of narrowing involving the right A1 and left M1 segment which may be secondary to intracranial atherosclerosis.  CT Angio Neck: Normal CTA neck.     Interventions: Intubated and sedated s/p thrombectomy, vEEG placed (31 Jan 2020 19:25)    SUBJECTIVE: Lying in bed, not responding to questions or following commands. Overnight, he was agitated and pulled out his NGT and peripheral IVs. Plan for PEG placement today.    ROS: Unable to be obtained as not responding appropriately to questions   DYSPNEA: Y / N: N  NAUS/VOM: Y / N: N/A	  SECRETIONS: Y / N: N  AGITATION: Y / N: N  Pain (Y/N): N/A      -Provocation/Palliation:  -Quality/Quantity:  -Radiating:  -Severity:  -Timing/Frequency:  -Impact on ADLs:    OTHER REVIEW OF SYSTEMS:  UNABLE TO OBTAIN  due to: lack of response     PEx:  T(C): 36.5 (02-11-20 @ 05:17), Max: 37 (02-10-20 @ 13:02)  HR: 73 (02-11-20 @ 05:17) (73 - 77)  BP: 125/72 (02-11-20 @ 05:17) (125/72 - 158/89)  RR: 17 (02-11-20 @ 05:17) (16 - 17)  SpO2: 96% (02-11-20 @ 05:17) (96% - 100%)  Wt(kg): --    General: Awake alert in NAD but not interacting with encounter. Not following commands  HEENT: NC/AT; PERRL, Non icteric, clear conjunctiva. Rightward gaze  Neck: supple, no JVD,   Cardiovascular: +S1/S2; RRR, no M/R/G  Respiratory: CTA b/l; no W/R/R  Gastrointestinal: soft, NT/ND; +BSx4   : Lacy+  Extremities: WWP; 2+ peripheral pulses; no edema   Neurological: AAOx0; not able to follow commands, or respond to name, has spontaneous movements of the right upper extremity and right lower extremity, unable to move his left hand and leg.   Psych: Calm  Skin: Non jaundiced   Lymph: Normal    ALLERGIES: Allergy Status Unknown    OPIATE NAÏVE (Y/N): Yes    MEDICATIONS: REVIEWED  MEDICATIONS  (STANDING):  ampicillin/sulbactam  IVPB 3 Gram(s) IV Intermittent every 6 hours  atorvastatin 80 milliGRAM(s) Oral at bedtime  levETIRAcetam  IVPB 500 milliGRAM(s) IV Intermittent every 12 hours  modafinil 100 milliGRAM(s) Oral <User Schedule>  nystatin Powder 1 Application(s) Topical two times a day    MEDICATIONS  (PRN):  acetaminophen    Suspension .. 650 milliGRAM(s) Oral every 8 hours PRN Temp greater or equal to 38C (100.4F)  melatonin 5 milliGRAM(s) Oral at bedtime PRN Insomnia      LABS: REVIEWED  CBC:                        13.1   6.38  )-----------( 186      ( 11 Feb 2020 05:45 )             38.7     CMP:    02-11    141  |  109<H>  |  14  ----------------------------<  106<H>  3.5   |  22  |  1.13    Ca    8.7      11 Feb 2020 05:45  Phos  2.1     02-11  Mg     2.0     02-11    TPro  5.8<L>  /  Alb  3.3  /  TBili  0.6  /  DBili  x   /  AST  41<H>  /  ALT  37  /  AlkPhos  53  02-10    EXAM:  MR BRAIN                        PROCEDURE DATE:  02/04/2020    INTERPRETATION:  Kemar RAMIREZ MD, have reviewed the images and the report and agree with the findings, with the following modification:   Technique: NoncontrastMRI of the brain using dedicated stroke protocol including axial diffusion weighted imaging, axial flair and axial GRE were obtained.  Agree with th findings of a large right MCA territorial infarction involving the right frontal and temporal lobes and the right basal ganglia. There is mass effect on the right lateral ventricle without significant midline shift.  Evaluation for hemorrhage is severely limited due to extensive motion artifact. There is apparent susceptibility related signal loss within the right thalamus and corona radiata which may be artifactual due to motion. Continued follow-up is recommended.  Agree with the finding of a chronic left parietal infarction.  ******PRELIMINARY REPORT******   PROCEDURE: MRI BRAIN without contrast  INDICATION: MCA occlusion, stroke.  TECHNIQUE: Axial T2, FLAIR and diffusion weighted images of the brain were obtained.  COMPARISON: Comparison is made to prior CT of the head from 1/31/2020.  FINDINGS: Limited study due to motion artifact. The MRI examination of the brain demonstrates the ventricles, cisternal spaces, and cortical sulci to be appropriate for the patient's stated age. There is an area of consolidation involving the left parietal lobe. Abnormal diffusion-weighted high intensity signal is seen in the right basal ganglia, posterior frontal lobe and temporal lobe, indicative of MCA territory infarct. There are also periventricular hyperintense foci consistent with small vessel ischemic disease. Unable to asses flow-voids. The visualized paranasal sinuses are free of mucosal disease. The mastoid air cells demonstrate hyperintense signal in the right mastoid air cell.  IMPRESSION: Limited study due to motion artifact, however there is right MCA territory infarct involving the basal ganglia, frontal lobe and temporal lobe as described above. There is also chronic encephalomalacia seen in the left parietal lobe, likely from prior infarction. Small vessel ischemic disease.    Xray Chest 1 View- PORTABLE-Urgent (02.06.20 @ 23:11)   EXAM:  XR CHEST PORTABLE URGENT 1V                        PROCEDURE DATE:  02/06/2020    ******PRELIMINARY REPORT******    ******PRELIMINARY REPORT******        INTERPRETATION:    XR CHEST URGENT dated 2/6/2020 11:11 PM  HISTORY: NGT Placement  COMPARISON: Chest x-ray from 2/6/2020.  FINDINGS: Partially visualized lower lung fields are clear. Opening of the enteric tube is seen in the area of the stomach. There are no pleural effusions. The cardiomediastinal silhouette, bones and soft tissues are unremarkable.  Nonspecific bowel gas pattern is identified within the abdomen.  IMPRESSION: Appropriate positioning of enteric tube.    IMAGING: REVIEWED  ADVANCED DIRECTIVES: FULL CODE    DECISION MAKER: Patient does not have the capacity to make medical decisions. No documented HCP in chart. Brother Tim [place] [phone] is making medical decisions as his surrogate. Patient has 1 son who is in [facility]. [place] [phone] who will be unable to visit the [place] currently.     PSYCHOSOCIAL-SPIRITUAL ASSESSMENT:       Reviewed    GOALS OF CARE DISCUSSION       Palliative care info/counseling provided	           Advanced Directives addressed please see Advance Care Planning Note	           Documentation of GOC  	      AGENCY CHOICE DISCUSSED:       Rehab    REFERRALS	        Palliative Med               Patient/Family Support       Music Therapy       PT/OT ADINA MADRID             MRN-[id number]              ---SIGN OFF---    CC: Goals of Care Discussion    HPI:  65 y/o male with PMH hypertension CVA (2 months ago at [facility]), dementia and depression transferred from [facility] with concern for stroke (MCA). Patient presented to [facility] after presenting to pharmacy with shirt and shoes on backwards and episode of falling on floor and speech slurring in pharmacy, found to have left sided facial drooping of eyelids, slurred speech, and expressive aphasia on presentation to [place]. Last known normal unknown. Baseline mental status AAOx3 able to follow commands, completes ADLs independently.     Patient outside of window for tPA, transferred to [facility] for possible thrombectomy. At [facility], stroke code called on admission - imaging negative for acute infarct. Thrombectomy of R MCA attempted, however aborted as noted to have stump occlusion of mid right M1 with extensive JOSEPHINE collaterals - likely chronic occulusion. Patient extubated and upon presentation to MICU, patient noted to be altered with tongue rolling back, with contraction of left upper and lower extremity - patient intubated for airway protection. Second stroke code called, with repeat imaging without evidence of acute infarct. Patients admitted to MICU, intubated pe      SUBJECTIVE: Lying in bed, not responding to questions or following commands. Overnight, he was agitated and pulled out his NGT and peripheral IVs. Plan for PEG placement today.    ROS: Unable to be obtained as not responding appropriately to questions   DYSPNEA: Y / N: No  NAUS/VOM: Y / N: Unable to assess	  SECRETIONS: Y / N: No  AGITATION: Y / N: No  Pain (Y/N): RANDI pain scale: 2  -Provocation/Palliation: Unable to assess  -Quality/Quantity: Unable to assess  -Radiating: Unable to assess  -Severity: No pain  -Timing/Frequency: Unable to assess  -Impact on ADLs: Unable to assess    OTHER REVIEW OF SYSTEMS UNABLE TO OBTAIN  due to: lack of response     PEx:  T(C): 36.5 (02-11-20 @ 05:17), Max: 37 (02-10-20 @ 13:02)  HR: 73 (02-11-20 @ 05:17) (73 - 77)  BP: 125/72 (02-11-20 @ 05:17) (125/72 - 158/89)  RR: 17 (02-11-20 @ 05:17) (16 - 17)  SpO2: 96% (02-11-20 @ 05:17) (96% - 100%)  Wt(kg): 82.3Kg    General: Awake alert in NAD but not interacting with encounter. Not following commands  HEENT: NC/AT; PERRL, Non icteric, clear conjunctiva. Rightward gaze  Neck: supple, no JVD,   Cardiovascular: +S1/S2; RRR, no M/R/G  Respiratory: CTA b/l; no W/R/R  Gastrointestinal: soft, NT/ND; +BSx4   : Lacy+  Extremities: WWP; 2+ peripheral pulses; no edema   Neurological: AAOx0; not able to follow commands, or respond to name, has spontaneous movements of the right upper extremity and right lower extremity, unable to move his left hand and leg.   Psych: Calm  Skin: Non jaundiced   Lymph: Normal    ALLERGIES: Allergy Status Unknown    OPIATE NAÏVE (Y/N): Yes    MEDICATIONS: REVIEWED  MEDICATIONS  (STANDING):  ampicillin/sulbactam  IVPB 3 Gram(s) IV Intermittent every 6 hours  atorvastatin 80 milliGRAM(s) Oral at bedtime  levETIRAcetam  IVPB 500 milliGRAM(s) IV Intermittent every 12 hours  modafinil 100 milliGRAM(s) Oral <User Schedule>  nystatin Powder 1 Application(s) Topical two times a day  MEDICATIONS  (PRN):  acetaminophen    Suspension .. 650 milliGRAM(s) Oral every 8 hours PRN Temp greater or equal to 38C (100.4F)  melatonin 5 milliGRAM(s) Oral at bedtime PRN Insomnia    LABS: REVIEWED  CBC:                        13.1   6.38  )-----------( 186      ( 11 Feb 2020 05:45 )             38.7     CMP:    02-11    141  |  109<H>  |  14  ----------------------------<  106<H>  3.5   |  22  |  1.13    Ca    8.7      11 Feb 2020 05:45  Phos  2.1     02-11  Mg     2.0     02-11    TPro  5.8<L>  /  Alb  3.3  /  TBili  0.6  /  DBili  x   /  AST  41<H>  /  ALT  37  /  AlkPhos  53  02-10    IMAGING: REVIEWED    EXAM:  MR BRAIN                        PROCEDURE DATE:  02/04/2020    INTERPRETATION:  IKemar MD, have reviewed the images and the report and agree with the findings, with the following modification:   Technique: NoncontrastMRI of the brain using dedicated stroke protocol including axial diffusion weighted imaging, axial flair and axial GRE were obtained.  Agree with th findings of a large right MCA territorial infarction involving the right frontal and temporal lobes and the right basal ganglia. There is mass effect on the right lateral ventricle without significant midline shift.  Evaluation for hemorrhage is severely limited due to extensive motion artifact. There is apparent susceptibility related signal loss within the right thalamus and corona radiata which may be artifactual due to motion. Continued follow-up is recommended.  Agree with the finding of a chronic left parietal infarction.  ******PRELIMINARY REPORT******   PROCEDURE: MRI BRAIN without contrast  INDICATION: MCA occlusion, stroke.  TECHNIQUE: Axial T2, FLAIR and diffusion weighted images of the brain were obtained.  COMPARISON: Comparison is made to prior CT of the head from 1/31/2020.  FINDINGS: Limited study due to motion artifact. The MRI examination of the brain demonstrates the ventricles, cisternal spaces, and cortical sulci to be appropriate for the patient's stated age. There is an area of consolidation involving the left parietal lobe. Abnormal diffusion-weighted high intensity signal is seen in the right basal ganglia, posterior frontal lobe and temporal lobe, indicative of MCA territory infarct. There are also periventricular hyperintense foci consistent with small vessel ischemic disease. Unable to asses flow-voids. The visualized paranasal sinuses are free of mucosal disease. The mastoid air cells demonstrate hyperintense signal in the right mastoid air cell.  IMPRESSION: Limited study due to motion artifact, however there is right MCA territory infarct involving the basal ganglia, frontal lobe and temporal lobe as described above. There is also chronic encephalomalacia seen in the left parietal lobe, likely from prior infarction. Small vessel ischemic disease.    Xray Chest 1 View- PORTABLE-Urgent (02.06.20 @ 23:11)   EXAM:  XR CHEST PORTABLE URGENT 1V                        PROCEDURE DATE:  02/06/2020    ******PRELIMINARY REPORT******    ******PRELIMINARY REPORT******        INTERPRETATION:    XR CHEST URGENT dated 2/6/2020 11:11 PM  HISTORY: NGT Placement  COMPARISON: Chest x-ray from 2/6/2020.  FINDINGS: Partially visualized lower lung fields are clear. Opening of the enteric tube is seen in the area of the stomach. There are no pleural effusions. The cardiomediastinal silhouette, bones and soft tissues are unremarkable.  Nonspecific bowel gas pattern is identified within the abdomen.  IMPRESSION: Appropriate positioning of enteric tube.    ADVANCED DIRECTIVES:   Full Code    No documented HCP form found on [facility]  No Living will / POA / Advance directives found on [place] / [facility].  No documented GOC notes on [place]    Decision maker: The patient does not demonstrate capacity for complex medical decision making given medical issues.   Legal surrogate: No documented HCP in paper chart/[place]/[facility]. No information on marital status or children. Documented information in the chart for brother Tim Madrid [phone] and "cousins" Brien Dominique [phone], and Rafa Kevin [phone]. Parents not living. His son Albino [phone] living in [facility] would be the legal surrogate and has been coordinating decisions with brother Tim.      PSYCHOSOCIAL-SPIRITUAL ASSESSMENT:       Reviewed    GOALS OF CARE DISCUSSION       Palliative care info/counseling provided       Documentation of GOC: Full code. Plan for PEG today then [facility] with ultimate goal of taking patient down to [place].   	      REFERRALS	        Unit SW/Case Mgmt       Speech/Swallow       Nutrition/Dietician       PT/OT

## 2020-02-11 NOTE — PROGRESS NOTE ADULT - ASSESSMENT
65 y/o M with PMHx of HTN and CVA (2 months ago) transferred from [facility] s/p stroke presenting for possible thrombectomy, as patient outside window for tPA - c/b reintubation following thrombectomy, now with metabolic encephalopathy, concern for seizure vs acute stroke. MRI with R MCA infarct. Palliative team consulted for goals of care discussion and support services. 63 y/o M with PMHx of HTN and CVA (2 months ago) transferred from [facility] s/p stroke presenting for possible thrombectomy, as patient outside window for tPA - c/b reintubation following thrombectomy, now with metabolic encephalopathy, concern for seizure vs acute stroke. MRI with R MCA infarct. Palliative team consulted for goals of care discussion and support services. 65 y/o M with PMHx of HTN and CVA (2 months ago) transferred from [facility] s/p stroke presenting for possible thrombectomy, as patient outside window for tPA - c/b reintubation following thrombectomy, now with metabolic encephalopathy, concern for seizure vs acute stroke. MRI with R MCA infarct. Palliative team consulted for goals of care discussion and support services.

## 2020-02-11 NOTE — PROGRESS NOTE ADULT - PROBLEM SELECTOR PLAN 5
Patient Na beginning to trend down  - C/w with tube feeds with Jevity  - Free water flushes 250 cc q 4 hours Patient Na beginning to trend down  - C/w with tube feeds with Jevity 1.2 at 65cc/hr -> restart feeds 24 hours after PEG tube placement   - Free water flushes 250 cc q 4 hours

## 2020-02-11 NOTE — PROGRESS NOTE ADULT - SUBJECTIVE AND OBJECTIVE BOX
**Incomplete Note**    OVERNIGHT EVENTS:    SUBJECTIVE / INTERVAL HPI: Patient seen and examined at bedside.     VITAL SIGNS:  Vital Signs Last 24 Hrs  T(C): 36.5 (2020 05:17), Max: 37 (10 Feb 2020 13:02)  T(F): 97.7 (2020 05:17), Max: 98.6 (10 Feb 2020 13:02)  HR: 73 (2020 05:17) (73 - 95)  BP: 125/72 (2020 05:17) (125/72 - 158/89)  BP(mean): --  RR: 17 (2020 05:17) (16 - 17)  SpO2: 96% (2020 05:17) (96% - 100%)    PHYSICAL EXAM:    General: WDWN  HEENT: NC/AT; PERRL, anicteric sclera; MMM  Neck: supple  Cardiovascular: +S1/S2; RRR  Respiratory: CTA B/L; no W/R/R  Gastrointestinal: soft, NT/ND; +BSx4  Extremities: WWP; no edema, clubbing or cyanosis  Vascular: 2+ radial, DP/PT pulses B/L  Neurological: AAOx3; no focal deficits    MEDICATIONS:  MEDICATIONS  (STANDING):  ampicillin/sulbactam  IVPB 3 Gram(s) IV Intermittent every 6 hours  atorvastatin 80 milliGRAM(s) Oral at bedtime  levETIRAcetam  Solution 500 milliGRAM(s) Enteral Tube every 12 hours  modafinil 100 milliGRAM(s) Oral <User Schedule>  nystatin Powder 1 Application(s) Topical two times a day  sodium chloride 0.9%. 500 milliLiter(s) (50 mL/Hr) IV Continuous <Continuous>    MEDICATIONS  (PRN):  acetaminophen    Suspension .. 650 milliGRAM(s) Oral every 8 hours PRN Temp greater or equal to 38C (100.4F)  melatonin 5 milliGRAM(s) Oral at bedtime PRN Insomnia      ALLERGIES:  Allergies    Allergy Status Unknown    Intolerances        LABS:                        13.1   6.38  )-----------( 186      ( 2020 05:45 )             38.7     02-11    141  |  109<H>  |  14  ----------------------------<  106<H>  3.5   |  22  |  1.13    Ca    8.7      2020 05:45  Phos  2.1     02-11  Mg     2.0     02-11    TPro  5.8<L>  /  Alb  3.3  /  TBili  0.6  /  DBili  x   /  AST  41<H>  /  ALT  37  /  AlkPhos  53  02-10    PT/INR - ( 2020 05:45 )   PT: 13.6 sec;   INR: 1.19          PTT - ( 2020 05:45 )  PTT:26.0 sec  Urinalysis Basic - ( 2020 12:39 )    Color: Yellow / Appearance: Clear / S.025 / pH: x  Gluc: x / Ketone: NEGATIVE  / Bili: Negative / Urobili: 0.2 E.U./dL   Blood: x / Protein: 30 mg/dL / Nitrite: NEGATIVE   Leuk Esterase: NEGATIVE / RBC: < 5 /HPF / WBC 5-10 /HPF   Sq Epi: x / Non Sq Epi: 0-5 /HPF / Bacteria: Present /HPF      CAPILLARY BLOOD GLUCOSE          RADIOLOGY & ADDITIONAL TESTS: Reviewed.    ASSESSMENT:    PLAN: OVERNIGHT EVENTS: Patient pulled out NGT and peripheral IV access. Peripheral access reobtained, will hold off on NGT in setting of PEG placement today. Afebrile. VSS. HD stable.     SUBJECTIVE / INTERVAL HPI: Patient seen and examined at bedside this AM, unable to obtain ROS as patient AAOx0.    VITAL SIGNS:  Vital Signs Last 24 Hrs  T(C): 36.5 (2020 05:17), Max: 37 (10 Feb 2020 13:02)  T(F): 97.7 (2020 05:17), Max: 98.6 (10 Feb 2020 13:02)  HR: 73 (2020 05:17) (73 - 95)  BP: 125/72 (2020 05:17) (125/72 - 158/89)  BP(mean): --  RR: 17 (2020 05:17) (16 - 17)  SpO2: 96% (2020 05:17) (96% - 100%)    PHYSICAL EXAM:    General: middle aged  male resting comfortably in bed; agitated attempting to get out of bed  HEENT: NC/AT; PERRL, anicteric sclera; MMM  Neck: supple; no JVD; no cervical or submandibular lymphadenopathy   Cardiovascular: +S1/S2; RRR  Respiratory: CTA B/L; no W/R/R  Gastrointestinal: soft, NT/ND; +BSx4  Extremities: WWP; no edema, clubbing or cyanosis  Vascular: 2+ radial, DP/PT pulses B/L  Neurological: AAOx0, tracks fingers, and maintains gaze. Responsive to name.     MEDICATIONS:  MEDICATIONS  (STANDING):  ampicillin/sulbactam  IVPB 3 Gram(s) IV Intermittent every 6 hours  atorvastatin 80 milliGRAM(s) Oral at bedtime  levETIRAcetam  Solution 500 milliGRAM(s) Enteral Tube every 12 hours  modafinil 100 milliGRAM(s) Oral <User Schedule>  nystatin Powder 1 Application(s) Topical two times a day  sodium chloride 0.9%. 500 milliLiter(s) (50 mL/Hr) IV Continuous <Continuous>    MEDICATIONS  (PRN):  acetaminophen    Suspension .. 650 milliGRAM(s) Oral every 8 hours PRN Temp greater or equal to 38C (100.4F)  melatonin 5 milliGRAM(s) Oral at bedtime PRN Insomnia      ALLERGIES:  Allergies    Allergy Status Unknown    Intolerances        LABS:                        13.1   6.38  )-----------( 186      ( 2020 05:45 )             38.7     02-11    141  |  109<H>  |  14  ----------------------------<  106<H>  3.5   |  22  |  1.13    Ca    8.7      2020 05:45  Phos  2.1     11  Mg     2.0     11    TPro  5.8<L>  /  Alb  3.3  /  TBili  0.6  /  DBili  x   /  AST  41<H>  /  ALT  37  /  AlkPhos  53  02-10    PT/INR - ( 2020 05:45 )   PT: 13.6 sec;   INR: 1.19          PTT - ( 2020 05:45 )  PTT:26.0 sec  Urinalysis Basic - ( 2020 12:39 )    Color: Yellow / Appearance: Clear / S.025 / pH: x  Gluc: x / Ketone: NEGATIVE  / Bili: Negative / Urobili: 0.2 E.U./dL   Blood: x / Protein: 30 mg/dL / Nitrite: NEGATIVE   Leuk Esterase: NEGATIVE / RBC: < 5 /HPF / WBC 5-10 /HPF   Sq Epi: x / Non Sq Epi: 0-5 /HPF / Bacteria: Present /HPF      CAPILLARY BLOOD GLUCOSE          RADIOLOGY & ADDITIONAL TESTS: Reviewed.    ASSESSMENT:    PLAN:

## 2020-02-11 NOTE — PROGRESS NOTE ADULT - PROBLEM SELECTOR PLAN 4
Support provided to patient and family. Patient to have access to supportive services during rest of hospital stay as the patient/family deemed necessary ie. Chaplaincy, Massage therapy, Music therapy, Patient and family supportive services, Palliative SW, etc.    As discussed during the palliative IDT meeting and as identified during the patients PSSA screening the patient would benefit from: Music therapy

## 2020-02-11 NOTE — PROGRESS NOTE ADULT - PROBLEM SELECTOR PLAN 3
Patient febrile to 100.4 2/9. CXR clear, UA without evidence of infection. Blood cultures NGTD.   - vanc/zosyn d/c, patient likely aspirated, switch to Unasyn 3g q6hr (7 days total 2/10-2/16)  - f/u blood cultures  - aspiration precautions   - pending PEG tube Patient febrile to 100.4 2/9. CXR clear, UA without evidence of infection. Blood cultures NGTD. UA neg.  - vanc/zosyn d/c, patient likely aspirated, switch to Unasyn 3g q6hr (7 days total 2/10-2/16)  - blood cultures NGTD (24 hr)  - aspiration precautions   - pending PEG tube Patient febrile to 100.4 2/9. CXR clear, UA without evidence of infection. Blood cultures NGTD. UA neg.  - vanc/zosyn d/c, patient likely aspirated, switch to Unasyn 3g q6hr (7 days total 2/10-2/16)  - blood cultures NGTD (24 hr)  - aspiration precautions

## 2020-02-11 NOTE — PROGRESS NOTE ADULT - ASSESSMENT
1. CVA, AMS  - Management as per neuro primary  - Palliative discussion for GOC  - C/w ASA, plavix, high intensity statin. Antiplatelet held for PEG at present    2. HTN  - S/p nicardipine gtt, currently resolved. No meds at present, continue to monitor bp    3. Aspiration pna  - C/w unasyn monitor CBC     4. GOC  - F/u palliative care recs

## 2020-02-11 NOTE — PROGRESS NOTE ADULT - PROBLEM SELECTOR PLAN 2
Discussed patient's diagnosis and prognosis with his brother Tim Villanueva [phone]. The brother states he has been making medical decisions with the patient's son Albino, who is in [facility]. He states they video chat frequently and he keeps him informed regarding medical updates. He is aware that the patient needs a PEG and he states he discussed it with Albino and both agree to move forward. They would like the patient to be moved to [place] as the brother lives there however he is aware that he needs complex medical care. He would prefer the patient to be placed in a rehab in [place] as it is cheaper for him to live and visit the patient there. Currently plan for PEG tomorrow and rehab placement. List of options of rehab places to be provided to patient by SW/QUIQUE. The brother states that the son will be unable to visit the patient since he can't come to the [facility]. Discussed patient's diagnosis and prognosis with his brother Tim Villanueva [phone]. The brother states he has been making medical decisions with the patient's son Albino, who is in [facility]. He states they video chat frequently and he keeps him informed regarding medical updates. He is aware that the patient needs a PEG and he states he discussed it with Albino and both agree to move forward. They would like the patient to be moved to [place] as the brother lives there however he is aware that he needs complex medical care. He would prefer the patient to be placed in a rehab in [place] as it is cheaper for him to live and visit the patient there. Currently plan for PEG today and rehab placement. List of options of rehab places to be provided to patient by SW/QUIQUE. The brother states that the son will be unable to visit the patient since he can't come to the [facility]. 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   -Patient now more alert at times, though still lethargic/minimally responsive to questions and commands. Was agitated overnight and removed his NGT and peripheral IV. This morning, calm and lying in bed in no acute distress.   Management as per primary team.

## 2020-02-11 NOTE — CHART NOTE - NSCHARTNOTEFT_GEN_A_CORE
Around 5AM informed by RN that the patient had been more agitated in the morning and removed both his NG tube and his IV despite R wrist restrain and mitten.     Patient has been NPO--with tube feeds held--since midnight as he is pending PEG-tube placement today 2/11/20. RNs replacing IV.

## 2020-02-11 NOTE — PROGRESS NOTE ADULT - SUBJECTIVE AND OBJECTIVE BOX
Medicine Consult Note    ADINA MADRID  64y  Male    INTERVAL HPI/OVERNIGHT EVENTS: Pulled NG tube, periph IV replaced      Review of Systems:  Unable to participate in review of systems due to neurologic status      T(C): 36.5 (20 @ 05:17), Max: 37 (02-10-20 @ 13:02)  HR: 73 (20 @ 05:17) (73 - 77)  BP: 125/72 (20 @ 05:17) (125/72 - 158/89)  RR: 17 (20 @ 05:17) (16 - 17)  SpO2: 96% (20 @ 05:17) (96% - 100%)  Wt(kg): --Vital Signs Last 24 Hrs  T(C): 36.5 (2020 05:17), Max: 37 (10 Feb 2020 13:02)  T(F): 97.7 (2020 05:17), Max: 98.6 (10 Feb 2020 13:02)  HR: 73 (2020 05:17) (73 - 77)  BP: 125/72 (2020 05:17) (125/72 - 158/89)  BP(mean): --  RR: 17 (2020 05:17) (16 - 17)  SpO2: 96% (2020 05:17) (96% - 100%)    Constitutional: WDWN M resting comfortably in bed  Head: NC/AT  Eyes: EOMI, anicteric sclera  ENT: no nasal discharge  Neck: supple  Respiratory: Coarse BS b/l, no wheeze. No increased WOB  Cardiac: regular rate, +S1/S2  Gastrointestinal: soft, NT/ND, +BS  Extremities: WWP  Dermatologic: skin warm, dry  Neurologic: Alert, opens eyes to voice. Does not follow commands. Withdraws to pain RUE, no withdrawal to noxious stimulus observed in other extremities. PER, constricted.     Consultant(s) Notes Reviewed:  [x ] YES  [ ] NO  Care Discussed with Consultants/Other Providers [ x] YES  [ ] NO    LABS:                        13.1   6.38  )-----------( 186      ( 2020 05:45 )             38.7     0211    141  |  109<H>  |  14  ----------------------------<  106<H>  3.5   |  22  |  1.13    Ca    8.7      2020 05:45  Phos  2.1     02-11  Mg     2.0     -11    TPro  5.8<L>  /  Alb  3.3  /  TBili  0.6  /  DBili  x   /  AST  41<H>  /  ALT  37  /  AlkPhos  53  02-10    PT/INR - ( 2020 05:45 )   PT: 13.6 sec;   INR: 1.19          PTT - ( 2020 05:45 )  PTT:26.0 sec  Urinalysis Basic - ( 2020 12:39 )    Color: Yellow / Appearance: Clear / S.025 / pH: x  Gluc: x / Ketone: NEGATIVE  / Bili: Negative / Urobili: 0.2 E.U./dL   Blood: x / Protein: 30 mg/dL / Nitrite: NEGATIVE   Leuk Esterase: NEGATIVE / RBC: < 5 /HPF / WBC 5-10 /HPF   Sq Epi: x / Non Sq Epi: 0-5 /HPF / Bacteria: Present /HPF      CAPILLARY BLOOD GLUCOSE            Urinalysis Basic - ( 2020 12:39 )    Color: Yellow / Appearance: Clear / S.025 / pH: x  Gluc: x / Ketone: NEGATIVE  / Bili: Negative / Urobili: 0.2 E.U./dL   Blood: x / Protein: 30 mg/dL / Nitrite: NEGATIVE   Leuk Esterase: NEGATIVE / RBC: < 5 /HPF / WBC 5-10 /HPF   Sq Epi: x / Non Sq Epi: 0-5 /HPF / Bacteria: Present /HPF        acetaminophen    Suspension .. 650 milliGRAM(s) Oral every 8 hours PRN  ampicillin/sulbactam  IVPB 3 Gram(s) IV Intermittent every 6 hours  atorvastatin 80 milliGRAM(s) Oral at bedtime  levETIRAcetam  Solution 500 milliGRAM(s) Enteral Tube every 12 hours  melatonin 5 milliGRAM(s) Oral at bedtime PRN  modafinil 100 milliGRAM(s) Oral <User Schedule>  nystatin Powder 1 Application(s) Topical two times a day      RADIOLOGY & ADDITIONAL TESTS reviewed

## 2020-02-11 NOTE — PROGRESS NOTE ADULT - PROBLEM SELECTOR PLAN 3
Support provided to patient and family. Patient to have access to supportive services during rest of hospital stay as the patient/family deemed necessary ie. Chaplaincy, Massage therapy, Music therapy, Patient and family supportive services, Palliative SW, etc. Discussed patient's diagnosis and prognosis with his brother Tim Villanueva [phone]. The brother states he has been making medical decisions with the patient's son Albino, who is in [facility]. He states they video chat frequently and he keeps him informed regarding medical updates. He is aware that the patient needs a PEG and he states he discussed it with Albino and both agree to move forward. They would like the patient to be moved to [place] as the brother lives there however he is aware that he needs complex medical care. He would prefer the patient to be placed in a rehab in [place] as it is cheaper for him to live and visit the patient there. Currently plan for PEG today and rehab placement. List of options of rehab places to be provided to patient by SW/QUIQEU. The brother states that the son will be unable to visit the patient since he can't come to the [facility].

## 2020-02-11 NOTE — PROGRESS NOTE ADULT - PROBLEM SELECTOR PLAN 1
likely 2/2 R MCA infarct affecting the basal ganglia, frontal, and temporal lobes  -management per neuro  - c/w Keppra 500mg BID  - minimize ativan and sedation  - PT recommending of acute rehab facility vs [facility]    #Depression   Per family, patient with history of depression. Previously on sertraline, now on modafenil 100mg BID.    - home trazadone 100mg held (pt non-arousable)  - holding home meds in setting of lethargy

## 2020-02-11 NOTE — PROGRESS NOTE ADULT - PROBLEM SELECTOR PLAN 9
F: N/A  E: Replete K < 4.0, and Mg <2.0   N: Jevity 1.2 @ goal 65cc/hr, NPO at midnight for PEG   SCD: Lovenox

## 2020-02-11 NOTE — PROGRESS NOTE ADULT - ASSESSMENT
65 y/o M with PMHx of HTN and CVA (2 months ago) transferred from [facility] s/p stroke (left facial and eyelid drooling and speech slurring presenting for possible thrombectomy, as patient outside window for tPA - c/b reintubation following thrombectomy, now with metabolic encephalopathy 2/2 stroke 65 y/o M with PMHx of HTN and CVA (2 months ago) transferred from [facility] s/p stroke (left facial and eyelid drooling and speech slurring presenting for possible thrombectomy, as patient outside window for tPA - c/b reintubation following thrombectomy, now with metabolic encephalopathy 2/2 stroke, pending PEG placement.

## 2020-02-11 NOTE — PROGRESS NOTE ADULT - PROBLEM SELECTOR PLAN 1
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   -Patient now more alert at times, though still lethargic/minimally responsive to questions and commands. Was agitated overnight and removed his NGT and peripheral IV. This morning, calm and lying in bed in no acute distress.     Management as per primary team. Reports of agitation overnight with removal of NGT and IV lines.    Consider Haldol 0.5mg PO/PEG q12h standing if QTc wnl

## 2020-02-12 PROBLEM — Z00.00 ENCOUNTER FOR PREVENTIVE HEALTH EXAMINATION: Status: ACTIVE | Noted: 2020-02-12

## 2020-02-12 LAB
ANION GAP SERPL CALC-SCNC: 11 MMOL/L — SIGNIFICANT CHANGE UP (ref 5–17)
BUN SERPL-MCNC: 16 MG/DL — SIGNIFICANT CHANGE UP (ref 7–23)
CALCIUM SERPL-MCNC: 8.9 MG/DL — SIGNIFICANT CHANGE UP (ref 8.4–10.5)
CHLORIDE SERPL-SCNC: 110 MMOL/L — HIGH (ref 96–108)
CO2 SERPL-SCNC: 20 MMOL/L — LOW (ref 22–31)
CREAT SERPL-MCNC: 1.19 MG/DL — SIGNIFICANT CHANGE UP (ref 0.5–1.3)
GLUCOSE SERPL-MCNC: 100 MG/DL — HIGH (ref 70–99)
HCT VFR BLD CALC: 40.5 % — SIGNIFICANT CHANGE UP (ref 39–50)
HGB BLD-MCNC: 13.4 G/DL — SIGNIFICANT CHANGE UP (ref 13–17)
MAGNESIUM SERPL-MCNC: 2.1 MG/DL — SIGNIFICANT CHANGE UP (ref 1.6–2.6)
MCHC RBC-ENTMCNC: 32.1 PG — SIGNIFICANT CHANGE UP (ref 27–34)
MCHC RBC-ENTMCNC: 33.1 GM/DL — SIGNIFICANT CHANGE UP (ref 32–36)
MCV RBC AUTO: 97.1 FL — SIGNIFICANT CHANGE UP (ref 80–100)
NRBC # BLD: 0 /100 WBCS — SIGNIFICANT CHANGE UP (ref 0–0)
PLATELET # BLD AUTO: 197 K/UL — SIGNIFICANT CHANGE UP (ref 150–400)
POTASSIUM SERPL-MCNC: 4 MMOL/L — SIGNIFICANT CHANGE UP (ref 3.5–5.3)
POTASSIUM SERPL-SCNC: 4 MMOL/L — SIGNIFICANT CHANGE UP (ref 3.5–5.3)
RBC # BLD: 4.17 M/UL — LOW (ref 4.2–5.8)
RBC # FLD: 12.6 % — SIGNIFICANT CHANGE UP (ref 10.3–14.5)
SODIUM SERPL-SCNC: 141 MMOL/L — SIGNIFICANT CHANGE UP (ref 135–145)
WBC # BLD: 10.41 K/UL — SIGNIFICANT CHANGE UP (ref 3.8–10.5)
WBC # FLD AUTO: 10.41 K/UL — SIGNIFICANT CHANGE UP (ref 3.8–10.5)

## 2020-02-12 PROCEDURE — 99231 SBSQ HOSP IP/OBS SF/LOW 25: CPT

## 2020-02-12 PROCEDURE — 99233 SBSQ HOSP IP/OBS HIGH 50: CPT

## 2020-02-12 RX ORDER — IPRATROPIUM/ALBUTEROL SULFATE 18-103MCG
3 AEROSOL WITH ADAPTER (GRAM) INHALATION ONCE
Refills: 0 | Status: COMPLETED | OUTPATIENT
Start: 2020-02-12 | End: 2020-02-12

## 2020-02-12 RX ADMIN — AMPICILLIN SODIUM AND SULBACTAM SODIUM 200 GRAM(S): 250; 125 INJECTION, POWDER, FOR SUSPENSION INTRAMUSCULAR; INTRAVENOUS at 05:22

## 2020-02-12 RX ADMIN — CLOPIDOGREL BISULFATE 75 MILLIGRAM(S): 75 TABLET, FILM COATED ORAL at 11:25

## 2020-02-12 RX ADMIN — SODIUM CHLORIDE 80 MILLILITER(S): 9 INJECTION, SOLUTION INTRAVENOUS at 01:01

## 2020-02-12 RX ADMIN — ATORVASTATIN CALCIUM 80 MILLIGRAM(S): 80 TABLET, FILM COATED ORAL at 22:50

## 2020-02-12 RX ADMIN — AMPICILLIN SODIUM AND SULBACTAM SODIUM 200 GRAM(S): 250; 125 INJECTION, POWDER, FOR SUSPENSION INTRAMUSCULAR; INTRAVENOUS at 11:25

## 2020-02-12 RX ADMIN — LEVETIRACETAM 500 MILLIGRAM(S): 250 TABLET, FILM COATED ORAL at 22:50

## 2020-02-12 RX ADMIN — NYSTATIN CREAM 1 APPLICATION(S): 100000 CREAM TOPICAL at 18:27

## 2020-02-12 RX ADMIN — NYSTATIN CREAM 1 APPLICATION(S): 100000 CREAM TOPICAL at 05:34

## 2020-02-12 RX ADMIN — AMPICILLIN SODIUM AND SULBACTAM SODIUM 200 GRAM(S): 250; 125 INJECTION, POWDER, FOR SUSPENSION INTRAMUSCULAR; INTRAVENOUS at 18:25

## 2020-02-12 RX ADMIN — Medication 81 MILLIGRAM(S): at 11:25

## 2020-02-12 RX ADMIN — LEVETIRACETAM 500 MILLIGRAM(S): 250 TABLET, FILM COATED ORAL at 11:25

## 2020-02-12 RX ADMIN — Medication 3 MILLILITER(S): at 10:14

## 2020-02-12 RX ADMIN — MODAFINIL 100 MILLIGRAM(S): 200 TABLET ORAL at 11:25

## 2020-02-12 RX ADMIN — MODAFINIL 100 MILLIGRAM(S): 200 TABLET ORAL at 05:21

## 2020-02-12 RX ADMIN — Medication 5 MILLIGRAM(S): at 23:05

## 2020-02-12 RX ADMIN — ENOXAPARIN SODIUM 40 MILLIGRAM(S): 100 INJECTION SUBCUTANEOUS at 05:21

## 2020-02-12 NOTE — PROGRESS NOTE ADULT - PROBLEM SELECTOR PLAN 8
Patient AAOx0, unable to make decisions. Next of kin is son in [facility]. Brother would like to take brother to [place] to pursue rehab.   - PEG today   - Family would like patient in [place] [facility] Patient AAOx0, unable to make decisions. Next of kin is son in [facility]. Brother would like to take brother to [place] to pursue rehab.   - s/p PEG  - Family would like patient in [facility], pending placement

## 2020-02-12 NOTE — PROGRESS NOTE ADULT - PROBLEM SELECTOR PLAN 5
Patient Na beginning to trend down  - C/w with tube feeds with Jevity 1.2 at 65cc/hr -> restart feeds 24 hours after PEG tube placement   - Free water flushes 250 cc q 4 hours Patient Na beginning to trend down  - C/w with tube feeds with Jevity 1.2 at 65cc/hr, feeds restarted 2/12  - Free water flushes 250 cc q 4 hours

## 2020-02-12 NOTE — PROGRESS NOTE ADULT - PROBLEM SELECTOR PLAN 9
F: N/A  E: Replete K < 4.0, and Mg <2.0   N: Jevity 1.2 @ goal 65cc/hr, NPO at midnight for PEG   SCD: Lovenox F: N/A  E: Replete K < 4.0, and Mg <2.0   N: Jevity 1.2 @ goal 65cc/hr  SCD: Lovenox

## 2020-02-12 NOTE — PROGRESS NOTE ADULT - NSHPATTENDINGPLANDISCUSS_GEN_ALL_CORE
7 [facility] team
MICU team
HS, SW/CM
7 [place] team
HS
MICU team
MICU team
as above
as above
MICU team
7 [facility] team

## 2020-02-12 NOTE — PROGRESS NOTE ADULT - ATTENDING COMMENTS
Patient seen and examined with NP.  Agree with above.  Patient calmer today but still not responding much to commands.  Fever overnight.  Awaiting MRI Brain without bakari (can be short stroke protocol) to evaluate if new infarct or exacerbation of recent stroke by toxic metabolic encephalopathy.
Patient seen and examined with PA.  Agree with above.  Patient more awake today as no sedation given so more cooperative with exam.      Reviewed MR Head No Cont (02.04.20 @ 21:15) showing large right MCA territorial infarction involving the right frontal and temporal lobes and the right basal ganglia. There is mass effect on the right lateral ventricle without significant midline shift.  - This would explain his left hemiplegia and right gaze preference.  He can cross almost all the way to left today.  - It doesn't explain why he's not talking or interacting more.    No obvious etiology for his stroke so I think there is benefit for LOC to rule out clot.  He may be eligible for loop recorder placement    Treatment of dysphagia and fevers, as per MICU team.
Patient seen and examined with PA.  Agree with above.  Patient altered this morning post agitation overnight.  Hard to differentiate between new deficits and toxic metabolic encephalopathy overlay.  Therefore, would appreciate MRI brain to document if new stroke or just exacerbation of old stroke.  Treatment of presumed infection, possibly aspiration PNA, as per MICU team.
Seen after PEG was placed, calm, non verbal  Will minimize restrain use  TOV today  Plan for PEG upon discharge  C/w unasyn for aspiration pneumonia-total of 5-7 days  BMI of 31 --> Obesity  Rest as above
Patient seen and examined with house-staff during bedside rounds.  Resident note read, including vitals, physical findings, laboratory data, and radiological reports.   Revisions included below.  Direct personal management at bed side and extensive interpretation of the data.  Plan was outlined and discussed in details with the housestaff.  Decision making of high complexity  Action taken for acute disease activity to reflect the level of care provided:  - medication reconciliation  - review laboratory data  continue MV  DC sedation   PSV trial once awake  follow on [place]  on Keppra  on antiplatelet and statin  start feed  BP parameters as per neuro  off nicardipine
Patient seen and examined with house-staff during bedside rounds.  Resident note read, including vitals, physical findings, laboratory data, and radiological reports.   Revisions included below.  Direct personal management at bed side and extensive interpretation of the data.  Plan was outlined and discussed in details with the housestaff.  Decision making of high complexity  Action taken for acute disease activity to reflect the level of care provided:  - medication reconciliation  - review laboratory data  events reviewed  - continue MV  - continue keppra  - follow on eeg  - follow with stroke team  - he is sedated and unable to access neuro status  follow BP recommendations by stroke team  - start tube feed  follow echo  on statin  and antiplatelet
a/  remains non verbal   does not follow commands  s/p peg  on tube feeds  afebrile  no cough    p/  unasyn  cont tube feeds  needs JERAMY  recommend dc'ing cyclobenzaprine  trend Na  cont nacl tabs
Saw and evaluated the patient with Dr Bradford, medical resident on palliative rotation, during bedside rounds. Agree with above findings and recommendations that were discussed with me at bedside and during discussion of the patients case.
Saw and evaluated the patient with Dr Bradford, medical resident on palliative rotation, during bedside rounds. Agree with above findings and recommendations that were discussed with me at bedside and during discussion of the patients case.    Patient on second visit was off the floors for PEG placement. Anticipate placement into [facility] after tolerating feeds. Family wishes confirmed. Plan for rehab after [facility] stay and subsequently taking patient to [place] where patient's brother and extended family could coordinate his care.    Palliative medicine will sign off for now. Please reconsult if the patients symptoms change and need to be reassessed, the patients goals of care need to be readdressed based on clinical changes, or if patient is readmitted in the future.
Addendum to above    Called by resident for hypotension 94/58, decreased alertness in setting fever this morning.    fever, AMS, hypotension - aspiration PNA vs. other source infection vs. new neurologic event, seizure.  - Responded to 1L NS bolus, repeat /64.    - Check new set labs including lactate, blood cx.    - repeat cxr  - recommend broad spectrum abx coverage,  Vanc Zosyn.  - f/u neuro - consider EEG, re imaging   - if patient becomes hypotensive again, would consult ICU/7lach
Patient seen and examined.  Agree with above.  Left hemiplegia secondary to right MCA infarct secondary to intracranial stenosis  Course complicated by dysphagia requiring PEG  Febrile this morning 100.4F with hypotension requiring IVF.  Now on antibiotics    Plan:  1)Secondary stroke prevention  -Continue ASA 81mg PO daily and Plavix 75mg PO daily   -Continue Atorvastatin 80mg PO daily     2) Stroke risk factors  -HTN - c/w home Amlodipine, hold if hypotensive  -Prev CVA (baseline dysarthria and left hand weakness)    3) Further workup   -Due to severe reaction to benzos, there is a concern for safety of doing LOC as patient would likely receive versed during this procedure; however, LOC would be beneficial for stroke workup as it could potentially  if there is a clot in the left atrium. Given pt's continued altered mental status, will continue to hold off on LOC  -Continue Keppra 500mg BID for seizure prophylaxis    4) Continue modafinil twice a day - 7 am and 1 pm    5) GI - Surgery on board for PEG placement.  Now on NGT feeds    6) Toxic metabolic encephalopathy - Agree with Zosyn/Vanco for antibiotics.  Monitor vital signs.    7) DVT prophylaxis - Lovenox SQ and SCDs    8) Dispo - acute rehab when workup complete
Patient seen and examined with house-staff during bedside rounds  Resident note read, including vitals, physical findings, laboratory data, and radiological reports.   Revisions included below.  Case discussed with House staff  Direct personal management at bedside  and extensive interpretation of data. Decision making of high complexity.

## 2020-02-12 NOTE — PROGRESS NOTE ADULT - SUBJECTIVE AND OBJECTIVE BOX
Pt seen and examined at bedside  No new c/o  Remains non communicative  No emesis, diarrhea, bleeding      MEDICATIONS:  MEDICATIONS  (STANDING):  ampicillin/sulbactam  IVPB 3 Gram(s) IV Intermittent every 6 hours  aspirin  chewable 81 milliGRAM(s) Oral daily  atorvastatin 80 milliGRAM(s) Oral at bedtime  clopidogrel Tablet 75 milliGRAM(s) Oral daily  levETIRAcetam  Solution 500 milliGRAM(s) Enteral Tube every 12 hours  modafinil 100 milliGRAM(s) Oral <User Schedule>  nystatin Powder 1 Application(s) Topical two times a day  sodium chloride 0.45%. 1000 milliLiter(s) (80 mL/Hr) IV Continuous <Continuous>    MEDICATIONS  (PRN):  acetaminophen    Suspension .. 650 milliGRAM(s) Oral every 8 hours PRN Temp greater or equal to 38C (100.4F)  melatonin 5 milliGRAM(s) Oral at bedtime PRN Insomnia      Allergies  Allergy Status Unknown    Intolerances      Vital Signs Last 24 Hrs  T(C): 36.8 (12 Feb 2020 11:58), Max: 37.2 (12 Feb 2020 05:53)  T(F): 98.3 (12 Feb 2020 11:58), Max: 98.9 (12 Feb 2020 05:53)  HR: 77 (12 Feb 2020 11:58) (77 - 79)  BP: 145/93 (12 Feb 2020 11:58) (145/93 - 157/99)  BP(mean): --  RR: 16 (12 Feb 2020 11:58) (16 - 16)  SpO2: 96% (12 Feb 2020 11:58) (96% - 96%)    02-11 @ 07:01  -  02-12 @ 07:00  --------------------------------------------------------  IN: 560 mL / OUT: 1325 mL / NET: -765 mL    02-12 @ 07:01 - 02-12 @ 21:44  --------------------------------------------------------  IN: 0 mL / OUT: 350 mL / NET: -350 mL        PHYSICAL EXAM:  GEN: elderly M lying in bed in no distress, anicteric, afebrile, not pale  Gastrointestinal: full, abdominal binder in place, PEG+ clean dry site, soft, BS+, NT, NR, NG no masses or organs palpated  Extremities: no limb edema  Neurological: awake, non communicative and not following commands  Skin: intact      LABS:  CBC Full  -  ( 12 Feb 2020 06:48 )  WBC Count : 10.41 K/uL  RBC Count : 4.17 M/uL  Hemoglobin : 13.4 g/dL  Hematocrit : 40.5 %  Platelet Count - Automated : 197 K/uL  Mean Cell Volume : 97.1 fl  Mean Cell Hemoglobin : 32.1 pg  Mean Cell Hemoglobin Concentration : 33.1 gm/dL    141  |  110<H>  |  16  ----------------------------<  100<H>  4.0   |  20<L>  |  1.19    Ca    8.9      12 Feb 2020 06:48  Phos  2.1     02-11  Mg     2.1     02-12    PT/INR - ( 11 Feb 2020 05:45 )   PT: 13.6 sec;   INR: 1.19       PTT - ( 11 Feb 2020 05:45 )  PTT:26.0 sec        RADIOLOGY & ADDITIONAL STUDIES (The following images were personally reviewed):

## 2020-02-12 NOTE — PROGRESS NOTE ADULT - PROBLEM SELECTOR PLAN 6
now improving, likely prerenal due to decreased PO intake  - free water flushes 250 q5hr   - Trend BMP   - Avoid nephrotoxic medications

## 2020-02-12 NOTE — PROGRESS NOTE ADULT - ASSESSMENT
64yr old M with PMHx significant for HTN, CVA, Dementia, Depression, transferred from [facility] for management of CVA in setting of AMS slurred speech, gait instability, L facial droop, and expressive aphasia.     # Dysphagia -  - 2/2 CVA  - sp PEG placement 2/11/2020  - ok for meds and feeds today  - PEG care per floor protocol  - rest of care per primary team      Discussed with attending Dr Cisse.  GI will sign off for now please reconsult as needed

## 2020-02-12 NOTE — PROGRESS NOTE ADULT - PROBLEM SELECTOR PLAN 3
Patient febrile to 100.4 2/9. CXR clear, UA without evidence of infection. Blood cultures NGTD. UA neg.  - vanc/zosyn d/c, patient likely aspirated, switch to Unasyn 3g q6hr (7 days total 2/10-2/16)  - blood cultures NGTD (24 hr)  - aspiration precautions Patient febrile to 100.4 2/9. CXR clear, UA without evidence of infection. Blood cultures NGTD. UA neg.  - vanc/zosyn d/c, patient likely aspirated, switch to Unasyn 3g q6hr (7 days total 2/10-2/16)  - blood cultures NGTD   - aspiration precautions

## 2020-02-12 NOTE — PROGRESS NOTE ADULT - ASSESSMENT
1. CVA, AMS  - Management as per neuro primary  - Palliative discussion for GOC  - C/w ASA, plavix, high intensity statin. Antiplatelet held for PEG at present    2. HTN  - S/p nicardipine gtt, currently resolved. No meds at present, continue to monitor bp    3. Aspiration pna  - C/w unasyn monitor CBC, for 5-7 day duration     4. GOC  - F/u palliative care recs

## 2020-02-12 NOTE — PROGRESS NOTE ADULT - PROBLEM SELECTOR PLAN 7
#Overflow incontinence:   Patient with increase urinary output, concern for overflow incontinence. Goldberg in place.   - c/w Doxazosin 1mg 2mg qd at bedtime  - d/c goldberg with TOV following PEG #Overflow incontinence: RESOLVED  Patient with increase urinary output, concern for overflow incontinence. Lacy removed 2/12 -> passed TOV  - c/w Doxazosin 1mg 2mg qd at bedtime

## 2020-02-12 NOTE — PROGRESS NOTE ADULT - ASSESSMENT
63 y/o M with PMHx of HTN and CVA (2 months ago) transferred from [facility] s/p stroke (left facial and eyelid drooling and speech slurring presenting for possible thrombectomy, as patient outside window for tPA - c/b reintubation following thrombectomy, now with metabolic encephalopathy 2/2 stroke, pending PEG placement.

## 2020-02-12 NOTE — PROGRESS NOTE ADULT - SUBJECTIVE AND OBJECTIVE BOX
Cardiology Progress Note  Emily Luu MRN: [id number]  Age: 64 year old, : 1954  Date: 2019            Assessment and Plan:     Emily Luu is 64 year old female with a history of Marfan syndrome, aortic dissection repair, s/p AVR and MVR, OHT in 10/2012 c/b by multiple episodes of acute rejection and invasive aspergillosis who was admitted with failure to thrive and JUWAN thought to be secondary to UTI and supratherapeutic tacrolimus levels. 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 Patient is currently followed by the MICU team.     Recommendations:  - No new recommendations at this time      History of NICM s/p OHT in 10/2012  Chronic graft dysfunction, history of multiple episodes of acute rejection  Marfan syndrome complicated by aortic dissection  S/p AVR and MVR  Currently on single immunosuppressive agent. Cellcept was stopped in 2018.  - Tacrolimus level 10 on 2/6 AM. Held 2/6 PM dose of tacrolimus. Tacrolimus reduced to 3 mg qAM/2 mg qPM. Repeat tacrolimus level on Fri    Acute hypoxic respiratory failure  Bilateral chylothoraces with bilateral chest tubes  - Lymphoscintigraphy did not show a clear leak but several foci of incresed uptake on the left of the vertebral column - plan to manage conservatively  - If chylous leakage persists, Thoracic Surgery recommends IR embolization  - Management per primary team    Severe malnutrition: On TPN. Will need to continue TPN for minimum of 7 total days (started on ). If chylous leakage persists, Thoracic Surgery recommends IR embolization    JUWAN on CKD: Cr improving. Previously required HD, not currently on HD.    Subacute subdural hematomas: Had bleeding in R frontal and parietal lobes. Neuro Crit was following.     Pancytopenia: Etiology  **Incomplete Note**    OVERNIGHT EVENTS:    SUBJECTIVE / INTERVAL HPI: Patient seen and examined at bedside.     VITAL SIGNS:  Vital Signs Last 24 Hrs  T(C): 37.2 (12 Feb 2020 05:53), Max: 37.2 (12 Feb 2020 05:53)  T(F): 98.9 (12 Feb 2020 05:53), Max: 98.9 (12 Feb 2020 05:53)  HR: 79 (12 Feb 2020 05:53) (77 - 79)  BP: 157/99 (12 Feb 2020 05:53) (129/85 - 157/99)  BP(mean): --  RR: 16 (12 Feb 2020 05:53) (16 - 18)  SpO2: 96% (12 Feb 2020 05:53) (96% - 96%)    PHYSICAL EXAM:    General: WDWN  HEENT: NC/AT; PERRL, anicteric sclera; MMM  Neck: supple  Cardiovascular: +S1/S2; RRR  Respiratory: CTA B/L; no W/R/R  Gastrointestinal: soft, NT/ND; +BSx4  Extremities: WWP; no edema, clubbing or cyanosis  Vascular: 2+ radial, DP/PT pulses B/L  Neurological: AAOx3; no focal deficits    MEDICATIONS:  MEDICATIONS  (STANDING):  ampicillin/sulbactam  IVPB 3 Gram(s) IV Intermittent every 6 hours  aspirin  chewable 81 milliGRAM(s) Oral daily  atorvastatin 80 milliGRAM(s) Oral at bedtime  clopidogrel Tablet 75 milliGRAM(s) Oral daily  levETIRAcetam  Solution 500 milliGRAM(s) Enteral Tube every 12 hours  modafinil 100 milliGRAM(s) Oral <User Schedule>  nystatin Powder 1 Application(s) Topical two times a day  sodium chloride 0.45%. 1000 milliLiter(s) (80 mL/Hr) IV Continuous <Continuous>    MEDICATIONS  (PRN):  acetaminophen    Suspension .. 650 milliGRAM(s) Oral every 8 hours PRN Temp greater or equal to 38C (100.4F)  melatonin 5 milliGRAM(s) Oral at bedtime PRN Insomnia      ALLERGIES:  Allergies    Allergy Status Unknown    Intolerances        LABS:                        13.1   6.38  )-----------( 186      ( 11 Feb 2020 05:45 )             38.7     02-11    141  |  109<H>  |  14  ----------------------------<  106<H>  3.5   |  22  |  1.13    Ca    8.7      11 Feb 2020 05:45  Phos  2.1     02-11  Mg     2.0     02-11      PT/INR - ( 11 Feb 2020 05:45 )   PT: 13.6 sec;   INR: 1.19          PTT - ( 11 Feb 2020 05:45 )  PTT:26.0 sec    CAPILLARY BLOOD GLUCOSE          RADIOLOGY & ADDITIONAL TESTS: Reviewed.    ASSESSMENT:    PLAN: "uncertain      Appreciate MICU team's assistance.     Patient was discussed with staff attending, Dr. Alberto, who agrees with the above assessment and plan.      Bea Mccurdy MD, PhD  Cardiology Fellow  Pager: [phone]     .  I have reviewed today's vital signs, notes, medications, labs and imaging. I have also seen and examined the patient and agree with the findings and plan as outlined above.    Anita Alberto MD  Section Head - Advanced Heart Failure, Transplantation and Mechanical Circulatory Support  Director - [facility]  Associate Professor of Medicine, [facility]           Subjective/Interval Events     No acute events overnight. Notes that chest pain at chest tube sites has mildly improved from yesterday. Denies chest pain. Extubated this AM. Patient seen prior to extubation this AM.          Objective     BP (!) 158/102   Pulse 114   Temp 97.5  F (36.4  C) (Axillary)   Resp 24   Ht 1.778 m (5' 10\")   Wt 55.7 kg (122 lb 12.7 oz)   SpO2 100%   BMI 17.62 kg/m    Temp:  [97.4  F (36.3  C)-97.9  F (36.6  C)] 97.5  F (36.4  C)  Heart Rate:  [63-87] 86  Resp:  [20-28] 24  BP: ()/() 158/102  FiO2 (%):  [30 %] 30 %  SpO2:  [95 %-100 %] 100 %  Wt Readings from Last 2 Encounters:   02/07/19 55.7 kg (122 lb 12.7 oz)   01/11/19 55.6 kg (122 lb 9.6 oz)     I/O last 3 completed shifts:  In: 2150.75 [I.V.:461; NG/GT:365]  Out: 2260 [Urine:1170; Chest Tube:1090]      Gen: No acute distress, lying in bed  HEENT: sclera white, intubated this AM  PULM/THORAX: coarse mechanical breath sounds, bibasilar crackles, diminished left-sided breath sounds, pectus carinatum, serous/white chest tube output  CV: RRR, normal S1 and S2, no murmurs  ABD: Soft, NTND, no rebound, no guarding, bowel sounds present, no masses  EXT: WWP. No LE edema  NEURO: Able to mouth responds and write responses. Able to nod and shake head in response to questions            " Data:     Recent Results (from the past 24 hour(s))   CT Chest w/o Contrast    Collection Time: 02/06/19  2:25 PM   Result Value Ref Range    Radiologist flags Deep suctioning (Urgent)    Phosphorus    Collection Time: 02/07/19  4:37 AM   Result Value Ref Range    Phosphorus 3.7 2.5 - 4.5 mg/dL   CBC with platelets    Collection Time: 02/07/19  4:37 AM   Result Value Ref Range    WBC 3.2 (L) 4.0 - 11.0 10e9/L    RBC Count 3.02 (L) 3.8 - 5.2 10e12/L    Hemoglobin 8.0 (L) 11.7 - 15.7 g/dL    Hematocrit 26.5 (L) 35.0 - 47.0 %    MCV 88 78 - 100 fl    MCH 26.5 26.5 - 33.0 pg    MCHC 30.2 (L) 31.5 - 36.5 g/dL    RDW 15.9 (H) 10.0 - 15.0 %    Platelet Count 156 150 - 450 10e9/L   INR (AM Draw)    Collection Time: 02/07/19  4:37 AM   Result Value Ref Range    INR 1.29 (H) 0.86 - 1.14   Basic metabolic panel    Collection Time: 02/07/19  4:37 AM   Result Value Ref Range    Sodium 136 133 - 144 mmol/L    Potassium 4.1 3.4 - 5.3 mmol/L    Chloride 104 94 - 109 mmol/L    Carbon Dioxide 21 20 - 32 mmol/L    Anion Gap 11 3 - 14 mmol/L    Glucose 174 (H) 70 - 99 mg/dL    Urea Nitrogen 155 (H) 7 - 30 mg/dL    Creatinine 2.77 (H) 0.52 - 1.04 mg/dL    GFR Estimate 17 (L) >60 mL/min/[1.73_m2]    GFR Estimate If Black 20 (L) >60 mL/min/[1.73_m2]    Calcium 9.0 8.5 - 10.1 mg/dL     Recent Results (from the past 24 hour(s))   CT Chest w/o Contrast   Result Value    Radiologist flags Deep suctioning (Urgent)    Narrative    EXAMINATION: CT CHEST W/O CONTRAST, 2/6/2019 2:25 PM    TECHNIQUE:  Helical CT images from the thoracic inlet through the lung  bases were obtained without IV contrast. Contrast dose: None    COMPARISON: 2/6/2019, 2/3/2019, 12/5/2016    HISTORY: Pleural effusion; worsening chylothorax despite chest tubes    FINDINGS:    Endotracheal tube tip at the thoracic inlet. Extensive debris in the  left lung with extensive mucous plugging in the left lower lobe and  near complete collapse. Increased intralobular septal  thickening and  groundglass opacities in the apices. Trace bilateral  hydropneumothoraces with chest tubes in place. Numerous pulmonary  nodules are stable to decreased in size since 2016.    Postoperative changes from cardiac transplantation with unchanged  cardiomegaly. Postprocedural changes from TEVAR.  Grossly unchanged  size of the thoracic aorta with the descending aorta measuring up to  6.0 cm. Postoperative changes from bypass graft from the aortic  annulus to the brachiocephalic, left common carotid and left  subclavian arteries. No pericardial effusion. Normal caliber and  configuration of the thoracic great vessels. No thoracic adenopathy.    Limited views of the abdomen reveal no worrisome pathology. Pectus  carniatum . Renal osteodystrophy. No acute osseous abnormality. Left  upper approach PICC with tip in the mid SVC. Enteric tube coursing  below the field-of-view of this study.      Impression    IMPRESSION:   1. Extensive debris in the trachea and throughout the left lower lobe,  with associated near complete collapse left lower lobe. Recommend deep  suctioning. Nodularity in the upper lobes may represent infection.  2. Tiny bilateral hydropneumothoraces with chest tubes in place.  3. Unchanged appearance of the enlarged transplanted heart as well as  the aneurysmal thoracic aorta.    [Access Center: Deep suctioning]    This report will be copied to the [facility] to ensure a  provider acknowledges the finding. Access Center is available Monday  through Friday 8am-3:30 pm.     I have personally reviewed the examination and initial interpretation  and I agree with the findings.    JULIO C ROB MD   XR Chest Port 1 View    Narrative    Exam: XR CHEST PORT 1 VW, 2/7/2019 6:22 AM    Indication: Intubated, s/p heart transplant    Comparison: CT chest 2/6/2019, chest x-ray 2/6/2019    Findings:   Portable semiupright AP radiograph of the chest. Endotracheal tube tip  projects 6.1 cm above  the daisy. Right upper extremity PICC with tip  projecting over the mid SVC. Bibasilar pigtail catheters are stable in  position. Postsurgical changes of aortic dissection repair. Marked  improvement in aeration of the left lung since the prior exam. Diffuse  bilateral interstitial and airspace opacities throughout both lungs.  No pneumothorax. Significant pleural effusion. The visualized upper  abdomen appears unremarkable.      Impression    Impression:   1. Endotracheal tube tip projects 6.1 cm above the daisy. Other  support devices as described above.  2. Marked improvement in aeration of the left lung since the prior  exam.  3. Diffuse bilateral interstitial and airspace opacities, which likely  represent pulmonary edema.    I have personally reviewed the examination and initial interpretation  and I agree with the findings.    JULIO C ROB MD             Medications     Current Facility-Administered Medications   Medication Last Rate     - MEDICATION INSTRUCTIONS -       dexmedetomidine Stopped (02/07/19 0900)     IV fluid REPLACEMENT ONLY       - MEDICATION INSTRUCTIONS -       - MEDICATION INSTRUCTIONS -       parenteral nutrition - ADULT compounded formula       parenteral nutrition - ADULT compounded formula 45 mL/hr at 02/06/19 2114     - MEDICATION INSTRUCTIONS -       sodium chloride 10 mL/hr at 01/26/19 0700       Current Facility-Administered Medications   Medication Dose Route Frequency     calcium carbonate 600 mg-vitamin D 400 units  1 tablet Oral BID     heparin lock flush  5-10 mL Intracatheter Q24H     heparin  5,000 Units Subcutaneous Q8H     hydrALAZINE  50 mg Oral TID     HYDROmorphone  2 mg Oral Q4H     lipids  250 mL Intravenous Once per day on Mon Tue Wed Thu Fri     multivitamin w/minerals  1 tablet Oral Daily     octreotide  50 mcg Subcutaneous TID     pantoprazole (PROTONIX) IV  40 mg Intravenous Daily with breakfast     disposable pump w/ anesthetic (select flow)   Irrigation  Continuous Nerve Block     senna-docusate  1 tablet Oral BID     sertraline  50 mg Oral Daily     sodium chloride (PF)  3 mL Intracatheter Q8H     tacrolimus  2 mg Oral QPM    And     tacrolimus  3 mg Oral or Feeding Tube QAM     zinc sulfate  220 mg Oral Daily                             OVERNIGHT EVENTS: No acute events overnight. Afebrile. VSS. HD stable.     SUBJECTIVE / INTERVAL HPI: Patient seen and examined at bedside this AM. Unable to obtain ROS as patient AAOx0.     VITAL SIGNS:  Vital Signs Last 24 Hrs  T(C): 37.2 (12 Feb 2020 05:53), Max: 37.2 (12 Feb 2020 05:53)  T(F): 98.9 (12 Feb 2020 05:53), Max: 98.9 (12 Feb 2020 05:53)  HR: 79 (12 Feb 2020 05:53) (77 - 79)  BP: 157/99 (12 Feb 2020 05:53) (129/85 - 157/99)  BP(mean): --  RR: 16 (12 Feb 2020 05:53) (16 - 18)  SpO2: 96% (12 Feb 2020 05:53) (96% - 96%)    PHYSICAL EXAM:    General: middle aged male resting in bed; agitated   HEENT: NC/AT; PERRL, anicteric sclera; unable to examine oral mucosa, pt not cooperating with exam   Neck: supple  Cardiovascular: +S1/S2; RRR  Respiratory: mld wheezing noted bilateral lung feilds  Gastrointestinal: soft, NT/ND; +BSx4; PEG tube in place, c/d/i  Extremities: WWP; no edema, clubbing or cyanosis  Vascular: 2+ radial, DP/PT pulses B/L  Neurological: AAOx0, more alert, actively moving right lower extremity     MEDICATIONS:  MEDICATIONS  (STANDING):  ampicillin/sulbactam  IVPB 3 Gram(s) IV Intermittent every 6 hours  aspirin  chewable 81 milliGRAM(s) Oral daily  atorvastatin 80 milliGRAM(s) Oral at bedtime  clopidogrel Tablet 75 milliGRAM(s) Oral daily  levETIRAcetam  Solution 500 milliGRAM(s) Enteral Tube every 12 hours  modafinil 100 milliGRAM(s) Oral <User Schedule>  nystatin Powder 1 Application(s) Topical two times a day  sodium chloride 0.45%. 1000 milliLiter(s) (80 mL/Hr) IV Continuous <Continuous>    MEDICATIONS  (PRN):  acetaminophen    Suspension .. 650 milliGRAM(s) Oral every 8 hours PRN Temp greater or equal to 38C (100.4F)  melatonin 5 milliGRAM(s) Oral at bedtime PRN Insomnia      ALLERGIES:  Allergies    Allergy Status Unknown    Intolerances        LABS:                        13.1   6.38  )-----------( 186      ( 11 Feb 2020 05:45 )             38.7     02-11    141  |  109<H>  |  14  ----------------------------<  106<H>  3.5   |  22  |  1.13    Ca    8.7      11 Feb 2020 05:45  Phos  2.1     02-11  Mg     2.0     02-11      PT/INR - ( 11 Feb 2020 05:45 )   PT: 13.6 sec;   INR: 1.19          PTT - ( 11 Feb 2020 05:45 )  PTT:26.0 sec    CAPILLARY BLOOD GLUCOSE          RADIOLOGY & ADDITIONAL TESTS: Reviewed.    ASSESSMENT:    PLAN:

## 2020-02-12 NOTE — PROGRESS NOTE ADULT - SUBJECTIVE AND OBJECTIVE BOX
Medicine Consult Note    ADINA MADRID  64y  Male    INTERVAL HPI/OVERNIGHT EVENTS: PEG placed      Review of Systems:  14 system ROS negative except as above      T(C): 37.2 (02-12-20 @ 05:53), Max: 37.2 (02-12-20 @ 05:53)  HR: 79 (02-12-20 @ 05:53) (77 - 79)  BP: 157/99 (02-12-20 @ 05:53) (129/85 - 157/99)  RR: 16 (02-12-20 @ 05:53) (16 - 18)  SpO2: 96% (02-12-20 @ 05:53) (96% - 96%)  Wt(kg): --Vital Signs Last 24 Hrs  T(C): 37.2 (12 Feb 2020 05:53), Max: 37.2 (12 Feb 2020 05:53)  T(F): 98.9 (12 Feb 2020 05:53), Max: 98.9 (12 Feb 2020 05:53)  HR: 79 (12 Feb 2020 05:53) (77 - 79)  BP: 157/99 (12 Feb 2020 05:53) (129/85 - 157/99)  BP(mean): --  RR: 16 (12 Feb 2020 05:53) (16 - 18)  SpO2: 96% (12 Feb 2020 05:53) (96% - 96%)    Constitutional: WDWN M resting comfortably in bed  Head: NC/AT  Eyes: EOMI, anicteric sclera  ENT: no nasal discharge  Neck: supple  Respiratory: Coarse BS b/l, no wheeze. No increased WOB  Cardiac: regular rate, +S1/S2  Gastrointestinal: soft, NT/ND, +PEG  Extremities: WWP  Dermatologic: skin warm, dry  Neurologic: Alert, opens eyes to voice. Does not follow commands. Moving RLE RUE spontaneously. PER, constricted.     Consultant(s) Notes Reviewed:  [x ] YES  [ ] NO  Care Discussed with Consultants/Other Providers [ x] YES  [ ] NO    LABS:                        13.4   10.41 )-----------( 197      ( 12 Feb 2020 06:48 )             40.5     02-12    141  |  110<H>  |  16  ----------------------------<  100<H>  4.0   |  20<L>  |  1.19    Ca    8.9      12 Feb 2020 06:48  Phos  2.1     02-11  Mg     2.1     02-12      PT/INR - ( 11 Feb 2020 05:45 )   PT: 13.6 sec;   INR: 1.19          PTT - ( 11 Feb 2020 05:45 )  PTT:26.0 sec    CAPILLARY BLOOD GLUCOSE                acetaminophen    Suspension .. 650 milliGRAM(s) Oral every 8 hours PRN  albuterol/ipratropium for Nebulization. 3 milliLiter(s) Nebulizer once  ampicillin/sulbactam  IVPB 3 Gram(s) IV Intermittent every 6 hours  aspirin  chewable 81 milliGRAM(s) Oral daily  atorvastatin 80 milliGRAM(s) Oral at bedtime  clopidogrel Tablet 75 milliGRAM(s) Oral daily  levETIRAcetam  Solution 500 milliGRAM(s) Enteral Tube every 12 hours  melatonin 5 milliGRAM(s) Oral at bedtime PRN  modafinil 100 milliGRAM(s) Oral <User Schedule>  nystatin Powder 1 Application(s) Topical two times a day  sodium chloride 0.45%. 1000 milliLiter(s) IV Continuous <Continuous>      RADIOLOGY & ADDITIONAL TESTS reviewed

## 2020-02-13 LAB
ANION GAP SERPL CALC-SCNC: 12 MMOL/L — SIGNIFICANT CHANGE UP (ref 5–17)
BUN SERPL-MCNC: 17 MG/DL — SIGNIFICANT CHANGE UP (ref 7–23)
CALCIUM SERPL-MCNC: 8.8 MG/DL — SIGNIFICANT CHANGE UP (ref 8.4–10.5)
CHLORIDE SERPL-SCNC: 109 MMOL/L — HIGH (ref 96–108)
CO2 SERPL-SCNC: 20 MMOL/L — LOW (ref 22–31)
CREAT SERPL-MCNC: 1.17 MG/DL — SIGNIFICANT CHANGE UP (ref 0.5–1.3)
GLUCOSE SERPL-MCNC: 104 MG/DL — HIGH (ref 70–99)
MAGNESIUM SERPL-MCNC: 2.2 MG/DL — SIGNIFICANT CHANGE UP (ref 1.6–2.6)
POTASSIUM SERPL-MCNC: 3.6 MMOL/L — SIGNIFICANT CHANGE UP (ref 3.5–5.3)
POTASSIUM SERPL-SCNC: 3.6 MMOL/L — SIGNIFICANT CHANGE UP (ref 3.5–5.3)
SODIUM SERPL-SCNC: 141 MMOL/L — SIGNIFICANT CHANGE UP (ref 135–145)

## 2020-02-13 PROCEDURE — 99233 SBSQ HOSP IP/OBS HIGH 50: CPT

## 2020-02-13 RX ORDER — ENOXAPARIN SODIUM 100 MG/ML
40 INJECTION SUBCUTANEOUS EVERY 24 HOURS
Refills: 0 | Status: DISCONTINUED | OUTPATIENT
Start: 2020-02-13 | End: 2020-02-18

## 2020-02-13 RX ORDER — POTASSIUM CHLORIDE 20 MEQ
40 PACKET (EA) ORAL ONCE
Refills: 0 | Status: COMPLETED | OUTPATIENT
Start: 2020-02-13 | End: 2020-02-13

## 2020-02-13 RX ADMIN — ENOXAPARIN SODIUM 40 MILLIGRAM(S): 100 INJECTION SUBCUTANEOUS at 10:07

## 2020-02-13 RX ADMIN — CLOPIDOGREL BISULFATE 75 MILLIGRAM(S): 75 TABLET, FILM COATED ORAL at 11:06

## 2020-02-13 RX ADMIN — LEVETIRACETAM 500 MILLIGRAM(S): 250 TABLET, FILM COATED ORAL at 10:08

## 2020-02-13 RX ADMIN — AMPICILLIN SODIUM AND SULBACTAM SODIUM 200 GRAM(S): 250; 125 INJECTION, POWDER, FOR SUSPENSION INTRAMUSCULAR; INTRAVENOUS at 17:14

## 2020-02-13 RX ADMIN — MODAFINIL 100 MILLIGRAM(S): 200 TABLET ORAL at 11:06

## 2020-02-13 RX ADMIN — Medication 40 MILLIEQUIVALENT(S): at 10:08

## 2020-02-13 RX ADMIN — MODAFINIL 100 MILLIGRAM(S): 200 TABLET ORAL at 07:07

## 2020-02-13 RX ADMIN — AMPICILLIN SODIUM AND SULBACTAM SODIUM 200 GRAM(S): 250; 125 INJECTION, POWDER, FOR SUSPENSION INTRAMUSCULAR; INTRAVENOUS at 11:06

## 2020-02-13 RX ADMIN — Medication 81 MILLIGRAM(S): at 11:06

## 2020-02-13 RX ADMIN — ATORVASTATIN CALCIUM 80 MILLIGRAM(S): 80 TABLET, FILM COATED ORAL at 22:22

## 2020-02-13 RX ADMIN — AMPICILLIN SODIUM AND SULBACTAM SODIUM 200 GRAM(S): 250; 125 INJECTION, POWDER, FOR SUSPENSION INTRAMUSCULAR; INTRAVENOUS at 06:44

## 2020-02-13 RX ADMIN — NYSTATIN CREAM 1 APPLICATION(S): 100000 CREAM TOPICAL at 06:43

## 2020-02-13 RX ADMIN — LEVETIRACETAM 500 MILLIGRAM(S): 250 TABLET, FILM COATED ORAL at 22:21

## 2020-02-13 RX ADMIN — NYSTATIN CREAM 1 APPLICATION(S): 100000 CREAM TOPICAL at 17:14

## 2020-02-13 RX ADMIN — AMPICILLIN SODIUM AND SULBACTAM SODIUM 200 GRAM(S): 250; 125 INJECTION, POWDER, FOR SUSPENSION INTRAMUSCULAR; INTRAVENOUS at 00:24

## 2020-02-13 NOTE — CHART NOTE - NSCHARTNOTEFT_GEN_A_CORE
Admitting Diagnosis:   Patient is a 64y old  Male who presents with a chief complaint of transfer from [facility] for stroke (13 Feb 2020 06:25)      PAST MEDICAL & SURGICAL HISTORY:  Hypertension  CVA (cerebral vascular accident)  No significant past surgical history      Current Nutrition Order:   Diet, NPO with Tube Feed:   Tube Feeding Modality: Gastrostomy  Jevity 1.2 Chuy (JEVITY1.2RTH)  Total Volume for 24 Hours (mL): 1560  Total Number of Cans: 7  Continuous  Starting Tube Feed Rate {mL per Hour}: 10  Increase Tube Feed Rate by (mL): 10     Every hour  Until Goal Tube Feed Rate (mL per Hour): 65  Tube Feed Duration (in Hours): 24  Tube Feed Start Time: 00:00 (02-12-20 @ 15:42)    PO Intake: NPO + EN    GI Issues: No N/V/C/D, +fecal incontinence 2/13, no residuals    Pain: Unable to assess    Skin Integrity: no edema, +surgical incision, no pressure injury    Labs:   02-13    141  |  109<H>  |  17  ----------------------------<  104<H>  3.6   |  20<L>  |  1.17    Ca    8.8      13 Feb 2020 06:56  Mg     2.2     02-13      CAPILLARY BLOOD GLUCOSE          Medications:  MEDICATIONS  (STANDING):  ampicillin/sulbactam  IVPB 3 Gram(s) IV Intermittent every 6 hours  aspirin  chewable 81 milliGRAM(s) Oral daily  atorvastatin 80 milliGRAM(s) Oral at bedtime  clopidogrel Tablet 75 milliGRAM(s) Oral daily  enoxaparin Injectable 40 milliGRAM(s) SubCutaneous every 24 hours  levETIRAcetam  Solution 500 milliGRAM(s) Enteral Tube every 12 hours  modafinil 100 milliGRAM(s) Oral <User Schedule>  nystatin Powder 1 Application(s) Topical two times a day    MEDICATIONS  (PRN):  acetaminophen    Suspension .. 650 milliGRAM(s) Oral every 8 hours PRN Temp greater or equal to 38C (100.4F)  melatonin 5 milliGRAM(s) Oral at bedtime PRN Insomnia      Weight:  82.3kg    Weight Change: No new wts to assess    Nutrition Focused Physical Exam: Completed [   ]  Not Pertinent [ X  ]    Estimated energy needs:   Ht: 5'8" (estimated), Wt: 82.3kg, IBW: 70kg, 117.5%IBW.   *Pt w/ updated height documented as 5'4" (as of 2/4/20). ?Accuracy. Will continue to use ABW (82.3kg) for calculations as pt w/ unclear ht.    Needs calculated based on [facility] standards of care. Needs adjusted for age  20-25kcal/kg= 1646-2057kcal  1.0-1.2gms pro/kg= 82-98gms protein    Subjective:   Pt seen for nutrition follow up. 63 y/o M with PMHx of HTN and CVA (2 months ago) transferred from [facility] s/p stroke (left facial and eyelid drooling and speech slurring presenting for possible thrombectomy, as patient outside window for tPA - c/b reintubation following thrombectomy, now with metabolic encephalopathy 2/2 stroke, S/P PEG 2/11. Palliative following. Pt seen resting in bed, AAOx0, no visitors at bedside. Discussed pt w/ RN. Pt previously on Jevity 1.2 @65ml/hr x 24hrs via NG tube, previously tolerating well, feeds then held and PEG placed, after PEG placed feeds were advanced up to 40ml/hr, however RN expressing concerns for aspiration PNA as pt continues to slink/slide down in bed (RN reports pt w/ hx of aspiration PNA). RN notified MD of concern, team now requesting bolus recs, however bolus feeds likely to put pt at higher risk for aspiration PNA given higher volume (if bolus is to be pursued, strict aspiration precautions needs to be maintained). See below for full recommendations. RD to follow up per protocol.     Previous Nutrition Diagnosis:  Inadequate Energy Intake RT inability to meet needs w/ NPO status AEB consuming 0% EER.    Active [ X ]  Resolved [   ]    Goal: Diet to be advanced as medically feasible, pt to consume >75% EER via tolerated route.     Recommendations:  1) Recommend continue with current EN regimen: Jevity 1.2 via PEG @ goal 65 ml/hr x 24hrs (providing 1560ml TV, 1872kcal, 86gms protein, 1258ml water (23kcal/kg and 1.0gms pro/kg based on ABW of 82.3kg).   >> Monitor s/s intolerance, maintain aspiration precautions at all times.   >> Additional water per MD discretion.  2) Team requesting recs for bolus feeds, however bolus feeds likely to put pt at higher risk for aspiration PNA given higher volume (if bolus is to be pursued, strict aspiration precautions needs to be maintained):  If pt to transition to bolus feeds, recommend: 7 cans Jevity 1.2 via PEG   >> Can have 2 cans for breakfast, 2 cans for lunch, 1 can for snack, 2 cans for dinner. Total provides: 1659 ml TV, 1991 kcal, 92 g protein, 1337ml free H2O; This meets: 24kcal/kg and 1.1 g protein/kg per ABW (82.3 kg)  3) Monitor lytes and replete prn.   4) Bowel regimen per MD discretion.   *Paged team.     Education: N/A; pt lethargic, no visitors present at bedside.     Risk Level: High [ X ] Moderate [   ] Low [   ]. Admitting Diagnosis:   Patient is a 64y old  Male who presents with a chief complaint of transfer from [facility] for stroke (13 Feb 2020 06:25)      PAST MEDICAL & SURGICAL HISTORY:  Hypertension  CVA (cerebral vascular accident)  No significant past surgical history      Current Nutrition Order:   Diet, NPO with Tube Feed:   Tube Feeding Modality: Gastrostomy  Jevity 1.2 Chuy (JEVITY1.2RTH)  Total Volume for 24 Hours (mL): 1560  Total Number of Cans: 7  Continuous  Starting Tube Feed Rate {mL per Hour}: 10  Increase Tube Feed Rate by (mL): 10     Every hour  Until Goal Tube Feed Rate (mL per Hour): 65  Tube Feed Duration (in Hours): 24  Tube Feed Start Time: 00:00 (02-12-20 @ 15:42)    PO Intake: NPO + EN    GI Issues: No N/V/C/D, +fecal incontinence 2/13, no residuals    Pain: Unable to assess    Skin Integrity: no edema, +surgical incision, no pressure injury    Labs:   02-13    141  |  109<H>  |  17  ----------------------------<  104<H>  3.6   |  20<L>  |  1.17    Ca    8.8      13 Feb 2020 06:56  Mg     2.2     02-13      CAPILLARY BLOOD GLUCOSE          Medications:  MEDICATIONS  (STANDING):  ampicillin/sulbactam  IVPB 3 Gram(s) IV Intermittent every 6 hours  aspirin  chewable 81 milliGRAM(s) Oral daily  atorvastatin 80 milliGRAM(s) Oral at bedtime  clopidogrel Tablet 75 milliGRAM(s) Oral daily  enoxaparin Injectable 40 milliGRAM(s) SubCutaneous every 24 hours  levETIRAcetam  Solution 500 milliGRAM(s) Enteral Tube every 12 hours  modafinil 100 milliGRAM(s) Oral <User Schedule>  nystatin Powder 1 Application(s) Topical two times a day    MEDICATIONS  (PRN):  acetaminophen    Suspension .. 650 milliGRAM(s) Oral every 8 hours PRN Temp greater or equal to 38C (100.4F)  melatonin 5 milliGRAM(s) Oral at bedtime PRN Insomnia      Weight:  82.3kg    Weight Change: No new wts to assess    Nutrition Focused Physical Exam: Completed [   ]  Not Pertinent [ X  ]    Estimated energy needs:   Ht: 5'8" (estimated), Wt: 82.3kg, IBW: 70kg, 117.5%IBW.   *Pt w/ updated height documented as 5'4" (as of 2/4/20). ?Accuracy. Will continue to use ABW (82.3kg) for calculations as pt w/ unclear ht.    Needs calculated based on [facility] standards of care. Needs adjusted for age  20-25kcal/kg= 1646-2057kcal  1.0-1.2gms pro/kg= 82-98gms protein    Subjective:   Pt seen for nutrition follow up. 63 y/o M with PMHx of HTN and CVA (2 months ago) transferred from [facility] s/p stroke (left facial and eyelid drooling and speech slurring presenting for possible thrombectomy, as patient outside window for tPA - c/b reintubation following thrombectomy, now with metabolic encephalopathy 2/2 stroke, S/P PEG 2/11. Palliative following. Pt seen resting in bed, AAOx0, no visitors at bedside. Discussed pt w/ RN. Pt previously on Jevity 1.2 @65ml/hr x 24hrs via NG tube, previously tolerating well, feeds then held and PEG placed, after PEG placed feeds were advanced up to 40ml/hr, however RN expressing concerns for aspiration PNA as pt continues to slink/slide down in bed (RN reports pt w/ hx of aspiration PNA). RN notified MD of concern, team now requesting bolus recs, however bolus feeds likely to put pt at higher risk for aspiration PNA given higher volume (if bolus is to be pursued, strict aspiration precautions needs to be maintained). See below for full recommendations. RD to follow up per protocol.     Previous Nutrition Diagnosis:  Inadequate Energy Intake RT inability to meet needs w/ NPO status AEB consuming 0% EER.    Active [ X ]  Resolved [   ]    Goal: Diet to be advanced as medically feasible, pt to consume >75% EER via tolerated route.     Recommendations:  1) Recommend continue with current EN regimen: Jevity 1.2 via PEG @ goal 65 ml/hr x 24hrs (providing 1560ml TV, 1872kcal, 86gms protein, 1258ml water (23kcal/kg and 1.0gms pro/kg based on ABW of 82.3kg).   >> Monitor s/s intolerance, maintain aspiration precautions at all times.   >> Additional water per MD discretion.  2) Team requesting recs for bolus feeds, however bolus feeds likely to put pt at higher risk for aspiration PNA given higher volume (if bolus is to be pursued, strict aspiration precautions needs to be maintained)(sent order pending verification per MD request):  If pt to transition to bolus feeds, recommend: 7 cans Jevity 1.2 via PEG   >> Can have 2 cans for breakfast, 2 cans for lunch, 1 can for snack, 2 cans for dinner. Total provides: 1659 ml TV, 1991 kcal, 92 g protein, 1337ml free H2O; This meets: 24kcal/kg and 1.1 g protein/kg per ABW (82.3 kg)  3) Monitor lytes and replete prn.   4) Bowel regimen per MD discretion.   *d/w team.     Education: N/A; pt lethargic, no visitors present at bedside.     Risk Level: High [ X ] Moderate [   ] Low [   ].

## 2020-02-13 NOTE — PROGRESS NOTE ADULT - ASSESSMENT
65 y/o M with PMHx of HTN and CVA (2 months ago) transferred from [facility] s/p stroke (left facial and eyelid drooling and speech slurring presenting for possible thrombectomy, as patient outside window for tPA - c/b reintubation following thrombectomy, now with metabolic encephalopathy 2/2 stroke, pending PEG placement. 65 y/o M with PMHx of HTN and CVA (2 months ago) transferred from [facility] s/p stroke (left facial and eyelid drooling and speech slurring presenting for possible thrombectomy, as patient outside window for tPA - c/b reintubation following thrombectomy, now with metabolic encephalopathy 2/2 stroke, s/p PEG placement.

## 2020-02-13 NOTE — PROGRESS NOTE ADULT - PROBLEM SELECTOR PLAN 5
Patient Na beginning to trend down  - C/w with tube feeds with Jevity 1.2 at 65cc/hr, feeds restarted 2/12  - Free water flushes 250 cc q 4 hours Patient Na beginning to trend down  - C/w with tube feeds with Jevity 1.2 tube feeds restarted 2/12  - Free water flushes 250 cc q 4 hours

## 2020-02-13 NOTE — PROGRESS NOTE ADULT - PROBLEM SELECTOR PLAN 8
Patient AAOx0, unable to make decisions. Next of kin is son in [facility]. Brother would like to take brother to [place] to pursue rehab.   - s/p PEG  - Family would like patient in [facility], pending placement Patient AAOx0, unable to make decisions. Next of kin is son in [facility]. Brother would like to take brother to [place] to pursue rehab.   - s/p PEG with tube feeds  - Family would like patient in [facility], pending placement

## 2020-02-13 NOTE — PROGRESS NOTE ADULT - SUBJECTIVE AND OBJECTIVE BOX
**Incomplete Note**    OVERNIGHT EVENTS:    SUBJECTIVE / INTERVAL HPI: Patient seen and examined at bedside.     VITAL SIGNS:  Vital Signs Last 24 Hrs  T(C): 36.6 (13 Feb 2020 05:18), Max: 36.8 (12 Feb 2020 11:58)  T(F): 97.8 (13 Feb 2020 05:18), Max: 98.3 (12 Feb 2020 11:58)  HR: 76 (13 Feb 2020 05:18) (76 - 77)  BP: 136/81 (13 Feb 2020 05:18) (136/81 - 156/90)  BP(mean): --  RR: 16 (13 Feb 2020 05:18) (16 - 16)  SpO2: 96% (13 Feb 2020 05:18) (96% - 97%)    PHYSICAL EXAM:    General: WDWN  HEENT: NC/AT; PERRL, anicteric sclera; MMM  Neck: supple  Cardiovascular: +S1/S2; RRR  Respiratory: CTA B/L; no W/R/R  Gastrointestinal: soft, NT/ND; +BSx4  Extremities: WWP; no edema, clubbing or cyanosis  Vascular: 2+ radial, DP/PT pulses B/L  Neurological: AAOx3; no focal deficits    MEDICATIONS:  MEDICATIONS  (STANDING):  ampicillin/sulbactam  IVPB 3 Gram(s) IV Intermittent every 6 hours  aspirin  chewable 81 milliGRAM(s) Oral daily  atorvastatin 80 milliGRAM(s) Oral at bedtime  clopidogrel Tablet 75 milliGRAM(s) Oral daily  levETIRAcetam  Solution 500 milliGRAM(s) Enteral Tube every 12 hours  modafinil 100 milliGRAM(s) Oral <User Schedule>  nystatin Powder 1 Application(s) Topical two times a day  sodium chloride 0.45%. 1000 milliLiter(s) (80 mL/Hr) IV Continuous <Continuous>    MEDICATIONS  (PRN):  acetaminophen    Suspension .. 650 milliGRAM(s) Oral every 8 hours PRN Temp greater or equal to 38C (100.4F)  melatonin 5 milliGRAM(s) Oral at bedtime PRN Insomnia      ALLERGIES:  Allergies    Allergy Status Unknown    Intolerances        LABS:                        13.4   10.41 )-----------( 197      ( 12 Feb 2020 06:48 )             40.5     02-12    141  |  110<H>  |  16  ----------------------------<  100<H>  4.0   |  20<L>  |  1.19    Ca    8.9      12 Feb 2020 06:48  Mg     2.1     02-12          CAPILLARY BLOOD GLUCOSE          RADIOLOGY & ADDITIONAL TESTS: Reviewed.    ASSESSMENT:    PLAN: OVERNIGHT EVENTS: NAEO    SUBJECTIVE / INTERVAL HPI: Patient seen and examined at bedside.     VITAL SIGNS:  Vital Signs Last 24 Hrs  T(C): 36.6 (13 Feb 2020 05:18), Max: 36.8 (12 Feb 2020 11:58)  T(F): 97.8 (13 Feb 2020 05:18), Max: 98.3 (12 Feb 2020 11:58)  HR: 76 (13 Feb 2020 05:18) (76 - 77)  BP: 136/81 (13 Feb 2020 05:18) (136/81 - 156/90)  BP(mean): --  RR: 16 (13 Feb 2020 05:18) (16 - 16)  SpO2: 96% (13 Feb 2020 05:18) (96% - 97%)    PHYSICAL EXAM:    General: WDWN  HEENT: NC/AT; PERRL, anicteric sclera; MMM  Neck: supple  Cardiovascular: +S1/S2; RRR  Respiratory: CTA B/L; no W/R/R  Gastrointestinal: soft, NT/ND; +BSx4  Extremities: WWP; no edema, clubbing or cyanosis  Vascular: 2+ radial, DP/PT pulses B/L  Neurological: AAOx3; no focal deficits    MEDICATIONS:  MEDICATIONS  (STANDING):  ampicillin/sulbactam  IVPB 3 Gram(s) IV Intermittent every 6 hours  aspirin  chewable 81 milliGRAM(s) Oral daily  atorvastatin 80 milliGRAM(s) Oral at bedtime  clopidogrel Tablet 75 milliGRAM(s) Oral daily  levETIRAcetam  Solution 500 milliGRAM(s) Enteral Tube every 12 hours  modafinil 100 milliGRAM(s) Oral <User Schedule>  nystatin Powder 1 Application(s) Topical two times a day  sodium chloride 0.45%. 1000 milliLiter(s) (80 mL/Hr) IV Continuous <Continuous>    MEDICATIONS  (PRN):  acetaminophen    Suspension .. 650 milliGRAM(s) Oral every 8 hours PRN Temp greater or equal to 38C (100.4F)  melatonin 5 milliGRAM(s) Oral at bedtime PRN Insomnia      ALLERGIES:  Allergies    Allergy Status Unknown    Intolerances        LABS:                        13.4   10.41 )-----------( 197      ( 12 Feb 2020 06:48 )             40.5     02-12    141  |  110<H>  |  16  ----------------------------<  100<H>  4.0   |  20<L>  |  1.19    Ca    8.9      12 Feb 2020 06:48  Mg     2.1     02-12          CAPILLARY BLOOD GLUCOSE          RADIOLOGY & ADDITIONAL TESTS: Reviewed.    ASSESSMENT:    PLAN: OVERNIGHT EVENTS: NAEO    SUBJECTIVE / INTERVAL HPI: Patient seen and examined at bedside. Unable to obtain ROS.     VITAL SIGNS:  Vital Signs Last 24 Hrs  T(C): 36.6 (13 Feb 2020 05:18), Max: 36.8 (12 Feb 2020 11:58)  T(F): 97.8 (13 Feb 2020 05:18), Max: 98.3 (12 Feb 2020 11:58)  HR: 76 (13 Feb 2020 05:18) (76 - 77)  BP: 136/81 (13 Feb 2020 05:18) (136/81 - 156/90)  BP(mean): --  RR: 16 (13 Feb 2020 05:18) (16 - 16)  SpO2: 96% (13 Feb 2020 05:18) (96% - 97%)    PHYSICAL EXAM:    General: WDWN  HEENT: NC/AT; PERRL, anicteric sclera; MMM  Neck: supple  Cardiovascular: +S1/S2; RRR  Respiratory: CTA B/L; no W/R/R  Gastrointestinal: soft, NT/ND; +BSx4  Extremities: WWP; no edema, clubbing or cyanosis  Vascular: 2+ radial, DP/PT pulses B/L  Neurological: AAOx0; no focal deficits    MEDICATIONS:  MEDICATIONS  (STANDING):  ampicillin/sulbactam  IVPB 3 Gram(s) IV Intermittent every 6 hours  aspirin  chewable 81 milliGRAM(s) Oral daily  atorvastatin 80 milliGRAM(s) Oral at bedtime  clopidogrel Tablet 75 milliGRAM(s) Oral daily  levETIRAcetam  Solution 500 milliGRAM(s) Enteral Tube every 12 hours  modafinil 100 milliGRAM(s) Oral <User Schedule>  nystatin Powder 1 Application(s) Topical two times a day  sodium chloride 0.45%. 1000 milliLiter(s) (80 mL/Hr) IV Continuous <Continuous>    MEDICATIONS  (PRN):  acetaminophen    Suspension .. 650 milliGRAM(s) Oral every 8 hours PRN Temp greater or equal to 38C (100.4F)  melatonin 5 milliGRAM(s) Oral at bedtime PRN Insomnia      ALLERGIES:  Allergies    Allergy Status Unknown    Intolerances        LABS:                        13.4   10.41 )-----------( 197      ( 12 Feb 2020 06:48 )             40.5     02-12    141  |  110<H>  |  16  ----------------------------<  100<H>  4.0   |  20<L>  |  1.19    Ca    8.9      12 Feb 2020 06:48  Mg     2.1     02-12          CAPILLARY BLOOD GLUCOSE          RADIOLOGY & ADDITIONAL TESTS: Reviewed.    ASSESSMENT:    PLAN:

## 2020-02-13 NOTE — PROGRESS NOTE ADULT - SUBJECTIVE AND OBJECTIVE BOX
Physical Medicine and Rehabilitation Progress Note:    Patient is a 64y old  Male who presents with a chief complaint of transfer from [facility] for stroke (13 Feb 2020 06:25)      HPI:  History obtained from [facility] medical record, as patient altered and subsequently intubated.     65 y/o male with PMH hypertension CVA (2 months ago at [facility]), dementia and depression transferred from [facility] with concern for stroke (MCA). Patient presented to [facility] after presenting to pharmacy with shirt and shoes on backwards and episode of falling on floor and speech slurring in pharmacy, found to have left sided facial drooping of eyelids, slurred speech, and expressive aphasia on presentation to [facility]. Last known normal unknown. Baseline mental status AAOx3 able to follow commands, completes ADLs independently.     Patient outside of window for tPA, transferred to [facility] for possible thrombectomy. At [facility], stroke code called on admission - imaging negative for acute infarct. Thrombectomy of R MCA attempted, however aborted as noted to have stump occlusion of mid right M1 with extensive JOSEPHINE collaterals - likely chronic occulusion. Patient extubated and upon presentation to MICU, patient noted to be altered with tongue rolling back, with contraction of left upper and lower extremity - patient intubated for airway protection. Second stroke code called, with repeat imaging without evidence of acute infarct. Patients admitted to MICU, intubated pending vEEG.      [facility]: Vitals: T: Afebrile | HR: 91-93 | BP: 147-159/ (MAP: ) | RR: 20-22 |   Labs: BMP: WNL | UA: WNL | BAL <10 | Lipid panel: ; Cholesterol 176 |   Interventions: CTH: No acute intracranial abnormality; chronic microvascular ischemic and volume changes - old posterior right frontal cortical infarct noted.   CT Perfusion Brain w/ contrast: Right MCA abnormal flow pattern consistent with ischemic change; no discrete evidence of infarct.     [facility]: Vitals: T: Afebrile | HR: 90 | BP: 208/123 | RR: 14  Labs: WBC 6.88; Hgb 13; Plt 144; Cr 1.21; Glucose 104  CT Brain Stroke (on arrival): Gyriform sites of cortical hyperdensity, presumed contrast enhancement, are favored to be subacute sites of infarction, superimposed on more chronic sites of right MCA infarction.  CT Brain Stroke (2nd stroke code, following aborted thrombectomy): Compared to prior study from earlier in the day, no significant change in scattered enhancement of the right frontal and posterior temporal lobes, likely secondary to subacute infarction  CT Perfusion: Abnormal perfusion with RAPID calculated mismatch volume of 44 ml and mismatch ratio is infinite within the right MCA territory.  CT Angio Head: Probable high-grade stenosis of the right M1 segment rather than occlusion. Multifocal areas of narrowing involving the right A1 and left M1 segment which may be secondary to intracranial atherosclerosis.  CT Angio Neck: Normal CTA neck.     Interventions: Intubated and sedated s/p thrombectomy, vEEG placed (31 Jan 2020 19:25)                            13.4   10.41 )-----------( 197      ( 12 Feb 2020 06:48 )             40.5       02-13    141  |  109<H>  |  17  ----------------------------<  104<H>  3.6   |  20<L>  |  1.17    Ca    8.8      13 Feb 2020 06:56  Mg     2.2     02-13      Vital Signs Last 24 Hrs  T(C): 36.6 (13 Feb 2020 11:18), Max: 36.6 (12 Feb 2020 22:15)  T(F): 97.9 (13 Feb 2020 11:18), Max: 97.9 (12 Feb 2020 22:15)  HR: 75 (13 Feb 2020 11:18) (75 - 77)  BP: 155/80 (13 Feb 2020 11:18) (136/81 - 156/90)  BP(mean): --  RR: 18 (13 Feb 2020 11:18) (16 - 18)  SpO2: 99% (13 Feb 2020 11:18) (96% - 99%)    MEDICATIONS  (STANDING):  ampicillin/sulbactam  IVPB 3 Gram(s) IV Intermittent every 6 hours  aspirin  chewable 81 milliGRAM(s) Oral daily  atorvastatin 80 milliGRAM(s) Oral at bedtime  clopidogrel Tablet 75 milliGRAM(s) Oral daily  enoxaparin Injectable 40 milliGRAM(s) SubCutaneous every 24 hours  levETIRAcetam  Solution 500 milliGRAM(s) Enteral Tube every 12 hours  modafinil 100 milliGRAM(s) Oral <User Schedule>  nystatin Powder 1 Application(s) Topical two times a day    MEDICATIONS  (PRN):  acetaminophen    Suspension .. 650 milliGRAM(s) Oral every 8 hours PRN Temp greater or equal to 38C (100.4F)  melatonin 5 milliGRAM(s) Oral at bedtime PRN Insomnia    Currently Undergoing Physical Therapy at bedside.    Functional Status Assessment: 2/12/2020    Cognitive/Perceptual/Neuro  Cognitive/Neuro/Behavioral [WDL Definition: Alert; opens eyes spontaneously; arouses to voice or touch; oriented x 4; follows commands; speech spontaneous, logical; purposeful motor response; behavior appropriate to situation]: WDL except  Level of Consciousness: lethargic;  lethargic t/o, unresponsive to verbal cues, responsive to noxious stimulaton  Arousal Level: arouses to vigorous stimulation  Orientation: disoriented x 4  Speech: unable to speak    Language Assistance  Preferred Language to Address Healthcare Preferred Language to Address Healthcare: Haitian    Therapeutic Interventions      Bed Mobility  Bed Mobility Training Rolling/Turning: dependent (less than 25% patient effort);  2 person assist;  verbal cues;  nonverbal cues (demo/gestures)  Bed Mobility Training Scooting: dependent (less than 25% patient effort);  2 person assist;  verbal cues;  nonverbal cues (demo/gestures)  Bed Mobility Training Sit-to-Supine: dependent (less than 25% patient effort);  2 person assist;  verbal cues;  nonverbal cues (demo/gestures)  Bed Mobility Training Supine-to-Sit: dependent (less than 25% patient effort);  2 person assist;  verbal cues;  nonverbal cues (demo/gestures)  Bed Mobility Training Limitations: decreased ability to use arms for pushing/pulling;  abnormal muscle tone;  decreased strength;  impaired balance;  impaired postural control;  cognitive, decreased safety awareness    Sit-Stand Transfer Training  Transfer Training Sit-to-Stand Transfer: unable to perform;  due to poor sitting balance     Therapeutic Exercise  Therapeutic Exercise Detail: Pt dangled at EOB for  12 mins with dependent x 1 more alert while dangling at EOB, unable to follow simple commands t/o session.Seated ther ex: LAQ, hip flexion, ankle pump, elbow flexion/extension, shoulder flexion/extension x 15 bilaterally PROM. PT noticed involuntary movement with RUE intermittently during PT session.+1 finger width subluxation on L shoulder, +withdrawal on LLE, no withdrawal on RLE and b/l UEs.             PM&R Impression: as above    Current Disposition Plan Recommendations: subacute rehab placement

## 2020-02-13 NOTE — PROGRESS NOTE ADULT - PROBLEM SELECTOR PLAN 2
History of CVA, Per medical records patient with CVA, 2 months ago. 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    - c/w ASA 81mg PO daily and Plavix 75mg PO daily    - c/w Atorvastatin 80mg PO daily  - LOC to be performed outpatient

## 2020-02-13 NOTE — PROGRESS NOTE ADULT - PROBLEM SELECTOR PLAN 9
F: N/A  E: Replete K < 4.0, and Mg <2.0   N: Jevity 1.2 @ goal 65cc/hr  SCD: Lovenox F: N/A  E: Replete K < 4.0, and Mg <2.0   N: Jevity 1.2   SCD: Lovenox

## 2020-02-13 NOTE — PROGRESS NOTE ADULT - PROBLEM SELECTOR PLAN 7
#Overflow incontinence: RESOLVED  Patient with increase urinary output, concern for overflow incontinence. Lacy removed 2/12 -> passed TOV  - c/w Doxazosin 1mg 2mg qd at bedtime

## 2020-02-13 NOTE — PROGRESS NOTE ADULT - PROBLEM SELECTOR PLAN 3
Patient febrile to 100.4 2/9. CXR clear, UA without evidence of infection. Blood cultures NGTD. UA neg.  - vanc/zosyn d/c, patient likely aspirated, switch to Unasyn 3g q6hr (7 days total 2/10-2/16)  - blood cultures NGTD   - aspiration precautions

## 2020-02-13 NOTE — PROGRESS NOTE ADULT - ASSESSMENT
per Neurology    65 y/o M with PMHx of HTN and CVA (2 months ago) transferred from [facility] s/p stroke (left facial and eyelid drooling and speech slurring presenting for possible thrombectomy, as patient outside window for tPA - c/b reintubation following thrombectomy, now with metabolic encephalopathy 2/2 stroke, pending PEG placement.         Problem/Plan - 1:  ·  Problem: Metabolic encephalopathy.  Plan: likely 2/2 R MCA infarct affecting the basal ganglia, frontal, and temporal lobes  -management per neuro  - c/w Keppra 500mg BID  - minimize ativan and sedation  - PT recommending of acute rehab facility vs [facility]    #Depression   Per family, patient with history of depression. Previously on sertraline, now on modafenil 100mg BID.    - home trazadone 100mg held (pt non-arousable)  - holding home meds in setting of lethargy.     Problem/Plan - 2:  ·  Problem: Cerebrovascular accident (CVA), unspecified mechanism.  Plan: History of CVA, Per medical records patient with CVA, 2 months ago. 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    - c/w ASA 81mg PO daily and Plavix 75mg PO daily    - c/w Atorvastatin 80mg PO daily  - LOC to be performed outpatient.     Problem/Plan - 3:  ·  Problem: Aspiration pneumonia.  Plan: Patient febrile to 100.4 2/9. CXR clear, UA without evidence of infection. Blood cultures NGTD. UA neg.  - vanc/zosyn d/c, patient likely aspirated, switch to Unasyn 3g q6hr (7 days total 2/10-2/16)  - blood cultures NGTD   - aspiration precautions.     Problem/Plan - 4:  ·  Problem: Hypertension, unspecified type.  Plan: now controlled on enalapril, amlodipine, coreg  - may give labatelol 10mg IV push or Lasix 20mg IV push if episodes of HTN   - SBP goal <180.     Problem/Plan - 5:  ·  Problem: Hypernatremia.  Plan: Patient Na beginning to trend down  - C/w with tube feeds with Jevity 1.2 at 65cc/hr, feeds restarted 2/12  - Free water flushes 250 cc q 4 hours.     Problem/Plan - 6:  Problem: PAULY (acute kidney injury). Plan: now improving, likely prerenal due to decreased PO intake  - free water flushes 250 q5hr   - Trend BMP   - Avoid nephrotoxic medications.    Problem/Plan - 7:  ·  Problem: Overflow incontinence.  Plan: #Overflow incontinence: RESOLVED  Patient with increase urinary output, concern for overflow incontinence. Lacy removed 2/12 -> passed TOV  - c/w Doxazosin 1mg 2mg qd at bedtime.     Problem/Plan - 8:  ·  Problem: Goals of care, counseling/discussion.  Plan: Patient AAOx0, unable to make decisions. Next of kin is son in [facility]. Brother would like to take brother to [place] to pursue rehab.   - s/p PEG  - Family would like patient in [facility], pending placement.     Problem/Plan - 9:  ·  Problem: Nutrition, metabolism, and development symptoms.  Plan: F: N/A  E: Replete K < 4.0, and Mg <2.0   N: Jevity 1.2 @ goal 65cc/hr  SCD: Lovenox.

## 2020-02-14 ENCOUNTER — TRANSCRIPTION ENCOUNTER (OUTPATIENT)
Age: 65
End: 2020-02-14

## 2020-02-14 LAB
CULTURE RESULTS: SIGNIFICANT CHANGE UP
SPECIMEN SOURCE: SIGNIFICANT CHANGE UP

## 2020-02-14 PROCEDURE — 99231 SBSQ HOSP IP/OBS SF/LOW 25: CPT

## 2020-02-14 RX ORDER — ACETAMINOPHEN 500 MG
20.31 TABLET ORAL
Qty: 0 | Refills: 0 | DISCHARGE
Start: 2020-02-14

## 2020-02-14 RX ORDER — ASPIRIN/CALCIUM CARB/MAGNESIUM 324 MG
1 TABLET ORAL
Qty: 0 | Refills: 0 | DISCHARGE
Start: 2020-02-14

## 2020-02-14 RX ORDER — ATORVASTATIN CALCIUM 80 MG/1
1 TABLET, FILM COATED ORAL
Qty: 0 | Refills: 0 | DISCHARGE
Start: 2020-02-14

## 2020-02-14 RX ORDER — LEVETIRACETAM 250 MG/1
5 TABLET, FILM COATED ORAL
Qty: 0 | Refills: 0 | DISCHARGE
Start: 2020-02-14

## 2020-02-14 RX ORDER — NYSTATIN CREAM 100000 [USP'U]/G
1 CREAM TOPICAL
Qty: 0 | Refills: 0 | DISCHARGE
Start: 2020-02-14

## 2020-02-14 RX ORDER — AMLODIPINE BESYLATE 2.5 MG/1
5 TABLET ORAL DAILY
Refills: 0 | Status: DISCONTINUED | OUTPATIENT
Start: 2020-02-14 | End: 2020-02-17

## 2020-02-14 RX ORDER — MODAFINIL 200 MG/1
1 TABLET ORAL
Qty: 0 | Refills: 0 | DISCHARGE
Start: 2020-02-14

## 2020-02-14 RX ORDER — MODAFINIL 200 MG/1
100 TABLET ORAL
Refills: 0 | Status: DISCONTINUED | OUTPATIENT
Start: 2020-02-15 | End: 2020-02-18

## 2020-02-14 RX ORDER — LANOLIN ALCOHOL/MO/W.PET/CERES
1 CREAM (GRAM) TOPICAL
Qty: 0 | Refills: 0 | DISCHARGE
Start: 2020-02-14

## 2020-02-14 RX ORDER — CLOPIDOGREL BISULFATE 75 MG/1
1 TABLET, FILM COATED ORAL
Qty: 0 | Refills: 0 | DISCHARGE
Start: 2020-02-14

## 2020-02-14 RX ADMIN — AMPICILLIN SODIUM AND SULBACTAM SODIUM 200 GRAM(S): 250; 125 INJECTION, POWDER, FOR SUSPENSION INTRAMUSCULAR; INTRAVENOUS at 06:39

## 2020-02-14 RX ADMIN — LEVETIRACETAM 500 MILLIGRAM(S): 250 TABLET, FILM COATED ORAL at 09:45

## 2020-02-14 RX ADMIN — ENOXAPARIN SODIUM 40 MILLIGRAM(S): 100 INJECTION SUBCUTANEOUS at 09:45

## 2020-02-14 RX ADMIN — AMPICILLIN SODIUM AND SULBACTAM SODIUM 200 GRAM(S): 250; 125 INJECTION, POWDER, FOR SUSPENSION INTRAMUSCULAR; INTRAVENOUS at 23:57

## 2020-02-14 RX ADMIN — Medication 5 MILLIGRAM(S): at 02:53

## 2020-02-14 RX ADMIN — CLOPIDOGREL BISULFATE 75 MILLIGRAM(S): 75 TABLET, FILM COATED ORAL at 11:22

## 2020-02-14 RX ADMIN — ATORVASTATIN CALCIUM 80 MILLIGRAM(S): 80 TABLET, FILM COATED ORAL at 22:21

## 2020-02-14 RX ADMIN — NYSTATIN CREAM 1 APPLICATION(S): 100000 CREAM TOPICAL at 06:39

## 2020-02-14 RX ADMIN — MODAFINIL 100 MILLIGRAM(S): 200 TABLET ORAL at 11:22

## 2020-02-14 RX ADMIN — AMPICILLIN SODIUM AND SULBACTAM SODIUM 200 GRAM(S): 250; 125 INJECTION, POWDER, FOR SUSPENSION INTRAMUSCULAR; INTRAVENOUS at 16:46

## 2020-02-14 RX ADMIN — NYSTATIN CREAM 1 APPLICATION(S): 100000 CREAM TOPICAL at 16:46

## 2020-02-14 RX ADMIN — AMLODIPINE BESYLATE 5 MILLIGRAM(S): 2.5 TABLET ORAL at 16:46

## 2020-02-14 RX ADMIN — MODAFINIL 100 MILLIGRAM(S): 200 TABLET ORAL at 07:15

## 2020-02-14 RX ADMIN — AMPICILLIN SODIUM AND SULBACTAM SODIUM 200 GRAM(S): 250; 125 INJECTION, POWDER, FOR SUSPENSION INTRAMUSCULAR; INTRAVENOUS at 11:21

## 2020-02-14 RX ADMIN — LEVETIRACETAM 500 MILLIGRAM(S): 250 TABLET, FILM COATED ORAL at 22:21

## 2020-02-14 RX ADMIN — AMPICILLIN SODIUM AND SULBACTAM SODIUM 200 GRAM(S): 250; 125 INJECTION, POWDER, FOR SUSPENSION INTRAMUSCULAR; INTRAVENOUS at 00:52

## 2020-02-14 RX ADMIN — Medication 81 MILLIGRAM(S): at 11:22

## 2020-02-14 NOTE — PROGRESS NOTE ADULT - PROBLEM SELECTOR PLAN 3
Patient febrile to 100.4 2/9. CXR clear, UA without evidence of infection. Blood cultures NGTD. UA neg.  - vanc/zosyn d/c, patient likely aspirated, switch to Unasyn 3g q6hr (7 days total 2/10-2/16)  - blood cultures NGTD   - aspiration precautions Patient febrile to 100.4 2/9. CXR clear, UA without evidence of infection. Blood cultures NGTD. UA neg.  - vanc/zosyn d/c, patient likely aspirated, switch to Unasyn 3g q6hr (7 days total 2/10-2/16) -> if d/c will discharge on augmentin   - blood cultures NGTD   - aspiration precautions  - bolus feeds in setting of aspiration

## 2020-02-14 NOTE — PROGRESS NOTE ADULT - PROBLEM SELECTOR PLAN 6
now improving, likely prerenal due to decreased PO intake  - free water flushes 250 q5hr   - Trend BMP   - Avoid nephrotoxic medications RESOLVED  now improving, likely prerenal due to decreased PO intake  - free water flushes 250 q5hr   - Trend BMP   - Avoid nephrotoxic medications

## 2020-02-14 NOTE — PROGRESS NOTE ADULT - PROBLEM SELECTOR PLAN 4
now controlled on enalapril, amlodipine, coreg  - may give labatelol 10mg IV push or Lasix 20mg IV push if episodes of HTN   - SBP goal <180 Patient with hypertensive emergency on admission requiring cardene gtt. Patient now normotensive.   - Amlodopine 5mg restared following PEG placement   - will add enalapril, and coreg started this admission as needed    - SBP goal <180

## 2020-02-14 NOTE — DISCHARGE NOTE PROVIDER - NSDCFUADDINST_GEN_ALL_CORE_FT
Please follow up with your primary care provider within 2 weeks of this hospitalization. Please call his office to make an appointment.    Please bring all discharge paper work to all follow up appointments.

## 2020-02-14 NOTE — DISCHARGE NOTE PROVIDER - HOSPITAL COURSE
Patient is __ yo M/F with past medical history of _____    Presented with _____, found to have _____        Problem List/Main Diagnoses (system-based):         Inpatient treatment course:         New medications:         Labs to be followed outpatient: N/A        Imaging to be followed up outpatient: LOC         Exam to be followed outpatient: Comprehensive physical exam         Dispo: [facility] 65 y/o M with PMHx of HTN and CVA (2 months ago) transferred from [facility] s/p stroke (left facial and eyelid drooling and speech slurring presenting for possible thrombectomy, as patient outside window for tPA - c/b reintubation following thrombectomy, now with metabolic encephalopathy 2/2 stroke, s/p PEG placement.         Problem List/Main Diagnoses (system-based):         Inpatient treatment course: 64M with PMH of dementia, HTN and CVA (2 months ago) transferred from [facility] s/p possible stroke (left facial and eyelid drooling and speech slurring). Patient was transferred to [facility] for thrombectomy as patient outside window of tPA. CTH/brain and perfusion imaging negative for stroke on admission. Patient had posterior collaterals on angiogram, making chronic ischemia a more likely etiology of presentation, so thrombectomy aborted. However, patient emergently intubation following thrombectomy for airway protection and concern for possible stroke in setting of new left extremity tremor. Repeat stroke workup negative, however with elevated concern for seizure. Patient Keppra loaded, with initiation of Keppra 500mg BID. vEEG without evidence of seizure activity, however, will continue Keppra given new left sided tremor. Patient successfully extubated, however, non-arousable, somnolent, and lethargic, encephalopathic. MRI brain demonstrating stroke of right MCA involving the basal ganglia, frontal lobe and temporal lobe with resultant left sided weakness and metabolic encephalopathy. Hospital course complicated by persistent hypertension, initially requiring nicardipine gtt. Home amlodipine 5mg increased to 10mg, with initiation of enalapril and coreg for optimization of blood pressures. Patient at risk for aspiration, now s/p PEG tube placement, discharged to [facility]         New medications: Norvasc 5mg, Coreg 12.5,  enalapril         *** If patient agitated, [facility] can consider starting Seroquel 12.5mg BID ***        Labs to be followed outpatient: N/A        Imaging to be followed up outpatient: LOC         Exam to be followed outpatient: Comprehensive physical exam         Dispo: [facility] 65 y/o M with PMHx of HTN and CVA (2 months ago) transferred from [facility] s/p stroke (left facial and eyelid drooling and speech slurring presenting for possible thrombectomy, as patient outside window for tPA - c/b reintubation following thrombectomy, now with metabolic encephalopathy 2/2 stroke, s/p PEG placement.         Problem List/Main Diagnoses (system-based):         Inpatient treatment course: 64M with PMH of dementia, HTN and CVA (2 months ago) transferred from [facility] s/p possible stroke (left facial and eyelid drooling and speech slurring). Patient was transferred to [facility] for thrombectomy as patient outside window of tPA. CTH/brain and perfusion imaging negative for stroke on admission. Patient had posterior collaterals on angiogram, making chronic ischemia a more likely etiology of presentation, so thrombectomy aborted. However, patient emergently intubation following thrombectomy for airway protection and concern for possible stroke in setting of new left extremity tremor. Repeat stroke workup negative, however with elevated concern for seizure. Patient Keppra loaded, with initiation of Keppra 500mg BID. vEEG without evidence of seizure activity, however, will continue Keppra given new left sided tremor. Patient successfully extubated, however, non-arousable, somnolent, and lethargic, encephalopathic. MRI brain demonstrating stroke of right MCA involving the basal ganglia, frontal lobe and temporal lobe with resultant left sided weakness and metabolic encephalopathy. Hospital course complicated by persistent hypertension, initially requiring nicardipine gtt. Home amlodipine 5mg increased to 10mg, with initiation of enalapril and coreg for optimization of blood pressures. Patient at risk for aspiration, now s/p PEG tube placement, discharged to [facility].         New medications: Keppra 500mg BID, Coreg 12.5, Enalapril ____,     Plavix 75mg, Melatonin 5mg, Modafinil 100mg,          Tube Feeds: Jevity 1.2 bolus feeds: 2 cans for breakfast, 2 cans for lunch, 1 can for snack, 2 cans for dinner.        *** If patient agitated, [facility] can consider starting Seroquel 12.5mg BID ***        Labs to be followed outpatient: N/A        Imaging to be followed up outpatient: LOC         Exam to be followed outpatient: Comprehensive physical exam         Dispo: [facility] 63 y/o M with PMHx of HTN and CVA (2 months ago) transferred from [facility] s/p stroke (left facial and eyelid drooling and speech slurring presenting for possible thrombectomy, as patient outside window for tPA - c/b reintubation following thrombectomy, now with metabolic encephalopathy 2/2 stroke, s/p PEG placement.         Problem List/Main Diagnoses (system-based):         Inpatient treatment course: 64M with PMH of dementia, HTN and CVA (2 months ago) transferred from [facility] s/p possible stroke (left facial and eyelid drooling and speech slurring). Patient was transferred to [facility] for thrombectomy as patient outside window of tPA. CTH/brain and perfusion imaging negative for stroke on admission. Patient had posterior collaterals on angiogram, making chronic ischemia a more likely etiology of presentation, so thrombectomy aborted. However, patient emergently intubation following thrombectomy for airway protection and concern for possible stroke in setting of new left extremity tremor. Repeat stroke workup negative, however with elevated concern for seizure. Patient Keppra loaded, with initiation of Keppra 500mg BID. vEEG without evidence of seizure activity, however, will continue Keppra given new left sided tremor. Patient successfully extubated, however, non-arousable, somnolent, and lethargic, encephalopathic. MRI brain demonstrating stroke of right MCA involving the basal ganglia, frontal lobe and temporal lobe with resultant left sided weakness and metabolic encephalopathy. Hospital course complicated by persistent hypertension, initially requiring nicardipine gtt. Home amlodipine 5mg increased to 10mg, with initiation of enalapril and coreg for optimization of blood pressures. Patient at risk for aspiration, now s/p PEG tube placement, discharged to [facility].         New medications: Keppra 500mg BID, Coreg 12.5, Enalapril 10, Plavix 75mg, Melatonin 5mg, Modafinil 100mg, Doxazosin 4mg, Atorvastatin 80mg         Tube Feeds: Jevity 1.2 bolus feeds: 2 cans for breakfast, 2 cans for lunch, 1 can for snack, 2 cans for dinner.        *** If patient agitated, [facility] can consider starting Seroquel 12.5mg BID ***        Labs to be followed outpatient: N/A        Imaging to be followed up outpatient: LOC         Exam to be followed outpatient: Comprehensive physical exam         Dispo: [facility] 63 y/o M with PMHx of HTN and CVA (2 months ago) transferred from [facility] s/p stroke (left facial and eyelid drooling and speech slurring presenting for possible thrombectomy, as patient outside window for tPA - c/b reintubation following thrombectomy, now with metabolic encephalopathy 2/2 stroke, s/p PEG placement.         Problem List/Main Diagnoses (system-based):         Inpatient treatment course: 64M with PMH of dementia, HTN and CVA (2 months ago) transferred from [facility] s/p possible stroke (left facial and eyelid drooling and speech slurring). Patient was transferred to [facility] for thrombectomy as patient outside window of tPA. CTH/brain and perfusion imaging negative for stroke on admission. Patient had posterior collaterals on angiogram, making chronic ischemia a more likely etiology of presentation, so thrombectomy aborted. However, patient emergently intubation following thrombectomy for airway protection and concern for possible stroke in setting of new left extremity tremor. Repeat stroke workup negative, however with elevated concern for seizure. Patient Keppra loaded, with initiation of Keppra 500mg BID. vEEG without evidence of seizure activity, however, will continue Keppra given new left sided tremor. Patient successfully extubated, however, non-arousable, somnolent, and lethargic, encephalopathic. MRI brain demonstrating stroke of right MCA involving the basal ganglia, frontal lobe and temporal lobe with resultant left sided weakness and metabolic encephalopathy. Hospital course complicated by persistent hypertension, initially requiring nicardipine gtt. Home amlodipine 5mg increased to 10mg, with initiation of enalapril and coreg for optimization of blood pressures. Patient at risk for aspiration, now s/p PEG tube placement, discharged to [facility].         New medications: Keppra 500mg BID, Amlodipine 10mg, Coreg 6.25 BID, Enalapril 10, Plavix 75mg, Melatonin 5mg, Modafinil 100mg, Doxazosin 4mg, Atorvastatin 80mg         Tube Feeds: Jevity 1.2 bolus feeds: 2 cans for breakfast, 2 cans for lunch, 1 can for snack, 2 cans for dinner.        *** If patient agitated, [facility] can consider starting Seroquel 12.5mg BID ***        Labs to be followed outpatient: N/A        Imaging to be followed up outpatient: LOC         Exam to be followed outpatient: Comprehensive physical exam         Dispo: [facility] 63 y/o M with PMHx of HTN and CVA (2 months ago) transferred from [facility] s/p stroke (left facial and eyelid drooling and speech slurring presenting for possible thrombectomy, as patient outside window for tPA - c/b reintubation following thrombectomy, now with metabolic encephalopathy 2/2 stroke, s/p PEG placement.         Problem List/Main Diagnoses (system-based):         Inpatient treatment course: 64M with PMH of dementia, HTN and CVA (2 months ago) transferred from [facility] s/p possible stroke (left facial and eyelid drooling and speech slurring). Patient was transferred to [facility] for thrombectomy as patient outside window of tPA. CTH/brain and perfusion imaging negative for stroke on admission. Patient had posterior collaterals on angiogram, making chronic ischemia a more likely etiology of presentation, so thrombectomy aborted. However, patient emergently intubation following thrombectomy for airway protection and concern for possible stroke in setting of new left extremity tremor. Repeat stroke workup negative, however with elevated concern for seizure. Patient Keppra loaded, with initiation of Keppra 500mg BID. vEEG without evidence of seizure activity, however, will continue Keppra given new left sided tremor. Patient successfully extubated, however, non-arousable, somnolent, and lethargic, encephalopathic. MRI brain demonstrating stroke of right MCA involving the basal ganglia, frontal lobe and temporal lobe with resultant left sided weakness and metabolic encephalopathy. Hospital course complicated by persistent hypertension, initially requiring nicardipine gtt. Home amlodipine 5mg increased to 10mg, with initiation coreg 6.25mg for optimization of blood pressures. Patient at risk for aspiration, now s/p PEG tube placement with bolus feeds, discharged to [facility].         New medications: Keppra 500mg BID, Amlodipine 10mg, Coreg 6.25 BID, Enalapril 10, Plavix 75mg, Melatonin 5mg, Modafinil 100mg, Doxazosin 4mg, Atorvastatin 80mg         Tube Feeds: Jevity 1.2 bolus feeds: 2 cans for breakfast, 2 cans for lunch, 1 can for snack, 2 cans for dinner.        *** If patient agitated, [facility] can consider starting Seroquel 12.5mg BID ***        Labs to be followed outpatient: N/A        Imaging to be followed up outpatient: LOC         Exam to be followed outpatient: Comprehensive physical exam         Dispo: [facility]

## 2020-02-14 NOTE — PROGRESS NOTE ADULT - PROBLEM SELECTOR PLAN 9
F: N/A  E: Replete K < 4.0, and Mg <2.0   N: Jevity 1.2 @ goal 65cc/hr  SCD: Lovenox F: N/A  E: Replete K < 4.0, and Mg <2.0   N: Jevity 1.2 bolus feeds  SCD: Lovenox

## 2020-02-14 NOTE — DISCHARGE NOTE NURSING/CASE MANAGEMENT/SOCIAL WORK - PATIENT PORTAL LINK FT
You can access the FollowMyHealth Patient Portal offered by [facility] by registering at the following website: [email or web address]. By joining [facility]’s FollowMyHealth portal, you will also be able to view your health information using other applications (apps) compatible with our system.

## 2020-02-14 NOTE — PROGRESS NOTE ADULT - ASSESSMENT
63 y/o M with PMHx of HTN and CVA (2 months ago) transferred from [facility] s/p stroke (left facial and eyelid drooling and speech slurring presenting for possible thrombectomy, as patient outside window for tPA - c/b reintubation following thrombectomy, now with metabolic encephalopathy 2/2 stroke, pending PEG placement. 65 y/o M with PMHx of HTN and CVA (2 months ago) transferred from [facility] s/p stroke (left facial and eyelid drooling and speech slurring presenting for possible thrombectomy, as patient outside window for tPA - c/b reintubation following thrombectomy, now with metabolic encephalopathy 2/2 stroke, s/p PEG placement with bolus feeds.

## 2020-02-14 NOTE — PROGRESS NOTE ADULT - PROBLEM SELECTOR PLAN 1
likely 2/2 R MCA infarct affecting the basal ganglia, frontal, and temporal lobes  -management per neuro  - c/w Keppra 500mg BID  - minimize ativan and sedation  - PT recommending of acute rehab facility vs [facility]    #Depression   Per family, patient with history of depression. Previously on sertraline, now on modafenil 100mg BID.    - home trazadone 100mg held (pt non-arousable)  - holding home meds in setting of lethargy likely 2/2 R MCA infarct affecting the basal ganglia, frontal, and temporal lobes  -management per neuro  - c/w Keppra 500mg BID  - minimize ativan and sedation  - PT recommending of acute rehab facility vs [facility]  - can consider initiating seroquel 12.5mg if patient agitated and not redirectable     #Depression   Per family, patient with history of depression. Previously on sertraline, now on modafenil 100mg BID.    - home trazadone 100mg held (pt non-arousable)  - holding home meds in setting of lethargy

## 2020-02-14 NOTE — DISCHARGE NOTE PROVIDER - NSDCCPTREATMENT_GEN_ALL_CORE_FT
PRINCIPAL PROCEDURE  Procedure: MRI head wo contrast  Findings and Treatment: IMPRESSION: Limited study due to motion artifact, however there is right MCA territory infarct involving the basal ganglia, frontal lobe and temporal lobe as described above. There is also chronic encephalomalacia seen in the left parietal lobe, likely from prior infarction. Small vessel ischemic disease.      SECONDARY PROCEDURE  Procedure: Insertion, PEG tube  Findings and Treatment:     Procedure: CT angiogram neck w contrast  Findings and Treatment: IMPRESSION: Normal CTA of the neck.    Procedure: CT angiogram head w contrast  Findings and Treatment: IMPRESSION: Probable high-grade stenosis of the right M1 segment rather than occlusion. Multifocal areas of narrowing involving the right A1 and left M1 segment which may be secondary to intracranial atherosclerosis.    Procedure: CT brain perfusion  Findings and Treatment: IMPRESSION: Abnormal perfusion with RAPID calculated mismatch volume of 44 ml and mismatch ratio is infinite within the right MCA territory.    Procedure: CT brain  Findings and Treatment: January 31. 2020 6:00PM  IMPRESSION:   Compared to prior study from earlier in the day, no significant change in scattered enhancement of the right frontal and posterior temporal lobes, likely secondary to subacute infarction. Recommend correlation with prior noncontrast head CT to exclude hemorrhage.      Procedure: CT brain  Findings and Treatment: January 31, 20210 2:00PM  IMPRESSION:   Gyriform sites of cortical hyperdensity, presumed contrast enhancement, are favored to be subacute sites of infarction, superimposed on more chronic sites of right MCA infarction. Please correlate with outside noncontrast head CT to ensure this is not hemorrhage.  No visible transcortical infarction on the left side at this time.    Procedure: Transthoracic echocardiogram  Findings and Treatment: CONCLUSIONS:   1. There is mild concentric left ventricular hypertrophy. The left ventricle is normal in size and systolic function with a calculated ejection fraction of >75%. Hyperdynamic left ventricular systolic function with cavity obliteration resulting in an intra-cavitary gradient of 49 mmHg.   2. The right ventricle is normal in size. Right ventricular systolic function is normal.   3. Trace mitral regurgitation.   4. There is no evidence of tricuspid regurgitation.   5. There was insufficient tricuspid regurgitation detected from which to calculate pulmonary artery systolic pressure.   6. No pericardial effusion is seen.   7. Nondiagnostic bubble study. PRINCIPAL PROCEDURE  Procedure: MRI head wo contrast  Findings and Treatment: IMPRESSION: Limited study due to motion artifact, however there is right MCA territory infarct involving the basal ganglia, frontal lobe and temporal lobe as described above. There is also chronic encephalomalacia seen in the left parietal lobe, likely from prior infarction. Small vessel ischemic disease.      SECONDARY PROCEDURE  Procedure: Insertion, PEG tube  Findings and Treatment: A feeding tube was placed in your stomach to decrease risk of aspiration - or choking on food that may cause an infection in your lungs.   Your tube feeds are Jevity 1.2: You should have  2 cans for breakfast, 2 cans for lunch, 1 can for snack, 2 cans for dinner.    Procedure: CT angiogram neck w contrast  Findings and Treatment: IMPRESSION: Normal CTA of the neck.    Procedure: CT angiogram head w contrast  Findings and Treatment: IMPRESSION: Probable high-grade stenosis of the right M1 segment rather than occlusion. Multifocal areas of narrowing involving the right A1 and left M1 segment which may be secondary to intracranial atherosclerosis.    Procedure: CT brain perfusion  Findings and Treatment: IMPRESSION: Abnormal perfusion with RAPID calculated mismatch volume of 44 ml and mismatch ratio is infinite within the right MCA territory.    Procedure: CT brain  Findings and Treatment: January 31. 2020 6:00PM  IMPRESSION:   Compared to prior study from earlier in the day, no significant change in scattered enhancement of the right frontal and posterior temporal lobes, likely secondary to subacute infarction. Recommend correlation with prior noncontrast head CT to exclude hemorrhage.      Procedure: CT brain  Findings and Treatment: January 31, 20210 2:00PM  IMPRESSION:   Gyriform sites of cortical hyperdensity, presumed contrast enhancement, are favored to be subacute sites of infarction, superimposed on more chronic sites of right MCA infarction. Please correlate with outside noncontrast head CT to ensure this is not hemorrhage.  No visible transcortical infarction on the left side at this time.    Procedure: Transthoracic echocardiogram  Findings and Treatment: CONCLUSIONS:   1. There is mild concentric left ventricular hypertrophy. The left ventricle is normal in size and systolic function with a calculated ejection fraction of >75%. Hyperdynamic left ventricular systolic function with cavity obliteration resulting in an intra-cavitary gradient of 49 mmHg.   2. The right ventricle is normal in size. Right ventricular systolic function is normal.   3. Trace mitral regurgitation.   4. There is no evidence of tricuspid regurgitation.   5. There was insufficient tricuspid regurgitation detected from which to calculate pulmonary artery systolic pressure.   6. No pericardial effusion is seen.   7. Nondiagnostic bubble study.

## 2020-02-14 NOTE — DISCHARGE NOTE PROVIDER - INSTRUCTIONS
Tube feeds through PEG: Tube feeds through PEG:  Bolus feeds: 7 cans Jevity 1.2 via PEG   2 cans for breakfast  2 cans for lunch  1 can for snack  2 cans for dinner.     Total provides: 1659 ml TV, 1991 kcal, 92 g protein, 1337ml free H2O; This meets: 24kcal/kg and 1.1 g protein/kg per ABW (82.3 kg)

## 2020-02-14 NOTE — DISCHARGE NOTE PROVIDER - NSDCCAREPROVSEEN_GEN_ALL_CORE_FT
Rochelle Tesfaye  [facility] MICU, Team  [facility] Palliative Care, Team  [facility] Stroke Service, Team

## 2020-02-14 NOTE — PROGRESS NOTE ADULT - SUBJECTIVE AND OBJECTIVE BOX
OVERNIGHT EVENTS:    SUBJECTIVE / INTERVAL HPI: Patient seen and examined at bedside.     VITAL SIGNS:  Vital Signs Last 24 Hrs  T(C): 37.3 (14 Feb 2020 10:57), Max: 37.3 (14 Feb 2020 10:57)  T(F): 99.1 (14 Feb 2020 10:57), Max: 99.1 (14 Feb 2020 10:57)  HR: 95 (14 Feb 2020 10:57) (71 - 95)  BP: 150/81 (14 Feb 2020 10:57) (150/81 - 164/88)  BP(mean): --  RR: 16 (14 Feb 2020 10:57) (16 - 16)  SpO2: 95% (14 Feb 2020 10:57) (95% - 98%)    PHYSICAL EXAM:    General: WDWN  HEENT: NC/AT; PERRL, anicteric sclera; MMM  Neck: supple  Cardiovascular: +S1/S2; RRR  Respiratory: CTA B/L; no W/R/R  Gastrointestinal: soft, NT/ND; +BSx4  Extremities: WWP; no edema, clubbing or cyanosis  Vascular: 2+ radial, DP/PT pulses B/L  Neurological: AAOx3; no focal deficits    MEDICATIONS:  MEDICATIONS  (STANDING):  amLODIPine   Tablet 5 milliGRAM(s) Oral daily  ampicillin/sulbactam  IVPB 3 Gram(s) IV Intermittent every 6 hours  aspirin  chewable 81 milliGRAM(s) Oral daily  atorvastatin 80 milliGRAM(s) Oral at bedtime  clopidogrel Tablet 75 milliGRAM(s) Oral daily  enoxaparin Injectable 40 milliGRAM(s) SubCutaneous every 24 hours  levETIRAcetam  Solution 500 milliGRAM(s) Enteral Tube every 12 hours  nystatin Powder 1 Application(s) Topical two times a day    MEDICATIONS  (PRN):  acetaminophen    Suspension .. 650 milliGRAM(s) Oral every 8 hours PRN Temp greater or equal to 38C (100.4F)  melatonin 5 milliGRAM(s) Oral at bedtime PRN Insomnia      ALLERGIES:  Allergies    Allergy Status Unknown    Intolerances        LABS:    02-13    141  |  109<H>  |  17  ----------------------------<  104<H>  3.6   |  20<L>  |  1.17    Ca    8.8      13 Feb 2020 06:56  Mg     2.2     02-13          CAPILLARY BLOOD GLUCOSE          RADIOLOGY & ADDITIONAL TESTS: Reviewed.    ASSESSMENT:    PLAN: OVERNIGHT EVENTS: No acute events overnight. Afebrile. VSS.    SUBJECTIVE / INTERVAL HPI: Patient seen and examined at bedside this AM - unable to obtain ROS as patient AAOx0.     VITAL SIGNS:  Vital Signs Last 24 Hrs  T(C): 37.3 (14 Feb 2020 10:57), Max: 37.3 (14 Feb 2020 10:57)  T(F): 99.1 (14 Feb 2020 10:57), Max: 99.1 (14 Feb 2020 10:57)  HR: 95 (14 Feb 2020 10:57) (71 - 95)  BP: 150/81 (14 Feb 2020 10:57) (150/81 - 164/88)  BP(mean): --  RR: 16 (14 Feb 2020 10:57) (16 - 16)  SpO2: 95% (14 Feb 2020 10:57) (95% - 98%)    PHYSICAL EXAM:  General: middle aged male resting in bed; NAD  HEENT: NC/AT; PERRL, anicteric sclera  Neck: supple  Cardiovascular: +S1/S2; RRR  Respiratory: CTA b/l ;no wheezing noted  Gastrointestinal: soft, NT/ND; +BSx4; PEG tube in place, c/d/i  Extremities: WWP; no edema, clubbing or cyanosis  Vascular: 2+ radial, DP/PT pulses B/L  Neurological: AAOx0, alert, but does not follow commands     MEDICATIONS:  MEDICATIONS  (STANDING):  amLODIPine   Tablet 5 milliGRAM(s) Oral daily  ampicillin/sulbactam  IVPB 3 Gram(s) IV Intermittent every 6 hours  aspirin  chewable 81 milliGRAM(s) Oral daily  atorvastatin 80 milliGRAM(s) Oral at bedtime  clopidogrel Tablet 75 milliGRAM(s) Oral daily  enoxaparin Injectable 40 milliGRAM(s) SubCutaneous every 24 hours  levETIRAcetam  Solution 500 milliGRAM(s) Enteral Tube every 12 hours  nystatin Powder 1 Application(s) Topical two times a day    MEDICATIONS  (PRN):  acetaminophen    Suspension .. 650 milliGRAM(s) Oral every 8 hours PRN Temp greater or equal to 38C (100.4F)  melatonin 5 milliGRAM(s) Oral at bedtime PRN Insomnia      ALLERGIES:  Allergies    Allergy Status Unknown    Intolerances        LABS:    02-13    141  |  109<H>  |  17  ----------------------------<  104<H>  3.6   |  20<L>  |  1.17    Ca    8.8      13 Feb 2020 06:56  Mg     2.2     02-13          CAPILLARY BLOOD GLUCOSE          RADIOLOGY & ADDITIONAL TESTS: Reviewed.    ASSESSMENT:    PLAN:

## 2020-02-14 NOTE — DISCHARGE NOTE PROVIDER - NSDCMRMEDTOKEN_GEN_ALL_CORE_FT
amLODIPine 5 mg oral tablet: 1 tab(s) orally once a day  Aspir 81 oral delayed release tablet: 1 tab(s) orally once a day  atorvastatin 20 mg oral tablet: 1 tab(s) orally once a day  sertraline 100 mg oral tablet: 1 tab(s) orally once a day  traZODone 100 mg oral tablet: 1 tab(s) orally once a day (at bedtime) acetaminophen 160 mg/5 mL oral suspension: 20.31 milliliter(s) orally every 8 hours, As needed, Temp greater or equal to 38C (100.4F)  amLODIPine 5 mg oral tablet: 1 tab(s) orally once a day  aspirin 81 mg oral tablet, chewable: 1 tab(s) orally once a day  atorvastatin 80 mg oral tablet: 1 tab(s) orally once a day (at bedtime)  clopidogrel 75 mg oral tablet: 1 tab(s) orally once a day  levETIRAcetam 100 mg/mL oral solution: 5 milliliter(s) orally every 12 hours  melatonin 5 mg oral tablet: 1 tab(s) orally once a day (at bedtime), As needed, Insomnia  modafinil 100 mg oral tablet: 1 tab(s) orally   nystatin 100,000 units/g topical powder: 1 application topically 2 times a day  sertraline 100 mg oral tablet: 1 tab(s) orally once a day  traZODone 100 mg oral tablet: 1 tab(s) orally once a day (at bedtime) acetaminophen 160 mg/5 mL oral suspension: 20.31 milliliter(s) orally every 8 hours, As needed, Temp greater or equal to 38C (100.4F)  amLODIPine 5 mg oral tablet: 1 tab(s) orally once a day  aspirin 81 mg oral tablet, chewable: 1 tab(s) orally once a day  atorvastatin 80 mg oral tablet: 1 tab(s) orally once a day (at bedtime)  clopidogrel 75 mg oral tablet: 1 tab(s) orally once a day  levETIRAcetam 100 mg/mL oral solution: 5 milliliter(s) orally every 12 hours  melatonin 5 mg oral tablet: 1 tab(s) orally once a day (at bedtime), As needed, Insomnia  modafinil 100 mg oral tablet: 1 tab(s) orally   nystatin 100,000 units/g topical powder: 1 application topically 2 times a day  sertraline 100 mg oral tablet: 1 tab(s) orally once a day acetaminophen 160 mg/5 mL oral suspension: 20.31 milliliter(s) orally every 8 hours, As needed, Temp greater or equal to 38C (100.4F)  amLODIPine 10 mg oral tablet: 1 tab(s) orally once a day  aspirin 81 mg oral tablet, chewable: 1 tab(s) orally once a day  atorvastatin 80 mg oral tablet: 1 tab(s) orally once a day (at bedtime)  carvedilol 6.25 mg oral tablet: 1 tab(s) orally every 12 hours  clopidogrel 75 mg oral tablet: 1 tab(s) orally once a day  levETIRAcetam 100 mg/mL oral solution: 5 milliliter(s) orally every 12 hours  melatonin 5 mg oral tablet: 1 tab(s) orally once a day (at bedtime), As needed, Insomnia  modafinil 100 mg oral tablet: 1 tab(s) orally   nystatin 100,000 units/g topical powder: 1 application topically 2 times a day  sertraline 100 mg oral tablet: 1 tab(s) orally once a day

## 2020-02-14 NOTE — PROGRESS NOTE ADULT - PROBLEM SELECTOR PLAN 8
Patient AAOx0, unable to make decisions. Next of kin is son in [facility]. Brother would like to take brother to [place] to pursue rehab.   - s/p PEG  - Family would like patient in [facility], pending placement Patient AAOx0, unable to make decisions. Next of kin is son in [facility]. Brother would like to take brother to [place] to pursue rehab.   - s/p PEG  - Family would like patient in [facility], pending authorization

## 2020-02-14 NOTE — DISCHARGE NOTE PROVIDER - NSDCCPCAREPLAN_GEN_ALL_CORE_FT
PRINCIPAL DISCHARGE DIAGNOSIS  Diagnosis: Acute ischemic right middle cerebral artery (MCA) stroke  Assessment and Plan of Treatment:       SECONDARY DISCHARGE DIAGNOSES  Diagnosis: Restlessness and agitation  Assessment and Plan of Treatment: Restlessness and agitation    Diagnosis: Hypertension  Assessment and Plan of Treatment: Hypertension    Diagnosis: Aspiration pneumonia  Assessment and Plan of Treatment: Aspiration pneumonia PRINCIPAL DISCHARGE DIAGNOSIS  Diagnosis: Acute ischemic right middle cerebral artery (MCA) stroke  Assessment and Plan of Treatment:       SECONDARY DISCHARGE DIAGNOSES  Diagnosis: Seizure  Assessment and Plan of Treatment: You were dound to have seizure like activity.    Diagnosis: Hypertension  Assessment and Plan of Treatment: You have a history of hypertension that is difficult o control Your home medicatiion of amlodopine 5mg was continued inpatient. You were also started on two other medications, Coeg 12.5mg and Enalapril 10mg. Please contrinue these medications outpatient and follow up with your primary care physicians.    Diagnosis: Aspiration pneumonia  Assessment and Plan of Treatment: Aspiration pneumonia PRINCIPAL DISCHARGE DIAGNOSIS  Diagnosis: Acute ischemic right middle cerebral artery (MCA) stroke  Assessment and Plan of Treatment:       SECONDARY DISCHARGE DIAGNOSES  Diagnosis: Seizure  Assessment and Plan of Treatment: You were dound to have seizure like activity.    Diagnosis: Hypertension  Assessment and Plan of Treatment: You have a history of hypertension that is difficult o control Your home medicatiion of amlodopine 5mg was continued inpatient. You were also started on two other medications, Coeg 12.5mg and Enalapril 10mg. Please contrinue these medications outpatient and follow up with your primary care physicians.    Diagnosis: Aspiration pneumonia  Assessment and Plan of Treatment: Pneumonia is an infection that inflames the air sacs in one or both lungs. The air sacs may fill with fluid or pus (purulent material), causing cough with phlegm or pus, fever, chills, and difficulty breathing. Your pneumonia was secondary to aspiration.  Aspiration pneumonia refers to the pulmonary consequences resulting from the abnormal entry of fluid, particulate exogenous substances, or endogenous secretions into the lower airways. You were placed on IV antibiotics to help treat the infection and had a PEG tube placed fo feeding to get your nutrition and prevent aspiration. PRINCIPAL DISCHARGE DIAGNOSIS  Diagnosis: Acute ischemic right middle cerebral artery (MCA) stroke  Assessment and Plan of Treatment: A stroke occurs when the blood supply to part of your brain is interrupted or reduced, preventing brain tissue from getting oxygen and nutrients. Brain cells begin to die in minutes. You had a stroke of the right middle cerebral artery, causing weakness in your left hand side and difficulty speaking. You were started medications to help control your blood pressure and cholestrol, which are risk factors for having another stroke.   Strokes affect people in different ways. Some people who have a stroke have no lasting effects. Others lose important brain functions. For example, some people become partly paralyzed or can't speak.  Speech problems – People who have a stroke sometimes can't speak anymore or can't understand speech.   Weakness and movement problems – People who have a stroke sometimes have muscle weakness or paralysis of the left or right side of the body. The muscle weakness can affect the face, arm, and leg. People who have a stroke can also have trouble walking, grasping objects, or balancing. Plus, they might not be able to make controlled, planned movements, even if the stroke did not cause weakness or loss of sensation.   Trouble eating or swallowing – People who have a stroke sometimes have trouble swallowingSometimes this problem causes food to go down the wrong way and into the lungs. This is dangerous because it can lead to lung infections, such as pneumonia. Some people need a feeding tube to deal with this problem. Others can adapt by changing the foods and liquids they eat and drink.  Problems thinking clearly or interacting with others – People who have a stroke sometimes get confused easily or have trouble staying focused. They can also have personality changes that make them react differently to others.  Depression – People who have a stroke also sometimes get depressed. This can make recovery even harder. It's important to get treated for depression after a stroke.      SECONDARY DISCHARGE DIAGNOSES  Diagnosis: Seizure  Assessment and Plan of Treatment: You were dound to have seizure like activity. A seizure is a sudden, uncontrolled electrical disturbance in the brain. It can cause changes in your behavior, movements or feelings, and in levels of consciousness. If you have two or more seizures or a tendency to have recurrent seizures, you have epilepsy. There are many types of seizures, which range in severity. Seizure types vary by where and how they begin in the brain. Most seizures last from 30 seconds to two minutes. A seizure that lasts longer than five minutes is a medical emergency.   We suspect you had a seizure and started you on seizure medication - Keppra 500mg two times a day to help prevent seizures from re-occurring.    Diagnosis: Hypertension  Assessment and Plan of Treatment: You have a history of hypertension that is difficult o control Your home medicatiion of amlodopine 5mg was increased to 10mg once a day. You were also started on two other medications, Coeg 6.25mg to be taken two times a day.  Please contrinue these medications outpatient and follow up with your primary care physician. Please keep monitor your blood pressures at home.    Diagnosis: Aspiration pneumonia  Assessment and Plan of Treatment: Pneumonia is an infection that inflames the air sacs in one or both lungs. The air sacs may fill with fluid or pus (purulent material), causing cough with phlegm or pus, fever, chills, and difficulty breathing. Your pneumonia was secondary to aspiration.  Aspiration pneumonia refers to the pulmonary consequences resulting from the abnormal entry of fluid, particulate exogenous substances, or endogenous secretions into the lower airways. You were placed on IV antibiotics to help treat the infection and had a PEG tube placed fo feeding to get your nutrition and prevent aspiration.

## 2020-02-14 NOTE — DISCHARGE NOTE PROVIDER - CARE PROVIDER_API CALL
Jennie Hawthorne (NP; RN)  NP in Family Health  [address] [address]  Phone: [phone]  Fax: [phone]  Scheduled Appointment: 02/26/2020 09:00 AM

## 2020-02-14 NOTE — PROGRESS NOTE ADULT - PROBLEM SELECTOR PLAN 5
Patient Na beginning to trend down  - C/w with tube feeds with Jevity 1.2 at 65cc/hr, feeds restarted 2/12  - Free water flushes 250 cc q 4 hours RESOLVED  Patient Na beginning to trend down  - C/w with tube feeds with Jevity 1.2 BOLUS FEEDS feeds restarted 2/12  - Free water flushes 250 cc q 4 hours

## 2020-02-14 NOTE — DISCHARGE NOTE PROVIDER - NSDCACTIVITY_GEN_ALL_CORE
Bathing allowed/Do not drive or operate machinery/Showering allowed/Do not make important decisions/No heavy lifting/straining

## 2020-02-15 PROCEDURE — 99231 SBSQ HOSP IP/OBS SF/LOW 25: CPT

## 2020-02-15 RX ORDER — QUETIAPINE FUMARATE 200 MG/1
12.5 TABLET, FILM COATED ORAL ONCE
Refills: 0 | Status: COMPLETED | OUTPATIENT
Start: 2020-02-15 | End: 2020-02-15

## 2020-02-15 RX ORDER — MODAFINIL 200 MG/1
1 TABLET ORAL
Qty: 0 | Refills: 0 | DISCHARGE
Start: 2020-02-15

## 2020-02-15 RX ORDER — AMLODIPINE BESYLATE 2.5 MG/1
1 TABLET ORAL
Qty: 0 | Refills: 0 | DISCHARGE
Start: 2020-02-15

## 2020-02-15 RX ADMIN — ENOXAPARIN SODIUM 40 MILLIGRAM(S): 100 INJECTION SUBCUTANEOUS at 11:47

## 2020-02-15 RX ADMIN — Medication 5 MILLIGRAM(S): at 23:25

## 2020-02-15 RX ADMIN — NYSTATIN CREAM 1 APPLICATION(S): 100000 CREAM TOPICAL at 17:44

## 2020-02-15 RX ADMIN — Medication 81 MILLIGRAM(S): at 11:47

## 2020-02-15 RX ADMIN — NYSTATIN CREAM 1 APPLICATION(S): 100000 CREAM TOPICAL at 07:05

## 2020-02-15 RX ADMIN — ATORVASTATIN CALCIUM 80 MILLIGRAM(S): 80 TABLET, FILM COATED ORAL at 23:25

## 2020-02-15 RX ADMIN — LEVETIRACETAM 500 MILLIGRAM(S): 250 TABLET, FILM COATED ORAL at 11:47

## 2020-02-15 RX ADMIN — LEVETIRACETAM 500 MILLIGRAM(S): 250 TABLET, FILM COATED ORAL at 23:40

## 2020-02-15 RX ADMIN — AMPICILLIN SODIUM AND SULBACTAM SODIUM 200 GRAM(S): 250; 125 INJECTION, POWDER, FOR SUSPENSION INTRAMUSCULAR; INTRAVENOUS at 17:43

## 2020-02-15 RX ADMIN — AMPICILLIN SODIUM AND SULBACTAM SODIUM 200 GRAM(S): 250; 125 INJECTION, POWDER, FOR SUSPENSION INTRAMUSCULAR; INTRAVENOUS at 23:25

## 2020-02-15 RX ADMIN — QUETIAPINE FUMARATE 12.5 MILLIGRAM(S): 200 TABLET, FILM COATED ORAL at 23:25

## 2020-02-15 RX ADMIN — AMPICILLIN SODIUM AND SULBACTAM SODIUM 200 GRAM(S): 250; 125 INJECTION, POWDER, FOR SUSPENSION INTRAMUSCULAR; INTRAVENOUS at 07:05

## 2020-02-15 RX ADMIN — AMLODIPINE BESYLATE 5 MILLIGRAM(S): 2.5 TABLET ORAL at 07:05

## 2020-02-15 RX ADMIN — AMPICILLIN SODIUM AND SULBACTAM SODIUM 200 GRAM(S): 250; 125 INJECTION, POWDER, FOR SUSPENSION INTRAMUSCULAR; INTRAVENOUS at 11:47

## 2020-02-15 RX ADMIN — MODAFINIL 100 MILLIGRAM(S): 200 TABLET ORAL at 07:05

## 2020-02-15 RX ADMIN — MODAFINIL 100 MILLIGRAM(S): 200 TABLET ORAL at 11:48

## 2020-02-15 RX ADMIN — CLOPIDOGREL BISULFATE 75 MILLIGRAM(S): 75 TABLET, FILM COATED ORAL at 11:47

## 2020-02-15 NOTE — PROGRESS NOTE ADULT - PROBLEM SELECTOR PLAN 5
RESOLVED  Patient Na beginning to trend down  - C/w with tube feeds with Jevity 1.2 BOLUS FEEDS feeds restarted 2/12  - Free water flushes 250 cc q 4 hours

## 2020-02-15 NOTE — PROGRESS NOTE ADULT - PROBLEM SELECTOR PLAN 8
Patient AAOx0, unable to make decisions. Next of kin is son in [facility]. Brother would like to take brother to [place] to pursue rehab.   - s/p PEG  - Family would like patient in [facility], pending authorization

## 2020-02-15 NOTE — PROGRESS NOTE ADULT - PROBLEM SELECTOR PLAN 6
RESOLVED  now improving, likely prerenal due to decreased PO intake  - free water flushes 250 q5hr   - Trend BMP   - Avoid nephrotoxic medications

## 2020-02-15 NOTE — PROGRESS NOTE ADULT - PROBLEM SELECTOR PLAN 1
likely 2/2 R MCA infarct affecting the basal ganglia, frontal, and temporal lobes  -management per neuro  - c/w Keppra 500mg BID  - minimize ativan and sedation  - PT recommending of acute rehab facility vs [facility]  - can consider initiating seroquel 12.5mg if patient agitated and not redirectable     #Depression   Per family, patient with history of depression. Previously on sertraline, now on modafenil 100mg BID.    - home trazadone 100mg held (pt non-arousable)  - holding home meds in setting of lethargy

## 2020-02-15 NOTE — PROGRESS NOTE ADULT - ASSESSMENT
65 y/o M with PMHx of HTN and CVA (2 months ago) transferred from [facility] s/p stroke (left facial and eyelid drooling and speech slurring presenting for possible thrombectomy, as patient outside window for tPA - c/b reintubation following thrombectomy, now with metabolic encephalopathy 2/2 stroke, s/p PEG placement with bolus feeds.

## 2020-02-15 NOTE — PROGRESS NOTE ADULT - PROBLEM SELECTOR PLAN 4
Patient with hypertensive emergency on admission requiring cardene gtt. Patient now normotensive.   - Amlodopine 5mg restared following PEG placement   - will add enalapril, and coreg started this admission as needed    - SBP goal <180

## 2020-02-15 NOTE — PROGRESS NOTE ADULT - PROBLEM SELECTOR PLAN 3
Patient febrile to 100.4 2/9. CXR clear, UA without evidence of infection. Blood cultures NGTD. UA neg.  - vanc/zosyn d/c, patient likely aspirated, switch to Unasyn 3g q6hr (7 days total 2/10-2/16) -> if d/c will discharge on augmentin   - blood cultures NGTD   - aspiration precautions  - bolus feeds in setting of aspiration

## 2020-02-15 NOTE — PROGRESS NOTE ADULT - SUBJECTIVE AND OBJECTIVE BOX
OVERNIGHT EVENTS: No acute events overnight. Afebrile. VSS.    SUBJECTIVE / INTERVAL HPI: Patient seen and examined at bedside this AM - unable to obtain ROS as patient AAOx0.     VITAL SIGNS:  Vital Signs Last 24 Hrs  T(C): 36.4 (15 Feb 2020 11:12), Max: 36.6 (15 Feb 2020 05:34)  T(F): 97.6 (15 Feb 2020 11:12), Max: 97.8 (15 Feb 2020 05:34)  HR: 86 (15 Feb 2020 11:12) (74 - 86)  BP: 163/86 (15 Feb 2020 11:12) (129/74 - 168/90)  RR: 18 (15 Feb 2020 11:12) (18 - 18)  SpO2: 94% (15 Feb 2020 11:12) (94% - 96%)    PHYSICAL EXAM:  General: middle aged male resting in bed; NAD  HEENT: NC/AT; PERRL, anicteric sclera  Neck: supple  Cardiovascular: +S1/S2; RRR  Respiratory: CTA b/l ;no wheezing noted  Gastrointestinal: soft, NT/ND; +BSx4; PEG tube in place, c/d/i  Extremities: WWP; no edema, clubbing or cyanosis  Vascular: 2+ radial, DP/PT pulses B/L  Neurological: AAOx0, alert, but does not follow commands     MEDICATIONS:  MEDICATIONS  (STANDING):  amLODIPine   Tablet 5 milliGRAM(s) Oral daily  ampicillin/sulbactam  IVPB 3 Gram(s) IV Intermittent every 6 hours  aspirin  chewable 81 milliGRAM(s) Oral daily  atorvastatin 80 milliGRAM(s) Oral at bedtime  clopidogrel Tablet 75 milliGRAM(s) Oral daily  enoxaparin Injectable 40 milliGRAM(s) SubCutaneous every 24 hours  levETIRAcetam  Solution 500 milliGRAM(s) Enteral Tube every 12 hours  nystatin Powder 1 Application(s) Topical two times a day    MEDICATIONS  (PRN):  acetaminophen    Suspension .. 650 milliGRAM(s) Oral every 8 hours PRN Temp greater or equal to 38C (100.4F)  melatonin 5 milliGRAM(s) Oral at bedtime PRN Insomnia      ALLERGIES:  Allergies    Allergy Status Unknown    Intolerances        LABS:                         RADIOLOGY, EKG & ADDITIONAL TESTS: Reviewed.

## 2020-02-16 PROCEDURE — 99231 SBSQ HOSP IP/OBS SF/LOW 25: CPT

## 2020-02-16 RX ADMIN — CLOPIDOGREL BISULFATE 75 MILLIGRAM(S): 75 TABLET, FILM COATED ORAL at 11:59

## 2020-02-16 RX ADMIN — AMLODIPINE BESYLATE 5 MILLIGRAM(S): 2.5 TABLET ORAL at 07:04

## 2020-02-16 RX ADMIN — MODAFINIL 100 MILLIGRAM(S): 200 TABLET ORAL at 07:04

## 2020-02-16 RX ADMIN — AMPICILLIN SODIUM AND SULBACTAM SODIUM 200 GRAM(S): 250; 125 INJECTION, POWDER, FOR SUSPENSION INTRAMUSCULAR; INTRAVENOUS at 17:16

## 2020-02-16 RX ADMIN — Medication 81 MILLIGRAM(S): at 11:59

## 2020-02-16 RX ADMIN — ATORVASTATIN CALCIUM 80 MILLIGRAM(S): 80 TABLET, FILM COATED ORAL at 22:57

## 2020-02-16 RX ADMIN — MODAFINIL 100 MILLIGRAM(S): 200 TABLET ORAL at 13:39

## 2020-02-16 RX ADMIN — LEVETIRACETAM 500 MILLIGRAM(S): 250 TABLET, FILM COATED ORAL at 09:56

## 2020-02-16 RX ADMIN — LEVETIRACETAM 500 MILLIGRAM(S): 250 TABLET, FILM COATED ORAL at 22:58

## 2020-02-16 RX ADMIN — Medication 5 MILLIGRAM(S): at 23:00

## 2020-02-16 RX ADMIN — NYSTATIN CREAM 1 APPLICATION(S): 100000 CREAM TOPICAL at 17:16

## 2020-02-16 RX ADMIN — ENOXAPARIN SODIUM 40 MILLIGRAM(S): 100 INJECTION SUBCUTANEOUS at 08:50

## 2020-02-16 RX ADMIN — AMPICILLIN SODIUM AND SULBACTAM SODIUM 200 GRAM(S): 250; 125 INJECTION, POWDER, FOR SUSPENSION INTRAMUSCULAR; INTRAVENOUS at 11:59

## 2020-02-16 RX ADMIN — NYSTATIN CREAM 1 APPLICATION(S): 100000 CREAM TOPICAL at 07:03

## 2020-02-16 RX ADMIN — AMPICILLIN SODIUM AND SULBACTAM SODIUM 200 GRAM(S): 250; 125 INJECTION, POWDER, FOR SUSPENSION INTRAMUSCULAR; INTRAVENOUS at 07:01

## 2020-02-16 NOTE — PROGRESS NOTE ADULT - PROBLEM SELECTOR PLAN 1
likely 2/2 R MCA infarct affecting the basal ganglia, frontal, and temporal lobes  -management per neuro  - c/w Keppra 500mg BID  - minimize ativan and sedation  - PT recommending of acute rehab facility vs [facility]  - received  seroquel 12.5mg in setting of agitation -> will hold off on initiatng standing regimen     #Depression   Per family, patient with history of depression. Previously on sertraline, now on modafenil 100mg BID.    - home trazadone 100mg held (pt non-arousable)  - holding home meds in setting of lethargy

## 2020-02-16 NOTE — PROGRESS NOTE ADULT - ASSESSMENT
63 y/o M with PMHx of HTN and CVA (2 months ago) transferred from [facility] s/p stroke (left facial and eyelid drooling and speech slurring presenting for possible thrombectomy, as patient outside window for tPA - c/b reintubation following thrombectomy, now with metabolic encephalopathy 2/2 stroke, s/p PEG placement with bolus feeds. Pending placement to [facility].

## 2020-02-17 PROCEDURE — 99231 SBSQ HOSP IP/OBS SF/LOW 25: CPT

## 2020-02-17 RX ORDER — AMLODIPINE BESYLATE 2.5 MG/1
10 TABLET ORAL DAILY
Refills: 0 | Status: DISCONTINUED | OUTPATIENT
Start: 2020-02-18 | End: 2020-02-18

## 2020-02-17 RX ORDER — AMLODIPINE BESYLATE 2.5 MG/1
5 TABLET ORAL ONCE
Refills: 0 | Status: COMPLETED | OUTPATIENT
Start: 2020-02-17 | End: 2020-02-17

## 2020-02-17 RX ADMIN — AMLODIPINE BESYLATE 5 MILLIGRAM(S): 2.5 TABLET ORAL at 16:36

## 2020-02-17 RX ADMIN — NYSTATIN CREAM 1 APPLICATION(S): 100000 CREAM TOPICAL at 06:39

## 2020-02-17 RX ADMIN — LEVETIRACETAM 500 MILLIGRAM(S): 250 TABLET, FILM COATED ORAL at 09:18

## 2020-02-17 RX ADMIN — NYSTATIN CREAM 1 APPLICATION(S): 100000 CREAM TOPICAL at 17:43

## 2020-02-17 RX ADMIN — CLOPIDOGREL BISULFATE 75 MILLIGRAM(S): 75 TABLET, FILM COATED ORAL at 12:12

## 2020-02-17 RX ADMIN — MODAFINIL 100 MILLIGRAM(S): 200 TABLET ORAL at 06:39

## 2020-02-17 RX ADMIN — Medication 81 MILLIGRAM(S): at 12:12

## 2020-02-17 RX ADMIN — ENOXAPARIN SODIUM 40 MILLIGRAM(S): 100 INJECTION SUBCUTANEOUS at 09:18

## 2020-02-17 RX ADMIN — MODAFINIL 100 MILLIGRAM(S): 200 TABLET ORAL at 12:12

## 2020-02-17 RX ADMIN — AMLODIPINE BESYLATE 5 MILLIGRAM(S): 2.5 TABLET ORAL at 06:39

## 2020-02-17 NOTE — PROGRESS NOTE ADULT - ASSESSMENT
per Neurology    65 y/o M with PMHx of HTN and CVA (2 months ago) transferred from [facility] s/p stroke (left facial and eyelid drooling and speech slurring presenting for possible thrombectomy, as patient outside window for tPA - c/b reintubation following thrombectomy, now with metabolic encephalopathy 2/2 stroke, s/p PEG placement with bolus feeds. Pending placement to [facility].        Problem/Plan - 1:  ·  Problem: Metabolic encephalopathy.  Plan: likely 2/2 R MCA infarct affecting the basal ganglia, frontal, and temporal lobes  -management per neuro  - c/w Keppra 500mg BID  - minimize ativan and sedation  - PT recommending of acute rehab facility vs [facility]  - received  seroquel 12.5mg in setting of agitation -> will hold off on initiatng standing regimen     #Depression   Per family, patient with history of depression. Previously on sertraline, now on modafenil 100mg BID.    - home trazadone 100mg held (pt non-arousable)  - holding home meds in setting of lethargy.     Problem/Plan - 2:  ·  Problem: Cerebrovascular accident (CVA), unspecified mechanism.  Plan: History of CVA, Per medical records patient with CVA, 2 months ago. 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    - c/w ASA 81mg PO daily and Plavix 75mg PO daily    - c/w Atorvastatin 80mg PO daily  - LOC to be performed outpatient.     Problem/Plan - 3:  ·  Problem: Aspiration pneumonia.  Plan: Patient febrile to 100.4 2/9. CXR clear, UA without evidence of infection. Blood cultures NGTD. UA neg.  - vanc/zosyn d/c, patient likely aspirated, switch to Unasyn 3g q6hr (7 days total 2/10-2/16) -> if d/c will discharge on augmentin   - blood cultures NGTD   - aspiration precautions  - bolus feeds in setting of aspiration.     Problem/Plan - 4:  ·  Problem: Hypertension, unspecified type.  Plan: Patient with hypertensive emergency on admission requiring cardene gtt. Patient now normotensive.   - Amlodopine 5mg restared following PEG placement   - will add enalapril, and coreg started this admission as needed    - SBP goal <180.     Problem/Plan - 5:  ·  Problem: Hypernatremia.  Plan: RESOLVED  Patient Na beginning to trend down  - C/w with tube feeds with Jevity 1.2 BOLUS FEEDS feeds restarted 2/12  - Free water flushes 250 cc q 4 hours.     Problem/Plan - 6:  Problem: PAULY (acute kidney injury). Plan: RESOLVED  now improving, likely prerenal due to decreased PO intake  - free water flushes 250 q5hr   - Trend BMP   - Avoid nephrotoxic medications.    Problem/Plan - 7:  ·  Problem: Overflow incontinence.  Plan: #Overflow incontinence: RESOLVED  Patient with increase urinary output, concern for overflow incontinence. Lacy removed 2/12 -> passed TOV  - c/w Doxazosin 1mg 2mg qd at bedtime.     Problem/Plan - 8:  ·  Problem: Goals of care, counseling/discussion.  Plan: Patient AAOx0, unable to make decisions. Next of kin is son in [facility]. Brother would like to take brother to [place] to pursue rehab.   - s/p PEG  - Family would like patient in [facility], pending authorization.     Problem/Plan - 9:  ·  Problem: Nutrition, metabolism, and development symptoms.  Plan: F: N/A  E: Replete K < 4.0, and Mg <2.0   N: Jevity 1.2 bolus feeds  SCD: Lovenox.

## 2020-02-17 NOTE — PROGRESS NOTE ADULT - PROBLEM SELECTOR PLAN 4
Patient with hypertensive emergency on admission requiring cardene gtt. Patient now normotensive.   - Amlodopine increased to 10mg in setting of SBP 150s  - will add enalapril, and coreg started this admission as needed

## 2020-02-17 NOTE — PROGRESS NOTE ADULT - SUBJECTIVE AND OBJECTIVE BOX
**Incomplete Note** OVERNIGHT EVENTS: No acute events overnight. Afebrile. VSS. Afebrile. Per nurse, family bedside yesterday and patient reportedly calm and able to verbalize desire for water in Lao, although with muffled speech.     SUBJECTIVE / INTERVAL HPI: Patient seen and examined at bedside this AM, unable to obtain ROS.     VITAL SIGNS:  Vital Signs Last 24 Hrs  T(C): 36.6 (17 Feb 2020 10:52), Max: 37 (17 Feb 2020 06:15)  T(F): 97.9 (17 Feb 2020 10:52), Max: 98.6 (17 Feb 2020 06:15)  HR: 95 (17 Feb 2020 10:52) (73 - 95)  BP: 158/91 (17 Feb 2020 10:52) (148/79 - 158/91)  BP(mean): --  RR: 20 (17 Feb 2020 10:52) (17 - 20)  SpO2: 97% (17 Feb 2020 10:52) (95% - 97%)    PHYSICAL EXAM:    General: middle aged male resting in bed; mildly agitated, NAD   HEENT: NC/AT; PERRL  Neck: supple, no JVD, no cervical or submandibular lymphadenopathy   Cardiovascular: +S1/S2; RRR, no murmurs appreciated   Respiratory: CTA b/l  Gastrointestinal: soft, NT/ND; +BSx4; PEG tube in place, c/d/i  Extremities: WWP; no edema, clubbing or cyanosis  Vascular: 2+ radial, DP/PT pulses B/L  Neurological: AAOx 0, alert, responsive to noxious stimuli, but does not follow commands     MEDICATIONS:  MEDICATIONS  (STANDING):  amLODIPine   Tablet 5 milliGRAM(s) Oral daily  aspirin  chewable 81 milliGRAM(s) Oral daily  atorvastatin 80 milliGRAM(s) Oral at bedtime  clopidogrel Tablet 75 milliGRAM(s) Oral daily  enoxaparin Injectable 40 milliGRAM(s) SubCutaneous every 24 hours  levETIRAcetam  Solution 500 milliGRAM(s) Enteral Tube every 12 hours  modafinil 100 milliGRAM(s) Oral <User Schedule>  nystatin Powder 1 Application(s) Topical two times a day    MEDICATIONS  (PRN):  acetaminophen    Suspension .. 650 milliGRAM(s) Oral every 8 hours PRN Temp greater or equal to 38C (100.4F)  melatonin 5 milliGRAM(s) Oral at bedtime PRN Insomnia      ALLERGIES:  Allergies    Allergy Status Unknown    Intolerances        LABS:              CAPILLARY BLOOD GLUCOSE          RADIOLOGY & ADDITIONAL TESTS: Reviewed.    ASSESSMENT:    PLAN:

## 2020-02-17 NOTE — PROGRESS NOTE ADULT - PROBLEM SELECTOR PLAN 8
Patient AAOx0, unable to make decisions. Next of kin is son in [facility]. Brother would like to take brother to [place] to pursue rehab.   - s/p PEG  - Family would like patient in [facility], pending authorization

## 2020-02-17 NOTE — PROGRESS NOTE ADULT - ASSESSMENT
63 y/o M with PMHx of HTN and CVA (2 months ago) transferred from [facility] s/p stroke (left facial and eyelid drooling and speech slurring presenting for possible thrombectomy, as patient outside window for tPA - c/b reintubation following thrombectomy, now with metabolic encephalopathy 2/2 stroke, s/p PEG placement with bolus feeds. Pending placement to [facility].

## 2020-02-17 NOTE — PROGRESS NOTE ADULT - PROBLEM SELECTOR PLAN 3
RESOLVED    Patient febrile to 100.4 2/9. CXR clear, UA without evidence of infection. Blood cultures NGTD. UA neg.  - vanc/zosyn d/c, patient likely aspirated, switch to Unasyn 3g q6hr (7 days total 2/10-2/16) -> COMPLETED  - blood cultures NGTD   - aspiration precautions  - bolus feeds in setting of aspiration

## 2020-02-17 NOTE — PROGRESS NOTE ADULT - SUBJECTIVE AND OBJECTIVE BOX
Physical Medicine and Rehabilitation Progress Note:    Patient is a 64y old  Male who presents with a chief complaint of transfer from [facility] for stroke (17 Feb 2020 06:42)      HPI:  History obtained from [place] medical record, as patient altered and subsequently intubated.     63 y/o male with PMH hypertension CVA (2 months ago at [facility]), dementia and depression transferred from [facility] with concern for stroke (MCA). Patient presented to [facility] after presenting to pharmacy with shirt and shoes on backwards and episode of falling on floor and speech slurring in pharmacy, found to have left sided facial drooping of eyelids, slurred speech, and expressive aphasia on presentation to [place]. Last known normal unknown. Baseline mental status AAOx3 able to follow commands, completes ADLs independently.     Patient outside of window for tPA, transferred to [facility] for possible thrombectomy. At [facility], stroke code called on admission - imaging negative for acute infarct. Thrombectomy of R MCA attempted, however aborted as noted to have stump occlusion of mid right M1 with extensive JOSEPHINE collaterals - likely chronic occulusion. Patient extubated and upon presentation to MICU, patient noted to be altered with tongue rolling back, with contraction of left upper and lower extremity - patient intubated for airway protection. Second stroke code called, with repeat imaging without evidence of acute infarct. Patients admitted to MICU, intubated pending vEEG.      [place]: Vitals: T: Afebrile | HR: 91-93 | BP: 147-159/ (MAP: ) | RR: 20-22 |   Labs: BMP: WNL | UA: WNL | BAL <10 | Lipid panel: ; Cholesterol 176 |   Interventions: CTH: No acute intracranial abnormality; chronic microvascular ischemic and volume changes - old posterior right frontal cortical infarct noted.   CT Perfusion Brain w/ contrast: Right MCA abnormal flow pattern consistent with ischemic change; no discrete evidence of infarct.     [facility]: Vitals: T: Afebrile | HR: 90 | BP: 208/123 | RR: 14  Labs: WBC 6.88; Hgb 13; Plt 144; Cr 1.21; Glucose 104  CT Brain Stroke (on arrival): Gyriform sites of cortical hyperdensity, presumed contrast enhancement, are favored to be subacute sites of infarction, superimposed on more chronic sites of right MCA infarction.  CT Brain Stroke (2nd stroke code, following aborted thrombectomy): Compared to prior study from earlier in the day, no significant change in scattered enhancement of the right frontal and posterior temporal lobes, likely secondary to subacute infarction  CT Perfusion: Abnormal perfusion with RAPID calculated mismatch volume of 44 ml and mismatch ratio is infinite within the right MCA territory.  CT Angio Head: Probable high-grade stenosis of the right M1 segment rather than occlusion. Multifocal areas of narrowing involving the right A1 and left M1 segment which may be secondary to intracranial atherosclerosis.  CT Angio Neck: Normal CTA neck.     Interventions: Intubated and sedated s/p thrombectomy, vEEG placed (31 Jan 2020 19:25)                Vital Signs Last 24 Hrs  T(C): 36.6 (17 Feb 2020 10:52), Max: 37 (17 Feb 2020 06:15)  T(F): 97.9 (17 Feb 2020 10:52), Max: 98.6 (17 Feb 2020 06:15)  HR: 95 (17 Feb 2020 10:52) (73 - 95)  BP: 158/91 (17 Feb 2020 10:52) (148/79 - 158/91)  BP(mean): --  RR: 20 (17 Feb 2020 10:52) (17 - 20)  SpO2: 97% (17 Feb 2020 10:52) (95% - 97%)    MEDICATIONS  (STANDING):  amLODIPine   Tablet 5 milliGRAM(s) Oral daily  aspirin  chewable 81 milliGRAM(s) Oral daily  atorvastatin 80 milliGRAM(s) Oral at bedtime  clopidogrel Tablet 75 milliGRAM(s) Oral daily  enoxaparin Injectable 40 milliGRAM(s) SubCutaneous every 24 hours  levETIRAcetam  Solution 500 milliGRAM(s) Enteral Tube every 12 hours  modafinil 100 milliGRAM(s) Oral <User Schedule>  nystatin Powder 1 Application(s) Topical two times a day    MEDICATIONS  (PRN):  acetaminophen    Suspension .. 650 milliGRAM(s) Oral every 8 hours PRN Temp greater or equal to 38C (100.4F)  melatonin 5 milliGRAM(s) Oral at bedtime PRN Insomnia    Currently Undergoing Physical Therapy at bedside.    Functional Status Assessment:  2/14/2020          PM&R Impression: as above    Current Disposition Plan Recommendations: subacute rehab placement

## 2020-02-18 VITALS
OXYGEN SATURATION: 95 % | SYSTOLIC BLOOD PRESSURE: 149 MMHG | DIASTOLIC BLOOD PRESSURE: 92 MMHG | RESPIRATION RATE: 18 BRPM | TEMPERATURE: 98 F | HEART RATE: 87 BPM

## 2020-02-18 PROCEDURE — 99238 HOSP IP/OBS DSCHRG MGMT 30/<: CPT

## 2020-02-18 RX ORDER — CARVEDILOL PHOSPHATE 80 MG/1
1 CAPSULE, EXTENDED RELEASE ORAL
Qty: 0 | Refills: 0 | DISCHARGE
Start: 2020-02-18

## 2020-02-18 RX ORDER — CARVEDILOL PHOSPHATE 80 MG/1
6.25 CAPSULE, EXTENDED RELEASE ORAL EVERY 12 HOURS
Refills: 0 | Status: DISCONTINUED | OUTPATIENT
Start: 2020-02-18 | End: 2020-02-18

## 2020-02-18 RX ORDER — AMLODIPINE BESYLATE 2.5 MG/1
1 TABLET ORAL
Qty: 0 | Refills: 0 | DISCHARGE
Start: 2020-02-18

## 2020-02-18 RX ADMIN — CLOPIDOGREL BISULFATE 75 MILLIGRAM(S): 75 TABLET, FILM COATED ORAL at 12:00

## 2020-02-18 RX ADMIN — CARVEDILOL PHOSPHATE 6.25 MILLIGRAM(S): 80 CAPSULE, EXTENDED RELEASE ORAL at 10:59

## 2020-02-18 RX ADMIN — MODAFINIL 100 MILLIGRAM(S): 200 TABLET ORAL at 06:46

## 2020-02-18 RX ADMIN — MODAFINIL 100 MILLIGRAM(S): 200 TABLET ORAL at 13:14

## 2020-02-18 RX ADMIN — AMLODIPINE BESYLATE 10 MILLIGRAM(S): 2.5 TABLET ORAL at 06:46

## 2020-02-18 RX ADMIN — Medication 81 MILLIGRAM(S): at 12:00

## 2020-02-18 RX ADMIN — NYSTATIN CREAM 1 APPLICATION(S): 100000 CREAM TOPICAL at 06:46

## 2020-02-18 RX ADMIN — LEVETIRACETAM 500 MILLIGRAM(S): 250 TABLET, FILM COATED ORAL at 10:59

## 2020-02-18 RX ADMIN — ENOXAPARIN SODIUM 40 MILLIGRAM(S): 100 INJECTION SUBCUTANEOUS at 10:59

## 2020-02-18 NOTE — PROGRESS NOTE ADULT - REASON FOR ADMISSION
transfer from OSH for stroke
stroke
transfer from OSH for stroke

## 2020-02-18 NOTE — PROGRESS NOTE ADULT - PROBLEM SELECTOR PLAN 10
1.       PCP Contacted on Admission: (Y/N) --> Name & Phone #:  2.       Date of Contact with PCP:  3.       PCP Contacted at Discharge: (Y/N)  4.       Summary of Handoff Given to PCP:  5.       Post-Discharge Appointment Date and Location: [facility]
1.       PCP Contacted on Admission: (Y/N) --> Name & Phone #:  2.       Date of Contact with PCP:  3.       PCP Contacted at Discharge: (Y/N)  4.       Summary of Handoff Given to PCP:  5.       Post-Discharge Appointment Date and Location: [facility]
1.       PCP Contacted on Admission: (Y/N) --> Name & Phone #:  2.       Date of Contact with PCP:  3.       PCP Contacted at Discharge: (Y/N)  4.       Summary of Handoff Given to PCP:  5.       Post-Discharge Appointment Date and Location: [facility]
1.       PCP Contacted on Admission: (Y/N) --> Name & Phone #:  2.       Date of Contact with PCP:  3.       PCP Contacted at Discharge: (Y/N)  4.       Summary of Handoff Given to PCP:  5.       Post-Discharge Appointment Date and Location: [facility]
1.       PCP Contacted on Admission: (Y/N) --> Name & Phone #:  2.       Date of Contact with PCP:  3.       PCP Contacted at Discharge: (Y/N)  4.       Summary of Handoff Given to PCP:  5.       Post-Discharge Appointment Date and Location: [facility]
1.       PCP Contacted on Admission: (Y/N) --> Name & Phone #:  2.       Date of Contact with PCP:  3.       PCP Contacted at Discharge: (Y/N)  4.       Summary of Handoff Given to PCP:  5.       Post-Discharge Appointment Date and Location: [facility]
1.       PCP Contacted on Admission: (Y/N) --> Name & Phone #:  2.       Date of Contact with PCP:  3.       PCP Contacted at Discharge: (Y/N)  4.       Summary of Handoff Given to PCP:  5.       Post-Discharge Appointment Date and Location: [facility]
1.       PCP Contacted on Admission: (Y/N) --> Name & Phone #:  2.       Date of Contact with PCP:  3.       PCP Contacted at Discharge: (Y/N)  4.       Summary of Handoff Given to PCP:  5.       Post-Discharge Appointment Date and Location: [facility]
1.       PCP Contacted on Admission: (Y/N) --> Name & Phone #:  2.       Date of Contact with PCP:  3.       PCP Contacted at Discharge: (Y/N)  4.       Summary of Handoff Given to PCP:  5.       Post-Discharge Appointment Date and Location: [facility] [[email or web address]
1.       PCP Contacted on Admission: (Y/N) --> Name & Phone #:  2.       Date of Contact with PCP:  3.       PCP Contacted at Discharge: (Y/N)  4.       Summary of Handoff Given to PCP:  5.       Post-Discharge Appointment Date and Location: [facility]
1.       PCP Contacted on Admission: (Y/N) --> Name & Phone #:  2.       Date of Contact with PCP:  3.       PCP Contacted at Discharge: (Y/N)  4.       Summary of Handoff Given to PCP:  5.       Post-Discharge Appointment Date and Location: [facility]

## 2020-02-18 NOTE — PROGRESS NOTE ADULT - PROBLEM SELECTOR PLAN 8
Patient AAOx0, unable to make decisions. Next of kin is son in [facility]. Brother would like to take brother to [place] to pursue rehab.   - s/p PEG  - Family would like patient in [facility], pending authorization

## 2020-02-18 NOTE — PROGRESS NOTE ADULT - ASSESSMENT
65 y/o M with PMHx of HTN and CVA (2 months ago) transferred from [facility] s/p stroke (left facial and eyelid drooling and speech slurring presenting for possible thrombectomy, as patient outside window for tPA - c/b reintubation following thrombectomy, now with metabolic encephalopathy 2/2 stroke, s/p PEG placement with bolus feeds. Pending placement to [facility].

## 2020-02-18 NOTE — PROGRESS NOTE ADULT - SUBJECTIVE AND OBJECTIVE BOX
**Incomplete Note**    OVERNIGHT EVENTS:    SUBJECTIVE / INTERVAL HPI: Patient seen and examined at bedside.     VITAL SIGNS:  Vital Signs Last 24 Hrs  T(C): 37 (18 Feb 2020 05:04), Max: 37.2 (17 Feb 2020 20:30)  T(F): 98.6 (18 Feb 2020 05:04), Max: 98.9 (17 Feb 2020 20:30)  HR: 90 (18 Feb 2020 05:04) (77 - 95)  BP: 175/88 (18 Feb 2020 05:04) (155/74 - 175/88)  BP(mean): --  RR: 18 (18 Feb 2020 05:04) (18 - 24)  SpO2: 94% (18 Feb 2020 05:04) (93% - 97%)    PHYSICAL EXAM:    General: WDWN  HEENT: NC/AT; PERRL, anicteric sclera; MMM  Neck: supple  Cardiovascular: +S1/S2; RRR  Respiratory: CTA B/L; no W/R/R  Gastrointestinal: soft, NT/ND; +BSx4  Extremities: WWP; no edema, clubbing or cyanosis  Vascular: 2+ radial, DP/PT pulses B/L  Neurological: AAOx3; no focal deficits    MEDICATIONS:  MEDICATIONS  (STANDING):  amLODIPine   Tablet 10 milliGRAM(s) Oral daily  aspirin  chewable 81 milliGRAM(s) Oral daily  atorvastatin 80 milliGRAM(s) Oral at bedtime  clopidogrel Tablet 75 milliGRAM(s) Oral daily  enoxaparin Injectable 40 milliGRAM(s) SubCutaneous every 24 hours  levETIRAcetam  Solution 500 milliGRAM(s) Enteral Tube every 12 hours  modafinil 100 milliGRAM(s) Oral <User Schedule>  nystatin Powder 1 Application(s) Topical two times a day    MEDICATIONS  (PRN):  acetaminophen    Suspension .. 650 milliGRAM(s) Oral every 8 hours PRN Temp greater or equal to 38C (100.4F)  melatonin 5 milliGRAM(s) Oral at bedtime PRN Insomnia      ALLERGIES:  Allergies    Allergy Status Unknown    Intolerances        LABS:              CAPILLARY BLOOD GLUCOSE          RADIOLOGY & ADDITIONAL TESTS: Reviewed.    ASSESSMENT:    PLAN:

## 2020-02-18 NOTE — CHART NOTE - NSCHARTNOTESELECT_GEN_ALL_CORE
Clinical Impact Nurse Practitioner Stroke Code Note
Coordination of care/Event Note
Event Note
Nutrition Follow Up/Nutrition Services
Nutrition Follow Up/Nutrition Services
Coordination of care/Event Note
Coordination of care/Event Note
Event Note
Nutrition Services
Nutrition Services

## 2020-02-18 NOTE — PROGRESS NOTE ADULT - PROBLEM SELECTOR PROBLEM 9
Nutrition, metabolism, and development symptoms
Nutrition, metabolism, and development symptoms
Transition of care performed with sharing of clinical summary
Nutrition, metabolism, and development symptoms
Transition of care performed with sharing of clinical summary
Nutrition, metabolism, and development symptoms
Transition of care performed with sharing of clinical summary
Nutrition, metabolism, and development symptoms
Nutrition, metabolism, and development symptoms

## 2020-02-18 NOTE — CHART NOTE - NSCHARTNOTEFT_GEN_A_CORE
Admitting Diagnosis:   Patient is a 64y old  Male who presents with a chief complaint of transfer from [facility] for stroke (17 Feb 2020 11:13)      PAST MEDICAL & SURGICAL HISTORY:  Hypertension  CVA (cerebral vascular accident)  No significant past surgical history      Current Nutrition Order:   Diet, NPO with Tube Feed:   Tube Feeding Modality: Gastrostomy  Jevity 1.2 Chuy (JEVITY1.2RTH)  Total Volume for 24 Hours (mL): 1660  Total Number of Cans: 7  Bolus  Total Volume of Bolus (mL):  415  Tube Feed Frequency: Every 6 hours   Tube Feed Start Time: 00:00  Bolus Feed Rate (mL per Hour): 415   Bolus Feed Duration (in Hours): 1  Bolus Feed Instructions:  4 feeds/day, total 7 cans/day (2 cans at breakfast, 2 cans at lunch, 1 can as snack, 2 cans at dinner) (02-13-20 @ 13:34)      PO Intake: Good (%) [   ]  Fair (50-75%) [   ] Poor (<25%) [   ]-N/A; Pt is NPO with TF     GI Issues: No N/V/D/C noted per RN.  No TF residuals noted per flowsheet. +fecal incontinence noted 2/17    Pain: Unable to assess pain at this time.     Skin Integrity: Edema 2+ left hand noted, Left shin abrasion noted.     Labs:         CAPILLARY BLOOD GLUCOSE          Medications:  MEDICATIONS  (STANDING):  amLODIPine   Tablet 10 milliGRAM(s) Oral daily  aspirin  chewable 81 milliGRAM(s) Oral daily  atorvastatin 80 milliGRAM(s) Oral at bedtime  carvedilol 6.25 milliGRAM(s) Oral every 12 hours  clopidogrel Tablet 75 milliGRAM(s) Oral daily  enoxaparin Injectable 40 milliGRAM(s) SubCutaneous every 24 hours  levETIRAcetam  Solution 500 milliGRAM(s) Enteral Tube every 12 hours  modafinil 100 milliGRAM(s) Oral <User Schedule>  nystatin Powder 1 Application(s) Topical two times a day    MEDICATIONS  (PRN):  acetaminophen    Suspension .. 650 milliGRAM(s) Oral every 8 hours PRN Temp greater or equal to 38C (100.4F)  melatonin 5 milliGRAM(s) Oral at bedtime PRN Insomnia      Weight:  (1/31) 82.3kg    Weight Change: No new wts to assess at this time.     Nutrition Focused Physical Exam: Completed [   ]  Not Pertinent [ X ]    Estimated energy needs:   Ht: 5'8" (estimated), Wt: 82.3kg, IBW: 70kg, 117.5%IBW.   *Pt w/ updated height documented as 5'4" (as of 2/4/20). ?Accuracy. Will continue to use ABW (82.3kg) for calculations as pt w/ unclear ht.    Needs calculated based on [facility] standards of care. Needs adjusted for age  20-25kcal/kg= 1646-2057kcal  1.0-1.2gms pro/kg= 82-98gms protein    Subjective:   Pt seen for nutrition follow up. 65 y/o M with PMHx of HTN and CVA (2 months ago) transferred from [facility] s/p stroke (left facial and eyelid drooling and speech slurring presenting for possible thrombectomy, as patient outside window for tPA - c/b reintubation following thrombectomy, now with metabolic encephalopathy 2/2 stroke, S/P PEG 2/11. Currently pending placement to [facility].     Pt visited, AAOx0, no visitors at bedside. Discussed pt w/ RN. Pt was previously on Jevity 1.2 at 65ml/hr x 24hrs via NG tube, tolerated well, feeds then held and PEG placed. Changed to bolus feeds 2/13 as RN expressed concerns for aspiration PNA as pt continued to slink/slide down in bed (RN previously reported pt w/ hx of aspiration PNA). However, bolus feeds likely to place pt at higher risk for aspiration PNA given higher volume (Continuous feeds recommended over bolus feeds for pts at high risk for aspiration as it evenly distributes feeds over a period of time). Per RN, pt tolerating bolus feeds with no s/sx of GI intolerance with plans for discharge later today. Please see below for full nutrition recs. Will continue to f/u per RD protocol.     Previous Nutrition Diagnosis:  Inadequate Energy Intake RT inability to meet needs w/ NPO status AEB consuming 0% EER.    Active [   ]  Resolved [ X  ]-s/p PEG placement, pt receiving bolus feeds.     If resolved, new PES:  Swallowing difficulty RT motor causes d/t recent stroke AEB dysphagia, EN dependent.     Goal: Pt will continue to meet >75% of EER via tolerated route     Recommendations:  1) If pt to switch to continuous feeds, recommend: Jevity 1.2 via PEG @ goal 65 ml/hr x 24hrs (providing 1560ml TV, 1872kcal, 86gms protein, 1258ml water (23kcal/kg and 1.0gms pro/kg based on ABW of 82.3kg).   >> Monitor s/s intolerance, maintain aspiration precautions at all times.   >> Additional water per MD discretion.  2) Team requesting recs for bolus feeds, however bolus feeds likely to put pt at higher risk for aspiration PNA given higher volume (if bolus is to be continued, strict aspiration precautions needs to be maintained):  If pt to continue with bolus feeds, recommend continue with: 7 cans Jevity 1.2 via PEG   >> Can have 2 cans for breakfast, 2 cans for lunch, 1 can for snack, 2 cans for dinner. Total provides: 1659 ml TV, 1991 kcal, 92 g protein, 1337ml free H2O; This meets: 24kcal/kg and 1.1 g protein/kg per ABW (82.3 kg)  3) Monitor lytes and replete prn.   4) Bowel regimen per MD discretion.     Education:   N/A; pt lethargic, no visitors present at bedside.     Risk Level: High [ X ] Moderate [   ] Low [   ] Admitting Diagnosis:   Patient is a 64y old  Male who presents with a chief complaint of transfer from [facility] for stroke (17 Feb 2020 11:13)      PAST MEDICAL & SURGICAL HISTORY:  Hypertension  CVA (cerebral vascular accident)  No significant past surgical history      Current Nutrition Order:   Diet, NPO with Tube Feed:   Tube Feeding Modality: Gastrostomy  Jevity 1.2 Chuy (JEVITY1.2RTH)  Total Volume for 24 Hours (mL): 1660  Total Number of Cans: 7  Bolus  Total Volume of Bolus (mL):  415  Tube Feed Frequency: Every 6 hours   Tube Feed Start Time: 00:00  Bolus Feed Rate (mL per Hour): 415   Bolus Feed Duration (in Hours): 1  Bolus Feed Instructions:  4 feeds/day, total 7 cans/day (2 cans at breakfast, 2 cans at lunch, 1 can as snack, 2 cans at dinner) (02-13-20 @ 13:34)      PO Intake: Good (%) [   ]  Fair (50-75%) [   ] Poor (<25%) [   ]-N/A; Pt is NPO with TF     GI Issues: No N/V/D/C noted per RN.  No TF residuals noted per flowsheet. +fecal incontinence noted 2/17    Pain: Unable to assess pain at this time.     Skin Integrity: Edema 2+ left hand noted, Left shin abrasion noted.     Labs:         CAPILLARY BLOOD GLUCOSE          Medications:  MEDICATIONS  (STANDING):  amLODIPine   Tablet 10 milliGRAM(s) Oral daily  aspirin  chewable 81 milliGRAM(s) Oral daily  atorvastatin 80 milliGRAM(s) Oral at bedtime  carvedilol 6.25 milliGRAM(s) Oral every 12 hours  clopidogrel Tablet 75 milliGRAM(s) Oral daily  enoxaparin Injectable 40 milliGRAM(s) SubCutaneous every 24 hours  levETIRAcetam  Solution 500 milliGRAM(s) Enteral Tube every 12 hours  modafinil 100 milliGRAM(s) Oral <User Schedule>  nystatin Powder 1 Application(s) Topical two times a day    MEDICATIONS  (PRN):  acetaminophen    Suspension .. 650 milliGRAM(s) Oral every 8 hours PRN Temp greater or equal to 38C (100.4F)  melatonin 5 milliGRAM(s) Oral at bedtime PRN Insomnia      Weight:  (1/31) 82.3kg    Weight Change: No new wts to assess at this time.     Nutrition Focused Physical Exam: Completed [   ]  Not Pertinent [ X ]    Estimated energy needs:   Ht: 5'8" (estimated), Wt: 82.3kg, IBW: 70kg, 117.5%IBW.   *Pt w/ updated height documented as 5'4" (as of 2/4/20). ?Accuracy. Will continue to use ABW (82.3kg) for calculations as pt w/ unclear ht.    Needs calculated based on [facility] standards of care. Needs adjusted for age  20-25kcal/kg= 1646-2057kcal  1.0-1.2gms pro/kg= 82-98gms protein    Subjective:   Pt seen for nutrition follow up. 63 y/o M with PMHx of HTN and CVA (2 months ago) transferred from [facility] s/p stroke (left facial and eyelid drooling and speech slurring presenting for possible thrombectomy, as patient outside window for tPA - c/b reintubation following thrombectomy, now with metabolic encephalopathy 2/2 stroke, S/P PEG 2/11. Currently pending placement to [facility].     Pt visited, AAOx0, no visitors at bedside. Discussed pt w/ RN. Pt was previously on Jevity 1.2 at 65ml/hr x 24hrs via NG tube, tolerated well, feeds then held and PEG placed. Changed to bolus feeds 2/13 as RN expressed concerns for aspiration PNA as pt continued to slink/slide down in bed (RN previously reported pt w/ hx of aspiration PNA). However, bolus feeds likely to place pt at higher risk for aspiration PNA given higher volume (Continuous feeds recommended over bolus feeds for pts at high risk for aspiration as it evenly distributes feeds over a period of time). Per RN, pt tolerating bolus feeds with no s/sx of GI intolerance with plans for discharge later today. Please see below for full nutrition recs. Will continue to f/u per RD protocol.     Previous Nutrition Diagnosis:  Inadequate Energy Intake RT inability to meet needs w/ NPO status AEB consuming 0% EER.    Active [   ]  Resolved [ X  ]-s/p PEG placement, pt receiving bolus feeds at goal, receiving >75% EER.     If resolved, new PES:  Swallowing difficulty RT motor causes d/t recent stroke AEB dysphagia, EN dependent.     Goal: Pt will continue to meet >75% of EER via tolerated route     Recommendations:  1) If pt to switch to continuous feeds, recommend: Jevity 1.2 via PEG @ goal 65 ml/hr x 24hrs (providing 1560ml TV, 1872kcal, 86gms protein, 1258ml water (23kcal/kg and 1.0gms pro/kg based on ABW of 82.3kg).   >> Monitor s/s intolerance, maintain aspiration precautions at all times.   >> Additional water per MD discretion.  2) Team requesting recs for bolus feeds, however bolus feeds likely to put pt at higher risk for aspiration PNA given higher volume (if bolus is to be continued, strict aspiration precautions needs to be maintained):  If pt to continue with bolus feeds, recommend continue with: 7 cans Jevity 1.2 via PEG   >> Can have 2 cans for breakfast, 2 cans for lunch, 1 can for snack, 2 cans for dinner. Total provides: 1659 ml TV, 1991 kcal, 92 g protein, 1337ml free H2O; This meets: 24kcal/kg and 1.1 g protein/kg per ABW (82.3 kg)  3) Monitor lytes and replete prn.   4) Bowel regimen per MD discretion.     Education:   N/A; pt lethargic, no visitors present at bedside.     Risk Level: High [ X ] Moderate [   ] Low [   ]

## 2020-02-21 DIAGNOSIS — F41.9 ANXIETY DISORDER, UNSPECIFIED: ICD-10-CM

## 2020-02-21 DIAGNOSIS — N39.490 OVERFLOW INCONTINENCE: ICD-10-CM

## 2020-02-21 DIAGNOSIS — Z51.5 ENCOUNTER FOR PALLIATIVE CARE: ICD-10-CM

## 2020-02-21 DIAGNOSIS — I67.2 CEREBRAL ATHEROSCLEROSIS: ICD-10-CM

## 2020-02-21 DIAGNOSIS — R06.03 ACUTE RESPIRATORY DISTRESS: ICD-10-CM

## 2020-02-21 DIAGNOSIS — R62.7 ADULT FAILURE TO THRIVE: ICD-10-CM

## 2020-02-21 DIAGNOSIS — R47.1 DYSARTHRIA AND ANARTHRIA: ICD-10-CM

## 2020-02-21 DIAGNOSIS — I63.9 CEREBRAL INFARCTION, UNSPECIFIED: ICD-10-CM

## 2020-02-21 DIAGNOSIS — R45.1 RESTLESSNESS AND AGITATION: ICD-10-CM

## 2020-02-21 DIAGNOSIS — R29.810 FACIAL WEAKNESS: ICD-10-CM

## 2020-02-21 DIAGNOSIS — J69.0 PNEUMONITIS DUE TO INHALATION OF FOOD AND VOMIT: ICD-10-CM

## 2020-02-21 DIAGNOSIS — I95.9 HYPOTENSION, UNSPECIFIED: ICD-10-CM

## 2020-02-21 DIAGNOSIS — G93.41 METABOLIC ENCEPHALOPATHY: ICD-10-CM

## 2020-02-21 DIAGNOSIS — R47.01 APHASIA: ICD-10-CM

## 2020-02-21 DIAGNOSIS — E87.0 HYPEROSMOLALITY AND HYPERNATREMIA: ICD-10-CM

## 2020-02-21 DIAGNOSIS — K44.9 DIAPHRAGMATIC HERNIA WITHOUT OBSTRUCTION OR GANGRENE: ICD-10-CM

## 2020-02-21 DIAGNOSIS — Z86.73 PERSONAL HISTORY OF TRANSIENT ISCHEMIC ATTACK (TIA), AND CEREBRAL INFARCTION WITHOUT RESIDUAL DEFICITS: ICD-10-CM

## 2020-02-21 DIAGNOSIS — F03.90 UNSPECIFIED DEMENTIA WITHOUT BEHAVIORAL DISTURBANCE: ICD-10-CM

## 2020-02-21 DIAGNOSIS — R13.10 DYSPHAGIA, UNSPECIFIED: ICD-10-CM

## 2020-02-21 DIAGNOSIS — I10 ESSENTIAL (PRIMARY) HYPERTENSION: ICD-10-CM

## 2020-02-21 DIAGNOSIS — G81.94 HEMIPLEGIA, UNSPECIFIED AFFECTING LEFT NONDOMINANT SIDE: ICD-10-CM

## 2020-02-21 DIAGNOSIS — I66.09 OCCLUSION AND STENOSIS OF UNSPECIFIED MIDDLE CEREBRAL ARTERY: ICD-10-CM

## 2020-02-21 DIAGNOSIS — R29.705 NIHSS SCORE 5: ICD-10-CM

## 2020-02-21 DIAGNOSIS — G93.89 OTHER SPECIFIED DISORDERS OF BRAIN: ICD-10-CM

## 2020-02-21 DIAGNOSIS — E66.9 OBESITY, UNSPECIFIED: ICD-10-CM

## 2020-02-21 DIAGNOSIS — G40.901 EPILEPSY, UNSPECIFIED, NOT INTRACTABLE, WITH STATUS EPILEPTICUS: ICD-10-CM

## 2020-02-21 DIAGNOSIS — R50.9 FEVER, UNSPECIFIED: ICD-10-CM

## 2020-02-21 DIAGNOSIS — N17.9 ACUTE KIDNEY FAILURE, UNSPECIFIED: ICD-10-CM

## 2020-02-26 ENCOUNTER — APPOINTMENT (OUTPATIENT)
Dept: NEUROLOGY | Facility: CLINIC | Age: 65
End: 2020-02-26

## 2020-03-02 ENCOUNTER — APPOINTMENT (OUTPATIENT)
Dept: NEUROLOGY | Facility: CLINIC | Age: 65
End: 2020-03-02

## 2020-08-12 NOTE — PROGRESS NOTE ADULT - PROBLEM SELECTOR PLAN 6
#Overflow incontinence:   Patient with increase urinary output, concern for overflow incontinence.  - c/w Doxazosin 1mg 2mg qd at bedtime   - goldberg discontinued today   - failed TOV, so continue with bladder scans and straight cath q6 Transaminitis

## 2021-03-25 NOTE — PROGRESS NOTE ADULT - PROBLEM SELECTOR PLAN 6
Call to schedule DEXA at [phone]  Check PTH, Ca, albumin now   Follow up in 1 year, sooner if labs/Dexa abnormal   now improving, likely prerenal due to decreased PO intake  - free water flushes  - if Cr uptrending, would check renal US.  recommend UA today as spiked fever.  - Trend BMP   - Avoid nephrotoxic medications

## 2021-04-03 NOTE — DIETITIAN INITIAL EVALUATION ADULT. - DIET TYPE
40.8 If pt is successfully extubated, consider SLP eval to determine safest texture diet and advance to regular diet. Monitor PO, monitor need for addition of ONS.

## 2022-01-12 NOTE — PATIENT PROFILE ADULT - NSPROSPHOSPCHAPLAINYN_GEN_A_NUR
Daily alcohol intake as per previous h&p  no tobacco, no recreational drugs  no work, no money to pay for medications
no

## 2022-08-31 NOTE — PROGRESS NOTE ADULT - PROBLEM SELECTOR PROBLEM 8
8/31/2022       RE: Charles Nogueira  [address] [facility] [address]     Dear Colleague,    Thank you for referring your patient, Charles Nogueira, to the [facility] [place] at [facility]. Please see a copy of my visit note below.    Subjective      CHIEF COMPLAINT/REASON FOR VISIT   Patient of Dr. Foreman's seen on my calendar  Elevated PSA  Microscopic blood     HISTORY OF PRESENT ILLNESS   Mr. Nogueira is a very pleasant 82-year-old gentleman, who presents today for follow-up regarding previous elevated PSA.  Patient was last seen by Dr. Foreman on 09/29/2021.  PSA was 5.74 at that time.      Patient had his PSA rechecked several weeks ago.  This was 0.05.  Patient denies any new medications other than warfarin.  He only takes zinc as far as supplements.  He has been on this for years.    There was also previous concern for blood noted on dipstick.  Last year urine microscopy showed 2 RBCs per high-power field.  Patient is concerned if this is still present.  Urinalysis today does show moderate amount of blood.  Patient denies any gross hematuria.    Patient denies minimal urinary symptomatology.  He notes nocturia x1.  He denies dysuria, gross hematuria, urgency, frequency, urinary incontinence.      Patient does note decreased appetite.  He continues to remain [place] [facility] [employer] [facility].  He regularly goes there.    Patient denies any unexplained weight loss, bone pain, nausea, vomiting, fevers, chills, shortness of breath, or chest pain.    The following portions of the patient's history were reviewed and updated as appropriate: allergies, current medications, past family history, past medical history, past social history, past surgical history, and problem list.     REVIEW OF SYSTEMS   Review of Systems   Constitutional: Positive for appetite change. Negative for chills, fever and unexpected weight  change.   Respiratory: Negative for shortness of breath.    Cardiovascular: Negative for chest pain.   Gastrointestinal: Negative for nausea and vomiting.   Genitourinary: Negative for dysuria, frequency, hematuria and urgency.      Per HPI.     Patient Active Problem List   Diagnosis     Impotence of organic origin     Actinic keratosis     Viral warts     Malignant melanoma of skin of trunk, except scrotum (H)     CARDIOVASCULAR SCREENING; LDL GOAL LESS THAN 130     Pacemaker     PVC (premature ventricular contraction)     Heart murmur     Advanced directives, counseling/discussion     SCC (squamous cell carcinoma), face     Syncope     Mitral valve disease     Migraine equivalent     Sick sinus syndrome (H)     Essential hypertension, benign     Essential hypertension     Cardiac pacemaker in situ     Nonrheumatic mitral valve regurgitation     Acute ischemic heart disease (H)     Paroxysmal atrial fibrillation (H)     Paroxysmal ventricular tachycardia (H)     Chronic diastolic congestive heart failure (H)     Fatigue     Cervicalgia     Episode of transient neurologic symptoms     Chills     Presbycusis of both ears     Weakness of both upper extremities      Past Medical History:   Diagnosis Date     Acute ischemic heart disease (H) 8/24/2018     Allergic rhinitis, cause unspecified      Cardiac pacemaker in situ 8/14/2017     Cervicalgia 4/26/2022     Chills 4/26/2022     Chronic diastolic congestive heart failure (H) 7/26/2021     Dysphagia, oropharyngeal phase 06/14/2018    Mild per video swallow study. To use swallowing techniques     Episode of transient neurologic symptoms 4/26/2022     Essential hypertension, benign 11/11/2016     Fatigue 4/26/2022     Heart murmur 5/10/2011     Melanoma (H)      Migraine equivalent 11/10/2015     Mitral valve prolapse      Non-rheumatic mitral regurgitation 8/9/2018     Other forms of migraine, without mention of intractable migraine without mention of status  "migrainosus      Paroxysmal A-fib (H)      Premature ventricular contraction      Presbycusis of both ears 4/26/2022     PVC (premature ventricular contraction) 5/10/2011     Sick sinus syndrome (H) 05/03/2016    s/p PPM implant 7/2/14     Squamous cell carcinoma      Syncope      Weakness of both upper extremities 4/26/2022        Objective      PHYSICAL EXAM   /74   Ht 1.753 m (5' 9\")   Wt 69.9 kg (154 lb)   BMI 22.74 kg/m     Physical Exam  Constitutional:       Appearance: Normal appearance.   HENT:      Head: Normocephalic.      Nose: Nose normal.   Eyes:      General: No scleral icterus.  Pulmonary:      Effort: Pulmonary effort is normal.   Abdominal:      General: There is no distension.   Neurological:      General: No focal deficit present.      Mental Status: He is alert and oriented to person, place, and time.   Psychiatric:         Mood and Affect: Mood normal.         Behavior: Behavior normal.         LABORATORY     Lab Results   Component Value Date    PSA 0.05 08/02/2022    PSA 5.74 (H) 09/17/2021    PSA 5.46 (H) 07/26/2021    PSA 5.71 (H) 06/16/2021      Recent Labs   Lab Test 08/31/22  1350 09/29/21  0800   COLOR Yellow Yellow   APPEARANCE Clear Clear   URINEGLC Negative Negative   URINEBILI Negative Negative   URINEKETONE Negative Negative   SG 1.015 1.015   UBLD Moderate* Moderate*   URINEPH 5.5 5.5   PROTEIN Trace* Negative   UROBILINOGEN 0.2 0.2   NITRITE Negative Negative   LEUKEST Negative Negative   RBCU  --  2   WBCU  --  <1     Assessment & Plan    1. Elevated prostate specific antigen (PSA)    2. Microscopic hematuria      I had the pleasure today of meeting with Mr. Nogueira to discuss his elevated PSA.  PSA last year was 5.74.  We discussed changing this year to 0.05 is unexpected and unusual.  I do have a concern about possible misidentification or interfering substance with the assay.  Reviewing this, I does appear that there is a change in the reported units, but it appear " they are equivalent.    -Would recommend repeat PSA at first availability.  Depending upon the results, may need further evaluation.  Previously he had discussed this with Dr. Foreman about possible prostate biopsy versus stopping any further evaluation.    We are try to make sure there is not a precipitous change in PSA.    Urinalysis today continues to show presence of blood.      -We will send urine for urine microscopy to confirm presence of microscopic blood.  If greater than or equal to 3 RBCs per high-power field would recommend CT urogram and cystoscopy.    Signed by:     Nani Fuentes PA-C 8/31/2022 1:50 PM    Nutrition, metabolism, and development symptoms

## 2023-03-11 NOTE — PROGRESS NOTE ADULT - PROBLEM SELECTOR PROBLEM 5
Pt discharged to home. Discharge paperwork provided. Education provided by ERP.  Pt was given follow up instructions   Pt verbalized understanding of all instructions for discharge. Patient ambulatory, alert and oriented x 4, out of ER with all belongings and steady gait.    PAULY (acute kidney injury)

## 2024-04-29 RX ORDER — ASPIRIN/CALCIUM CARB/MAGNESIUM 324 MG
1 TABLET ORAL
Qty: 0 | Refills: 0 | DISCHARGE

## 2024-04-29 RX ORDER — AMLODIPINE BESYLATE 2.5 MG/1
1 TABLET ORAL
Qty: 0 | Refills: 0 | DISCHARGE

## 2024-04-29 RX ORDER — TRAZODONE HCL 50 MG
1 TABLET ORAL
Qty: 0 | Refills: 0 | DISCHARGE

## 2024-04-29 RX ORDER — SERTRALINE 25 MG/1
1 TABLET, FILM COATED ORAL
Qty: 0 | Refills: 0 | DISCHARGE

## 2024-04-29 RX ORDER — ATORVASTATIN CALCIUM 80 MG/1
1 TABLET, FILM COATED ORAL
Qty: 0 | Refills: 0 | DISCHARGE

## 2025-04-09 NOTE — PROGRESS NOTE ADULT - SUBJECTIVE AND OBJECTIVE BOX
**Incomplete Note** Pt v/u. \"This might explain my dizziness in the mornings\".     States she cannot take OTC iron supplement, makes her sick to her stomach, n/v and heartburn, even if taking with food. States she has tried many different forms of oral iron over the years, and does not tolerate.     Ofelia blood or black stools.     Please advise.      **Incomplete Note**    OVERNIGHT EVENTS:    SUBJECTIVE / INTERVAL HPI: Patient seen and examined at bedside.     VITAL SIGNS:  Vital Signs Last 24 Hrs  T(C): 36.9 (16 Feb 2020 17:13), Max: 36.9 (16 Feb 2020 17:13)  T(F): 98.4 (16 Feb 2020 17:13), Max: 98.4 (16 Feb 2020 17:13)  HR: 88 (16 Feb 2020 17:13) (85 - 90)  BP: 148/79 (16 Feb 2020 17:13) (122/82 - 148/79)  BP(mean): --  RR: 20 (16 Feb 2020 17:13) (18 - 20)  SpO2: 96% (16 Feb 2020 17:13) (94% - 97%)    PHYSICAL EXAM:    General: WDWN  HEENT: NC/AT; PERRL, anicteric sclera; MMM  Neck: supple  Cardiovascular: +S1/S2; RRR  Respiratory: CTA B/L; no W/R/R  Gastrointestinal: soft, NT/ND; +BSx4  Extremities: WWP; no edema, clubbing or cyanosis  Vascular: 2+ radial, DP/PT pulses B/L  Neurological: AAOx3; no focal deficits    MEDICATIONS:  MEDICATIONS  (STANDING):  amLODIPine   Tablet 5 milliGRAM(s) Oral daily  aspirin  chewable 81 milliGRAM(s) Oral daily  atorvastatin 80 milliGRAM(s) Oral at bedtime  clopidogrel Tablet 75 milliGRAM(s) Oral daily  enoxaparin Injectable 40 milliGRAM(s) SubCutaneous every 24 hours  levETIRAcetam  Solution 500 milliGRAM(s) Enteral Tube every 12 hours  modafinil 100 milliGRAM(s) Oral <User Schedule>  nystatin Powder 1 Application(s) Topical two times a day    MEDICATIONS  (PRN):  acetaminophen    Suspension .. 650 milliGRAM(s) Oral every 8 hours PRN Temp greater or equal to 38C (100.4F)  melatonin 5 milliGRAM(s) Oral at bedtime PRN Insomnia      ALLERGIES:  Allergies    Allergy Status Unknown    Intolerances        LABS:              CAPILLARY BLOOD GLUCOSE          RADIOLOGY & ADDITIONAL TESTS: Reviewed.    ASSESSMENT:    PLAN: OVERNIGHT EVENTS: No acute events overnight. Seroquel 12.5mg x1 for agitation, right mitten placed as patient agitated. Afebrile. VSS. No labs this AM.    SUBJECTIVE / INTERVAL HPI: Patient seen and examined at bedside, unable to obtain ROS.     VITAL SIGNS:  Vital Signs Last 24 Hrs  T(C): 36.9 (16 Feb 2020 17:13), Max: 36.9 (16 Feb 2020 17:13)  T(F): 98.4 (16 Feb 2020 17:13), Max: 98.4 (16 Feb 2020 17:13)  HR: 88 (16 Feb 2020 17:13) (85 - 90)  BP: 148/79 (16 Feb 2020 17:13) (122/82 - 148/79)  BP(mean): --  RR: 20 (16 Feb 2020 17:13) (18 - 20)  SpO2: 96% (16 Feb 2020 17:13) (94% - 97%)    PHYSICAL EXAM:    General: WDWN  HEENT: NC/AT; PERRL, anicteric sclera; MMM  Neck: supple  Cardiovascular: +S1/S2; RRR  Respiratory: CTA B/L; no W/R/R  Gastrointestinal: soft, NT/ND; +BSx4  Extremities: WWP; no edema, clubbing or cyanosis  Vascular: 2+ radial, DP/PT pulses B/L  Neurological: AAOx3; no focal deficits    MEDICATIONS:  MEDICATIONS  (STANDING):  amLODIPine   Tablet 5 milliGRAM(s) Oral daily  aspirin  chewable 81 milliGRAM(s) Oral daily  atorvastatin 80 milliGRAM(s) Oral at bedtime  clopidogrel Tablet 75 milliGRAM(s) Oral daily  enoxaparin Injectable 40 milliGRAM(s) SubCutaneous every 24 hours  levETIRAcetam  Solution 500 milliGRAM(s) Enteral Tube every 12 hours  modafinil 100 milliGRAM(s) Oral <User Schedule>  nystatin Powder 1 Application(s) Topical two times a day    MEDICATIONS  (PRN):  acetaminophen    Suspension .. 650 milliGRAM(s) Oral every 8 hours PRN Temp greater or equal to 38C (100.4F)  melatonin 5 milliGRAM(s) Oral at bedtime PRN Insomnia      ALLERGIES:  Allergies    Allergy Status Unknown    Intolerances        LABS:              CAPILLARY BLOOD GLUCOSE          RADIOLOGY & ADDITIONAL TESTS: Reviewed.    ASSESSMENT:    PLAN: OVERNIGHT EVENTS: No acute events overnight. Seroquel 12.5mg x1 for agitation, right mitten placed as patient agitated. Afebrile. VSS. No labs this AM.    SUBJECTIVE / INTERVAL HPI: Patient seen and examined at bedside, unable to obtain ROS.     VITAL SIGNS:  Vital Signs Last 24 Hrs  T(C): 36.9 (16 Feb 2020 17:13), Max: 36.9 (16 Feb 2020 17:13)  T(F): 98.4 (16 Feb 2020 17:13), Max: 98.4 (16 Feb 2020 17:13)  HR: 88 (16 Feb 2020 17:13) (85 - 90)  BP: 148/79 (16 Feb 2020 17:13) (122/82 - 148/79)  BP(mean): --  RR: 20 (16 Feb 2020 17:13) (18 - 20)  SpO2: 96% (16 Feb 2020 17:13) (94% - 97%)    PHYSICAL EXAM:    General: middle aged male resting in bed; NAD  HEENT: NC/AT; PERRL, anicteric sclera, MMM  Neck: supple, no JVD  Cardiovascular: +S1/S2; RRR  Respiratory: CTA b/l  Gastrointestinal: soft, NT/ND; +BSx4; PEG tube in place, c/d/i  Extremities: WWP; no edema, clubbing or cyanosis  Vascular: 2+ radial, DP/PT pulses B/L  Neurological: AAOx 0, alert, but does not follow commands     MEDICATIONS:  MEDICATIONS  (STANDING):  amLODIPine   Tablet 5 milliGRAM(s) Oral daily  aspirin  chewable 81 milliGRAM(s) Oral daily  atorvastatin 80 milliGRAM(s) Oral at bedtime  clopidogrel Tablet 75 milliGRAM(s) Oral daily  enoxaparin Injectable 40 milliGRAM(s) SubCutaneous every 24 hours  levETIRAcetam  Solution 500 milliGRAM(s) Enteral Tube every 12 hours  modafinil 100 milliGRAM(s) Oral <User Schedule>  nystatin Powder 1 Application(s) Topical two times a day    MEDICATIONS  (PRN):  acetaminophen    Suspension .. 650 milliGRAM(s) Oral every 8 hours PRN Temp greater or equal to 38C (100.4F)  melatonin 5 milliGRAM(s) Oral at bedtime PRN Insomnia      ALLERGIES:  Allergies    Allergy Status Unknown    Intolerances        LABS:              CAPILLARY BLOOD GLUCOSE          RADIOLOGY & ADDITIONAL TESTS: Reviewed.    ASSESSMENT:    PLAN: